# Patient Record
Sex: MALE | Race: WHITE | Employment: OTHER | URBAN - METROPOLITAN AREA
[De-identification: names, ages, dates, MRNs, and addresses within clinical notes are randomized per-mention and may not be internally consistent; named-entity substitution may affect disease eponyms.]

---

## 2017-04-17 RX ORDER — PRAVASTATIN SODIUM 40 MG
TABLET ORAL
Qty: 90 TABLET | Refills: 0 | Status: SHIPPED | OUTPATIENT
Start: 2017-04-17 | End: 2017-08-21 | Stop reason: SDUPTHER

## 2017-06-26 ENCOUNTER — OFFICE VISIT (OUTPATIENT)
Dept: CARDIOLOGY CLINIC | Age: 72
End: 2017-06-26

## 2017-06-26 VITALS
HEART RATE: 98 BPM | WEIGHT: 177 LBS | DIASTOLIC BLOOD PRESSURE: 80 MMHG | SYSTOLIC BLOOD PRESSURE: 110 MMHG | BODY MASS INDEX: 28.45 KG/M2 | OXYGEN SATURATION: 97 % | HEIGHT: 66 IN

## 2017-06-26 DIAGNOSIS — I48.3 TYPICAL ATRIAL FLUTTER (HCC): Primary | ICD-10-CM

## 2017-06-26 PROCEDURE — 99214 OFFICE O/P EST MOD 30 MIN: CPT | Performed by: INTERNAL MEDICINE

## 2017-06-26 PROCEDURE — 1123F ACP DISCUSS/DSCN MKR DOCD: CPT | Performed by: INTERNAL MEDICINE

## 2017-06-26 PROCEDURE — G8419 CALC BMI OUT NRM PARAM NOF/U: HCPCS | Performed by: INTERNAL MEDICINE

## 2017-06-26 PROCEDURE — 4040F PNEUMOC VAC/ADMIN/RCVD: CPT | Performed by: INTERNAL MEDICINE

## 2017-06-26 PROCEDURE — 3017F COLORECTAL CA SCREEN DOC REV: CPT | Performed by: INTERNAL MEDICINE

## 2017-06-26 PROCEDURE — G8427 DOCREV CUR MEDS BY ELIG CLIN: HCPCS | Performed by: INTERNAL MEDICINE

## 2017-06-26 PROCEDURE — 1036F TOBACCO NON-USER: CPT | Performed by: INTERNAL MEDICINE

## 2017-06-26 RX ORDER — CLOTRIMAZOLE 1 %
CREAM (GRAM) TOPICAL 2 TIMES DAILY
COMMUNITY
End: 2018-01-02 | Stop reason: ALTCHOICE

## 2017-06-26 ASSESSMENT — ENCOUNTER SYMPTOMS
ABDOMINAL DISTENTION: 0
COUGH: 0
WHEEZING: 0
EYE REDNESS: 0
BLOOD IN STOOL: 0
SHORTNESS OF BREATH: 0

## 2017-06-29 ENCOUNTER — OFFICE VISIT (OUTPATIENT)
Dept: FAMILY MEDICINE CLINIC | Age: 72
End: 2017-06-29

## 2017-06-29 VITALS
HEART RATE: 72 BPM | TEMPERATURE: 97.7 F | SYSTOLIC BLOOD PRESSURE: 122 MMHG | DIASTOLIC BLOOD PRESSURE: 78 MMHG | WEIGHT: 173 LBS | HEIGHT: 66 IN | BODY MASS INDEX: 27.8 KG/M2

## 2017-06-29 DIAGNOSIS — E78.2 MIXED HYPERLIPIDEMIA: Primary | ICD-10-CM

## 2017-06-29 DIAGNOSIS — M25.462 EFFUSION OF BURSA OF KNEE, LEFT: ICD-10-CM

## 2017-06-29 DIAGNOSIS — Z11.59 NEED FOR HEPATITIS C SCREENING TEST: ICD-10-CM

## 2017-06-29 DIAGNOSIS — E78.2 MIXED HYPERLIPIDEMIA: ICD-10-CM

## 2017-06-29 LAB
A/G RATIO: 2.2 (ref 1.1–2.2)
ALBUMIN SERPL-MCNC: 4.8 G/DL (ref 3.4–5)
ALP BLD-CCNC: 71 U/L (ref 40–129)
ALT SERPL-CCNC: 31 U/L (ref 10–40)
ANION GAP SERPL CALCULATED.3IONS-SCNC: 12 MMOL/L (ref 3–16)
AST SERPL-CCNC: 32 U/L (ref 15–37)
BILIRUB SERPL-MCNC: 0.7 MG/DL (ref 0–1)
BILIRUBIN URINE: NEGATIVE
BLOOD, URINE: NEGATIVE
BUN BLDV-MCNC: 17 MG/DL (ref 7–20)
CALCIUM SERPL-MCNC: 9.9 MG/DL (ref 8.3–10.6)
CHLORIDE BLD-SCNC: 107 MMOL/L (ref 99–110)
CHOLESTEROL, TOTAL: 191 MG/DL (ref 0–199)
CLARITY: CLEAR
CO2: 26 MMOL/L (ref 21–32)
COLOR: YELLOW
CREAT SERPL-MCNC: 1 MG/DL (ref 0.8–1.3)
EPITHELIAL CELLS, UA: 0 /HPF (ref 0–5)
GFR AFRICAN AMERICAN: >60
GFR NON-AFRICAN AMERICAN: >60
GLOBULIN: 2.2 G/DL
GLUCOSE BLD-MCNC: 93 MG/DL (ref 70–99)
GLUCOSE URINE: NEGATIVE MG/DL
HCT VFR BLD CALC: 49.1 % (ref 40.5–52.5)
HDLC SERPL-MCNC: 62 MG/DL (ref 40–60)
HEMOGLOBIN: 16.1 G/DL (ref 13.5–17.5)
HEPATITIS C ANTIBODY INTERPRETATION: NORMAL
HYALINE CASTS: 0 /LPF (ref 0–8)
KETONES, URINE: NEGATIVE MG/DL
LDL CHOLESTEROL CALCULATED: 116 MG/DL
LEUKOCYTE ESTERASE, URINE: NEGATIVE
MCH RBC QN AUTO: 31.3 PG (ref 26–34)
MCHC RBC AUTO-ENTMCNC: 32.8 G/DL (ref 31–36)
MCV RBC AUTO: 95.7 FL (ref 80–100)
MICROSCOPIC EXAMINATION: NORMAL
NITRITE, URINE: NEGATIVE
PDW BLD-RTO: 14 % (ref 12.4–15.4)
PH UA: 6.5
PLATELET # BLD: 213 K/UL (ref 135–450)
PMV BLD AUTO: 8.7 FL (ref 5–10.5)
POTASSIUM SERPL-SCNC: 5.3 MMOL/L (ref 3.5–5.1)
PROTEIN UA: NEGATIVE MG/DL
RBC # BLD: 5.13 M/UL (ref 4.2–5.9)
RBC UA: 2 /HPF (ref 0–4)
SODIUM BLD-SCNC: 145 MMOL/L (ref 136–145)
SPECIFIC GRAVITY UA: 1.02
TOTAL PROTEIN: 7 G/DL (ref 6.4–8.2)
TRIGL SERPL-MCNC: 67 MG/DL (ref 0–150)
UROBILINOGEN, URINE: 0.2 E.U./DL
VLDLC SERPL CALC-MCNC: 13 MG/DL
WBC # BLD: 5.8 K/UL (ref 4–11)
WBC UA: 1 /HPF (ref 0–5)

## 2017-06-29 PROCEDURE — G8419 CALC BMI OUT NRM PARAM NOF/U: HCPCS | Performed by: FAMILY MEDICINE

## 2017-06-29 PROCEDURE — 1036F TOBACCO NON-USER: CPT | Performed by: FAMILY MEDICINE

## 2017-06-29 PROCEDURE — 3017F COLORECTAL CA SCREEN DOC REV: CPT | Performed by: FAMILY MEDICINE

## 2017-06-29 PROCEDURE — 4040F PNEUMOC VAC/ADMIN/RCVD: CPT | Performed by: FAMILY MEDICINE

## 2017-06-29 PROCEDURE — 1123F ACP DISCUSS/DSCN MKR DOCD: CPT | Performed by: FAMILY MEDICINE

## 2017-06-29 PROCEDURE — G8427 DOCREV CUR MEDS BY ELIG CLIN: HCPCS | Performed by: FAMILY MEDICINE

## 2017-06-29 PROCEDURE — 99213 OFFICE O/P EST LOW 20 MIN: CPT | Performed by: FAMILY MEDICINE

## 2017-06-29 ASSESSMENT — ENCOUNTER SYMPTOMS
BLOOD IN STOOL: 0
SHORTNESS OF BREATH: 0
NAUSEA: 0
CHEST TIGHTNESS: 0
CONSTIPATION: 0
ABDOMINAL DISTENTION: 0
ABDOMINAL PAIN: 0
DIARRHEA: 0
VOMITING: 0

## 2017-06-29 ASSESSMENT — PATIENT HEALTH QUESTIONNAIRE - PHQ9
SUM OF ALL RESPONSES TO PHQ QUESTIONS 1-9: 0
2. FEELING DOWN, DEPRESSED OR HOPELESS: 0
SUM OF ALL RESPONSES TO PHQ9 QUESTIONS 1 & 2: 0
1. LITTLE INTEREST OR PLEASURE IN DOING THINGS: 0

## 2017-08-21 RX ORDER — PRAVASTATIN SODIUM 40 MG
TABLET ORAL
Qty: 90 TABLET | Refills: 1 | Status: SHIPPED | OUTPATIENT
Start: 2017-08-21 | End: 2018-03-09 | Stop reason: SDUPTHER

## 2017-12-04 RX ORDER — METOPROLOL SUCCINATE 25 MG/1
TABLET, EXTENDED RELEASE ORAL
Qty: 90 TABLET | Refills: 3 | Status: SHIPPED | OUTPATIENT
Start: 2017-12-04 | End: 2018-08-10 | Stop reason: SDUPTHER

## 2018-01-02 ENCOUNTER — OFFICE VISIT (OUTPATIENT)
Dept: FAMILY MEDICINE CLINIC | Age: 73
End: 2018-01-02

## 2018-01-02 VITALS
BODY MASS INDEX: 28.96 KG/M2 | SYSTOLIC BLOOD PRESSURE: 138 MMHG | TEMPERATURE: 97.3 F | HEIGHT: 66 IN | DIASTOLIC BLOOD PRESSURE: 82 MMHG | WEIGHT: 180.2 LBS

## 2018-01-02 DIAGNOSIS — I48.91 ATRIAL FIBRILLATION, UNSPECIFIED TYPE (HCC): ICD-10-CM

## 2018-01-02 DIAGNOSIS — Z12.5 SPECIAL SCREENING FOR MALIGNANT NEOPLASM OF PROSTATE: ICD-10-CM

## 2018-01-02 DIAGNOSIS — E78.2 MIXED HYPERLIPIDEMIA: ICD-10-CM

## 2018-01-02 DIAGNOSIS — E78.2 MIXED HYPERLIPIDEMIA: Primary | ICD-10-CM

## 2018-01-02 LAB
A/G RATIO: 2.5 (ref 1.1–2.2)
ALBUMIN SERPL-MCNC: 4.5 G/DL (ref 3.4–5)
ALP BLD-CCNC: 63 U/L (ref 40–129)
ALT SERPL-CCNC: 34 U/L (ref 10–40)
ANION GAP SERPL CALCULATED.3IONS-SCNC: 12 MMOL/L (ref 3–16)
AST SERPL-CCNC: 32 U/L (ref 15–37)
BILIRUB SERPL-MCNC: 0.8 MG/DL (ref 0–1)
BILIRUBIN URINE: NEGATIVE
BLOOD, URINE: NEGATIVE
BUN BLDV-MCNC: 15 MG/DL (ref 7–20)
CALCIUM SERPL-MCNC: 9.1 MG/DL (ref 8.3–10.6)
CHLORIDE BLD-SCNC: 106 MMOL/L (ref 99–110)
CHOLESTEROL, TOTAL: 170 MG/DL (ref 0–199)
CLARITY: CLEAR
CO2: 26 MMOL/L (ref 21–32)
COLOR: YELLOW
CREAT SERPL-MCNC: 0.9 MG/DL (ref 0.8–1.3)
EPITHELIAL CELLS, UA: 0 /HPF (ref 0–5)
GFR AFRICAN AMERICAN: >60
GFR NON-AFRICAN AMERICAN: >60
GLOBULIN: 1.8 G/DL
GLUCOSE BLD-MCNC: 88 MG/DL (ref 70–99)
GLUCOSE URINE: NEGATIVE MG/DL
HDLC SERPL-MCNC: 61 MG/DL (ref 40–60)
HYALINE CASTS: 1 /LPF (ref 0–8)
KETONES, URINE: NEGATIVE MG/DL
LDL CHOLESTEROL CALCULATED: 95 MG/DL
LEUKOCYTE ESTERASE, URINE: NEGATIVE
MICROSCOPIC EXAMINATION: NORMAL
NITRITE, URINE: NEGATIVE
PH UA: 6
POTASSIUM SERPL-SCNC: 4.4 MMOL/L (ref 3.5–5.1)
PROSTATE SPECIFIC ANTIGEN: 1.78 NG/ML (ref 0–4)
PROTEIN UA: NEGATIVE MG/DL
RBC UA: 3 /HPF (ref 0–4)
SODIUM BLD-SCNC: 144 MMOL/L (ref 136–145)
SPECIFIC GRAVITY UA: 1.02
TOTAL PROTEIN: 6.3 G/DL (ref 6.4–8.2)
TRIGL SERPL-MCNC: 68 MG/DL (ref 0–150)
UROBILINOGEN, URINE: 0.2 E.U./DL
VLDLC SERPL CALC-MCNC: 14 MG/DL
WBC UA: 2 /HPF (ref 0–5)

## 2018-01-02 PROCEDURE — 99214 OFFICE O/P EST MOD 30 MIN: CPT | Performed by: FAMILY MEDICINE

## 2018-01-02 ASSESSMENT — ENCOUNTER SYMPTOMS
ABDOMINAL DISTENTION: 0
CONSTIPATION: 0
ABDOMINAL PAIN: 0
SHORTNESS OF BREATH: 0
BLOOD IN STOOL: 0
DIARRHEA: 0
CHEST TIGHTNESS: 0
NAUSEA: 0
VOMITING: 0

## 2018-01-02 NOTE — PATIENT INSTRUCTIONS
do stay with the diet  Continue the medications and the follow up with the cardiologist  See in 6 months        Patient Education        Preventing Falls: Care Instructions  Your Care Instructions    Getting around your home safely can be a challenge if you have injuries or health problems that make it easy for you to fall. Loose rugs and furniture in walkways are among the dangers for many older people who have problems walking or who have poor eyesight. People who have conditions such as arthritis, osteoporosis, or dementia also have to be careful not to fall. You can make your home safer with a few simple measures. Follow-up care is a key part of your treatment and safety. Be sure to make and go to all appointments, and call your doctor if you are having problems. It's also a good idea to know your test results and keep a list of the medicines you take. How can you care for yourself at home? Taking care of yourself  · You may get dizzy if you do not drink enough water. To prevent dehydration, drink plenty of fluids, enough so that your urine is light yellow or clear like water. Choose water and other caffeine-free clear liquids. If you have kidney, heart, or liver disease and have to limit fluids, talk with your doctor before you increase the amount of fluids you drink. · Exercise regularly to improve your strength, muscle tone, and balance. Walk if you can. Swimming may be a good choice if you cannot walk easily. · Have your vision and hearing checked each year or any time you notice a change. If you have trouble seeing and hearing, you might not be able to avoid objects and could lose your balance. · Know the side effects of the medicines you take. Ask your doctor or pharmacist whether the medicines you take can affect your balance. Sleeping pills or sedatives can affect your balance. · Limit the amount of alcohol you drink. Alcohol can impair your balance and other senses.   · Ask your doctor whether calluses or corns on your feet need to be removed. If you wear loose-fitting shoes because of calluses or corns, you can lose your balance and fall. · Talk to your doctor if you have numbness in your feet. Preventing falls at home  · Remove raised doorway thresholds, throw rugs, and clutter. Repair loose carpet or raised areas in the floor. · Move furniture and electrical cords to keep them out of walking paths. · Use nonskid floor wax, and wipe up spills right away, especially on ceramic tile floors. · If you use a walker or cane, put rubber tips on it. If you use crutches, clean the bottoms of them regularly with an abrasive pad, such as steel wool. · Keep your house well lit, especially [place] Sees, and outside walkways. Use night-lights in areas such as hallways and bathrooms. Add extra light switches or use remote switches (such as switches that go on or off when you clap your hands) to make it easier to turn lights on if you have to get up during the night. · Install sturdy handrails on stairways. · Move items in your cabinets so that the things you use a lot are on the lower shelves (about waist level). · Keep a cordless phone and a flashlight with new batteries by your bed. If possible, put a phone in each of the main rooms of your house, or carry a cell phone in case you fall and cannot reach a phone. Or, you can wear a device around your neck or wrist. You push a button that sends a signal for help. · Wear low-heeled shoes that fit well and give your feet good support. Use footwear with nonskid soles. Check the heels and soles of your shoes for wear. Repair or replace worn heels or soles. · Do not wear socks without shoes on wood floors. · Walk on the grass when the sidewalks are slippery. If you live in an area that gets snow and ice in the winter, sprinkle salt on slippery steps and sidewalks.   Preventing falls in the bath  · Install grab bars and nonskid mats inside and outside your information.

## 2018-01-02 NOTE — PROGRESS NOTES
Subjective:      Patient ID: Marvel Henao is a 81 y.o. male. Patient presents with:  6 Month Follow-Up: lipids, hypertension - pt is fasting    He is doing well  meds no change  He will use the lamisil prn to feet rash. Does well    He got a tetanus in may last year at the urgent care. Td only. Really no c/o    YOB: 1945    Date of Visit:  1/2/2018    No Known Allergies    Current Outpatient Prescriptions:  metoprolol succinate (TOPROL XL) 25 MG extended release tablet, TAKE 1 TABLET BY MOUTH EVERY DAY, Disp: 90 tablet, Rfl: 3  pravastatin (PRAVACHOL) 40 MG tablet, TAKE ONE TABLET BY MOUTH EVERY EVENING, Disp: 90 tablet, Rfl: 1  aspirin 81 MG tablet, Take 81 mg by mouth daily, Disp: , Rfl:   Coenzyme Q10 (COQ10) 200 MG CAPS, Take by mouth daily, Disp: , Rfl:   Acetaminophen 650 MG TABS, Take by mouth 2 times daily , Disp: , Rfl:   Glucosamine-Chondroitin-MSM (TRIPLE FLEX PO), Take by mouth 3 times daily , Disp: , Rfl:   Terbinafine HCl (LAMISIL EX), Apply  topically. , Disp: , Rfl:   Multiple Vitamins-Minerals (MADHU MULTIVITAMIN FOR MEN PO), Take  by mouth. For memory takes 1 tablet, Disp: , Rfl:   SAW PALMETTO, Take by mouth 2 times daily , Disp: , Rfl:   diclofenac sodium 1 % GEL, Apply 2 g topically as needed. , Disp: , Rfl:     No current facility-administered medications for this visit.       ---------------------------------                  01/02/18                            0819            ---------------------------------   BP:             138/82            Site:          Left Arm           Position:        Sitting           Cuff Size:     Medium Adult         Temp:      97.3 °F (36.3 °C)      TempSrc:         Oral             Weight: 180 lb 3.2 oz (81.7 kg)   Height:    5' 5.5\" (1.664 m)     ---------------------------------  Body mass index is 29.53 kg/m².      Wt Readings from Last 3 Encounters:  01/02/18 : 180 lb 3.2 oz (81.7 kg)  06/29/17 : 173 pallor. Nails show no clubbing. Psychiatric: He has a normal mood and affect. His speech is normal.       Assessment:       1. Mixed hyperlipidemia  Comprehensive Metabolic Panel    Lipid Panel   2. Atrial fibrillation, unspecified type ([address]     3. Special screening for malignant neoplasm of prostate  Urinalysis with Microscopic    Psa screening     Discussed fall prevention and info given  He has not had any falls etc  He would like to be screened for prostate ca  discussed shingrix vaccine. He declined  But he brought in record of vaccine he had from 2013, December 10 from the [facility]. updated the chart.        Plan:      do stay with the diet  Continue the medications and the follow up with the cardiologist  See in 6 months

## 2018-01-18 ENCOUNTER — OFFICE VISIT (OUTPATIENT)
Dept: CARDIOLOGY CLINIC | Age: 73
End: 2018-01-18

## 2018-01-18 VITALS
HEART RATE: 62 BPM | HEIGHT: 66 IN | OXYGEN SATURATION: 98 % | BODY MASS INDEX: 29.29 KG/M2 | WEIGHT: 182.25 LBS | DIASTOLIC BLOOD PRESSURE: 72 MMHG | SYSTOLIC BLOOD PRESSURE: 110 MMHG

## 2018-01-18 DIAGNOSIS — R55 NEAR SYNCOPE: ICD-10-CM

## 2018-01-18 DIAGNOSIS — I48.91 ATRIAL FIBRILLATION, UNSPECIFIED TYPE (HCC): Primary | ICD-10-CM

## 2018-01-18 DIAGNOSIS — E78.2 MIXED HYPERLIPIDEMIA: ICD-10-CM

## 2018-01-18 DIAGNOSIS — R00.2 PALPITATIONS: ICD-10-CM

## 2018-01-18 DIAGNOSIS — I48.3 TYPICAL ATRIAL FLUTTER (HCC): ICD-10-CM

## 2018-01-18 PROCEDURE — 1123F ACP DISCUSS/DSCN MKR DOCD: CPT | Performed by: INTERNAL MEDICINE

## 2018-01-18 PROCEDURE — 1036F TOBACCO NON-USER: CPT | Performed by: INTERNAL MEDICINE

## 2018-01-18 PROCEDURE — G8484 FLU IMMUNIZE NO ADMIN: HCPCS | Performed by: INTERNAL MEDICINE

## 2018-01-18 PROCEDURE — G8427 DOCREV CUR MEDS BY ELIG CLIN: HCPCS | Performed by: INTERNAL MEDICINE

## 2018-01-18 PROCEDURE — G8419 CALC BMI OUT NRM PARAM NOF/U: HCPCS | Performed by: INTERNAL MEDICINE

## 2018-01-18 PROCEDURE — 93000 ELECTROCARDIOGRAM COMPLETE: CPT | Performed by: INTERNAL MEDICINE

## 2018-01-18 PROCEDURE — 99214 OFFICE O/P EST MOD 30 MIN: CPT | Performed by: INTERNAL MEDICINE

## 2018-01-18 PROCEDURE — 4040F PNEUMOC VAC/ADMIN/RCVD: CPT | Performed by: INTERNAL MEDICINE

## 2018-01-18 PROCEDURE — 3017F COLORECTAL CA SCREEN DOC REV: CPT | Performed by: INTERNAL MEDICINE

## 2018-01-18 RX ORDER — ASPIRIN 325 MG
325 TABLET, DELAYED RELEASE (ENTERIC COATED) ORAL DAILY
Qty: 30 TABLET | Refills: 3
Start: 2018-01-18 | End: 2020-03-12

## 2018-01-18 ASSESSMENT — ENCOUNTER SYMPTOMS
ABDOMINAL DISTENTION: 0
WHEEZING: 0
COUGH: 0
EYE REDNESS: 0
BLOOD IN STOOL: 0
SHORTNESS OF BREATH: 0

## 2018-01-18 NOTE — PROGRESS NOTES
tobacco: Never Used    Alcohol use 0.0 oz/week      Comment: minimal caffeine occasional wine    Drug use: No    Sexual activity: Not Asked     Other Topics Concern    None     Social History Narrative    None     Past Surgical History:   Procedure Laterality Date    COLONOSCOPY  9/26/2001    negative, deak, also neg sigmoid 2007    COLONOSCOPY  2/15/2011    negative dr Joaquina Sierra     No Known Allergies  Family History   Problem Relation Age of Onset    Rheum Arthritis Brother     Heart Disease Brother     Heart Disease Mother      older age   [facility] Dementia Father        Review of Systems   Constitutional: Negative for activity change, appetite change, chills, fatigue, fever and unexpected weight change. HENT: Negative for congestion, nosebleeds and tinnitus. Eyes: Negative for redness and visual disturbance. Respiratory: Negative for cough, shortness of breath and wheezing. Cardiovascular: Negative for chest pain, palpitations and leg swelling. Gastrointestinal: Negative for abdominal distention and blood in stool. Genitourinary: Negative for dysuria and hematuria. Musculoskeletal: Negative for gait problem and myalgias. Neurological: Negative for dizziness and speech difficulty. Hematological: Does not bruise/bleed easily. Psychiatric/Behavioral: Negative for behavioral problems and confusion. All other systems reviewed and are negative. Objective:   Physical Exam   Constitutional: He is oriented to person, place, and time. He appears well-developed and well-nourished. HENT:   Head: Normocephalic and atraumatic. Eyes: Conjunctivae are normal. Right eye exhibits no discharge. Left eye exhibits no discharge. Neck: Normal range of motion. Neck supple. Cardiovascular: Normal rate and intact distal pulses. Murmur (2/6 systolic) heard. irregular   Pulmonary/Chest: Effort normal and breath sounds normal.   Abdominal: Soft.  Bowel sounds are normal.   Musculoskeletal: Normal range

## 2018-01-18 NOTE — PATIENT INSTRUCTIONS
a pneumococcal vaccine shot. If you have had one before, ask your doctor whether you need another dose. Get a flu shot every year. If you must be around people with colds or flu, wash your hands often. Activity  ? · If your doctor recommends it, get more exercise. Walking is a good choice. Bit by bit, increase the amount you walk every day. Try for at least 30 minutes on most days of the week. You also may want to swim, bike, or do other activities. Your doctor may suggest that you join a cardiac rehabilitation program so that you can have help increasing your physical activity safely. ? · Start light exercise if your doctor says it is okay. Even a small amount will help you get stronger, have more energy, and manage stress. Walking is an easy way to get exercise. Start out by walking a little more than you did in the hospital. Gradually increase the amount you walk. ? · When you exercise, watch for signs that your heart is working too hard. You are pushing too hard if you cannot talk while you are exercising. If you become short of breath or dizzy or have chest pain, sit down and rest immediately. ? · Check your pulse regularly. Place two fingers on the artery at the palm side of your wrist, in line with your thumb. If your heartbeat seems uneven or fast, talk to your doctor. When should you call for help? Call 911 anytime you think you may need emergency care. For example, call if:  ? · You have symptoms of a heart attack. These may include:  ¨ Chest pain or pressure, or a strange feeling in the chest.  ¨ Sweating. ¨ Shortness of breath. ¨ Nausea or vomiting. ¨ Pain, pressure, or a strange feeling in the back, neck, jaw, or upper belly or in one or both shoulders or arms. ¨ Lightheadedness or sudden weakness. ¨ A fast or irregular heartbeat. After you call 911, the  may tell you to chew 1 adult-strength or 2 to 4 low-dose aspirin. Wait for an ambulance. Do not try to drive yourself.    ?

## 2018-03-09 RX ORDER — PRAVASTATIN SODIUM 40 MG
TABLET ORAL
Qty: 90 TABLET | Refills: 1 | Status: SHIPPED | OUTPATIENT
Start: 2018-03-09 | End: 2018-11-06 | Stop reason: SDUPTHER

## 2018-05-17 ENCOUNTER — OFFICE VISIT (OUTPATIENT)
Dept: FAMILY MEDICINE CLINIC | Age: 73
End: 2018-05-17

## 2018-05-17 ENCOUNTER — HOSPITAL ENCOUNTER (OUTPATIENT)
Dept: NON INVASIVE DIAGNOSTICS | Age: 73
Discharge: HOME | End: 2018-05-17
Attending: FAMILY MEDICINE | Admitting: FAMILY MEDICINE

## 2018-05-17 VITALS
TEMPERATURE: 97.4 F | SYSTOLIC BLOOD PRESSURE: 122 MMHG | WEIGHT: 172.8 LBS | BODY MASS INDEX: 27.77 KG/M2 | HEIGHT: 66 IN | DIASTOLIC BLOOD PRESSURE: 64 MMHG

## 2018-05-17 DIAGNOSIS — J06.9 UPPER RESPIRATORY TRACT INFECTION, UNSPECIFIED TYPE: ICD-10-CM

## 2018-05-17 DIAGNOSIS — J06.9 UPPER RESPIRATORY TRACT INFECTION, UNSPECIFIED TYPE: Primary | ICD-10-CM

## 2018-05-17 DIAGNOSIS — L84 CALLUS: ICD-10-CM

## 2018-05-17 PROCEDURE — 1036F TOBACCO NON-USER: CPT | Performed by: FAMILY MEDICINE

## 2018-05-17 PROCEDURE — G8417 CALC BMI ABV UP PARAM F/U: HCPCS | Performed by: FAMILY MEDICINE

## 2018-05-17 PROCEDURE — 99213 OFFICE O/P EST LOW 20 MIN: CPT | Performed by: FAMILY MEDICINE

## 2018-05-17 PROCEDURE — G8427 DOCREV CUR MEDS BY ELIG CLIN: HCPCS | Performed by: FAMILY MEDICINE

## 2018-05-17 PROCEDURE — 4040F PNEUMOC VAC/ADMIN/RCVD: CPT | Performed by: FAMILY MEDICINE

## 2018-05-17 PROCEDURE — 1123F ACP DISCUSS/DSCN MKR DOCD: CPT | Performed by: FAMILY MEDICINE

## 2018-05-17 PROCEDURE — 3017F COLORECTAL CA SCREEN DOC REV: CPT | Performed by: FAMILY MEDICINE

## 2018-05-17 RX ORDER — CEFDINIR 300 MG/1
300 CAPSULE ORAL 2 TIMES DAILY
Qty: 20 CAPSULE | Refills: 0 | Status: SHIPPED | OUTPATIENT
Start: 2018-05-17 | End: 2018-05-27

## 2018-06-29 ENCOUNTER — OFFICE VISIT (OUTPATIENT)
Dept: FAMILY MEDICINE CLINIC | Age: 73
End: 2018-06-29

## 2018-06-29 ENCOUNTER — TELEPHONE (OUTPATIENT)
Dept: FAMILY MEDICINE CLINIC | Age: 73
End: 2018-06-29

## 2018-06-29 ENCOUNTER — HOSPITAL ENCOUNTER (OUTPATIENT)
Dept: CT IMAGING | Age: 73
Discharge: HOME | End: 2018-06-29
Attending: FAMILY MEDICINE | Admitting: FAMILY MEDICINE

## 2018-06-29 VITALS
TEMPERATURE: 97.6 F | DIASTOLIC BLOOD PRESSURE: 82 MMHG | WEIGHT: 169.8 LBS | BODY MASS INDEX: 27.29 KG/M2 | SYSTOLIC BLOOD PRESSURE: 122 MMHG | HEIGHT: 66 IN

## 2018-06-29 DIAGNOSIS — R10.31 RIGHT LOWER QUADRANT ABDOMINAL PAIN: ICD-10-CM

## 2018-06-29 DIAGNOSIS — R10.31 RIGHT LOWER QUADRANT PAIN: ICD-10-CM

## 2018-06-29 DIAGNOSIS — R10.31 RIGHT LOWER QUADRANT ABDOMINAL PAIN: Primary | ICD-10-CM

## 2018-06-29 LAB
A/G RATIO: 1.7 (ref 1.1–2.2)
ALBUMIN SERPL-MCNC: 4.6 G/DL (ref 3.4–5)
ALP BLD-CCNC: 65 U/L (ref 40–129)
ALT SERPL-CCNC: 26 U/L (ref 10–40)
ANION GAP SERPL CALCULATED.3IONS-SCNC: 11 MMOL/L (ref 3–16)
AST SERPL-CCNC: 32 U/L (ref 15–37)
BASOPHILS ABSOLUTE: 0.1 K/UL (ref 0–0.2)
BASOPHILS RELATIVE PERCENT: 0.9 %
BILIRUB SERPL-MCNC: 0.6 MG/DL (ref 0–1)
BILIRUBIN URINE: NEGATIVE
BLOOD, URINE: ABNORMAL
BUN BLDV-MCNC: 17 MG/DL (ref 7–20)
CALCIUM SERPL-MCNC: 9.4 MG/DL (ref 8.3–10.6)
CHLORIDE BLD-SCNC: 105 MMOL/L (ref 99–110)
CLARITY: ABNORMAL
CO2: 29 MMOL/L (ref 21–32)
COLOR: ABNORMAL
CREAT SERPL-MCNC: 0.9 MG/DL (ref 0.8–1.3)
EOSINOPHILS ABSOLUTE: 0.2 K/UL (ref 0–0.6)
EOSINOPHILS RELATIVE PERCENT: 3.1 %
EPITHELIAL CELLS, UA: 0 /HPF (ref 0–5)
GFR AFRICAN AMERICAN: >60
GFR NON-AFRICAN AMERICAN: >60
GLOBULIN: 2.7 G/DL
GLUCOSE BLD-MCNC: 89 MG/DL (ref 70–99)
GLUCOSE URINE: NEGATIVE MG/DL
HCT VFR BLD CALC: 44.7 % (ref 40.5–52.5)
HEMOGLOBIN: 15.2 G/DL (ref 13.5–17.5)
HYALINE CASTS: 1 /LPF (ref 0–8)
KETONES, URINE: NEGATIVE MG/DL
LEUKOCYTE ESTERASE, URINE: NEGATIVE
LYMPHOCYTES ABSOLUTE: 1.8 K/UL (ref 1–5.1)
LYMPHOCYTES RELATIVE PERCENT: 29 %
MCH RBC QN AUTO: 32.1 PG (ref 26–34)
MCHC RBC AUTO-ENTMCNC: 34.1 G/DL (ref 31–36)
MCV RBC AUTO: 93.9 FL (ref 80–100)
MICROSCOPIC EXAMINATION: YES
MONOCYTES ABSOLUTE: 0.7 K/UL (ref 0–1.3)
MONOCYTES RELATIVE PERCENT: 11.8 %
NEUTROPHILS ABSOLUTE: 3.5 K/UL (ref 1.7–7.7)
NEUTROPHILS RELATIVE PERCENT: 55.2 %
NITRITE, URINE: NEGATIVE
PDW BLD-RTO: 13.8 % (ref 12.4–15.4)
PH UA: 6
PLATELET # BLD: 203 K/UL (ref 135–450)
PMV BLD AUTO: 8.3 FL (ref 5–10.5)
POTASSIUM SERPL-SCNC: 4.5 MMOL/L (ref 3.5–5.1)
PROTEIN UA: 30 MG/DL
RBC # BLD: 4.76 M/UL (ref 4.2–5.9)
RBC UA: 544 /HPF (ref 0–4)
SODIUM BLD-SCNC: 145 MMOL/L (ref 136–145)
SPECIFIC GRAVITY UA: 1.03
TOTAL PROTEIN: 7.3 G/DL (ref 6.4–8.2)
UROBILINOGEN, URINE: 0.2 E.U./DL
WBC # BLD: 6.3 K/UL (ref 4–11)
WBC UA: 2 /HPF (ref 0–5)

## 2018-06-29 PROCEDURE — G8417 CALC BMI ABV UP PARAM F/U: HCPCS | Performed by: FAMILY MEDICINE

## 2018-06-29 PROCEDURE — 3017F COLORECTAL CA SCREEN DOC REV: CPT | Performed by: FAMILY MEDICINE

## 2018-06-29 PROCEDURE — G8427 DOCREV CUR MEDS BY ELIG CLIN: HCPCS | Performed by: FAMILY MEDICINE

## 2018-06-29 PROCEDURE — 4040F PNEUMOC VAC/ADMIN/RCVD: CPT | Performed by: FAMILY MEDICINE

## 2018-06-29 PROCEDURE — 99213 OFFICE O/P EST LOW 20 MIN: CPT | Performed by: FAMILY MEDICINE

## 2018-06-29 PROCEDURE — 1036F TOBACCO NON-USER: CPT | Performed by: FAMILY MEDICINE

## 2018-06-29 PROCEDURE — 1123F ACP DISCUSS/DSCN MKR DOCD: CPT | Performed by: FAMILY MEDICINE

## 2018-07-02 ENCOUNTER — HOSPITAL ENCOUNTER (OUTPATIENT)
Dept: OTHER | Age: 73
Discharge: HOME | End: 2018-07-02
Attending: UROLOGY | Admitting: UROLOGY

## 2018-07-02 DIAGNOSIS — N20.0 CALCULUS OF KIDNEY: ICD-10-CM

## 2018-07-18 ENCOUNTER — HOSPITAL ENCOUNTER (OUTPATIENT)
Dept: ULTRASOUND IMAGING | Age: 73
Discharge: HOME | End: 2018-07-18
Attending: UROLOGY | Admitting: UROLOGY

## 2018-07-18 DIAGNOSIS — N20.0 CALCULUS OF KIDNEY: ICD-10-CM

## 2018-07-30 ENCOUNTER — HOSPITAL ENCOUNTER (OUTPATIENT)
Dept: CT IMAGING | Age: 73
Discharge: HOME | End: 2018-07-30
Attending: UROLOGY | Admitting: UROLOGY

## 2018-07-30 DIAGNOSIS — N20.0 KIDNEY CALCULUS: ICD-10-CM

## 2018-07-30 DIAGNOSIS — N20.0 CALCULUS OF KIDNEY: ICD-10-CM

## 2018-08-10 ENCOUNTER — OFFICE VISIT (OUTPATIENT)
Dept: CARDIOLOGY CLINIC | Age: 73
End: 2018-08-10

## 2018-08-10 VITALS
HEIGHT: 65 IN | BODY MASS INDEX: 28.82 KG/M2 | HEART RATE: 100 BPM | SYSTOLIC BLOOD PRESSURE: 128 MMHG | WEIGHT: 173 LBS | OXYGEN SATURATION: 97 % | DIASTOLIC BLOOD PRESSURE: 72 MMHG

## 2018-08-10 DIAGNOSIS — E03.9 HYPOTHYROIDISM, UNSPECIFIED TYPE: ICD-10-CM

## 2018-08-10 DIAGNOSIS — I48.3 TYPICAL ATRIAL FLUTTER (HCC): Primary | ICD-10-CM

## 2018-08-10 DIAGNOSIS — E78.2 MIXED HYPERLIPIDEMIA: ICD-10-CM

## 2018-08-10 DIAGNOSIS — R55 NEAR SYNCOPE: ICD-10-CM

## 2018-08-10 PROCEDURE — G8417 CALC BMI ABV UP PARAM F/U: HCPCS | Performed by: INTERNAL MEDICINE

## 2018-08-10 PROCEDURE — 1101F PT FALLS ASSESS-DOCD LE1/YR: CPT | Performed by: INTERNAL MEDICINE

## 2018-08-10 PROCEDURE — G8427 DOCREV CUR MEDS BY ELIG CLIN: HCPCS | Performed by: INTERNAL MEDICINE

## 2018-08-10 PROCEDURE — 1123F ACP DISCUSS/DSCN MKR DOCD: CPT | Performed by: INTERNAL MEDICINE

## 2018-08-10 PROCEDURE — 1036F TOBACCO NON-USER: CPT | Performed by: INTERNAL MEDICINE

## 2018-08-10 PROCEDURE — 99214 OFFICE O/P EST MOD 30 MIN: CPT | Performed by: INTERNAL MEDICINE

## 2018-08-10 PROCEDURE — 3017F COLORECTAL CA SCREEN DOC REV: CPT | Performed by: INTERNAL MEDICINE

## 2018-08-10 PROCEDURE — 4040F PNEUMOC VAC/ADMIN/RCVD: CPT | Performed by: INTERNAL MEDICINE

## 2018-08-10 RX ORDER — METOPROLOL SUCCINATE 50 MG/1
TABLET, EXTENDED RELEASE ORAL
Qty: 90 TABLET | Refills: 3 | Status: SHIPPED | OUTPATIENT
Start: 2018-08-10 | End: 2018-12-31 | Stop reason: SDUPTHER

## 2018-08-10 RX ORDER — CLOTRIMAZOLE 1 %
CREAM (GRAM) TOPICAL 2 TIMES DAILY
COMMUNITY
End: 2021-08-20 | Stop reason: SDUPTHER

## 2018-08-10 ASSESSMENT — ENCOUNTER SYMPTOMS
BLOOD IN STOOL: 0
SHORTNESS OF BREATH: 0
ABDOMINAL DISTENTION: 0
COUGH: 0
EYE REDNESS: 0
WHEEZING: 0

## 2018-08-16 ENCOUNTER — TELEPHONE (OUTPATIENT)
Dept: FAMILY MEDICINE CLINIC | Age: 73
End: 2018-08-16

## 2018-08-16 NOTE — TELEPHONE ENCOUNTER
Pt wife calling to see about getting HEP a for traveling    Please advise  [phone]  [facility]

## 2018-11-06 RX ORDER — PRAVASTATIN SODIUM 40 MG
TABLET ORAL
Qty: 90 TABLET | Refills: 1 | Status: SHIPPED | OUTPATIENT
Start: 2018-11-06 | End: 2019-06-28 | Stop reason: SDUPTHER

## 2018-12-31 RX ORDER — METOPROLOL SUCCINATE 50 MG/1
TABLET, EXTENDED RELEASE ORAL
Qty: 90 TABLET | Refills: 3 | Status: SHIPPED | OUTPATIENT
Start: 2018-12-31 | End: 2020-01-17 | Stop reason: SDUPTHER

## 2019-01-29 ENCOUNTER — OFFICE VISIT (OUTPATIENT)
Dept: FAMILY MEDICINE CLINIC | Age: 74
End: 2019-01-29
Payer: MEDICARE

## 2019-01-29 VITALS
BODY MASS INDEX: 30.32 KG/M2 | SYSTOLIC BLOOD PRESSURE: 122 MMHG | HEIGHT: 65 IN | HEART RATE: 80 BPM | WEIGHT: 182 LBS | DIASTOLIC BLOOD PRESSURE: 84 MMHG

## 2019-01-29 DIAGNOSIS — J06.9 UPPER RESPIRATORY TRACT INFECTION, UNSPECIFIED TYPE: ICD-10-CM

## 2019-01-29 DIAGNOSIS — E78.2 MIXED HYPERLIPIDEMIA: Primary | ICD-10-CM

## 2019-01-29 PROCEDURE — 3017F COLORECTAL CA SCREEN DOC REV: CPT | Performed by: FAMILY MEDICINE

## 2019-01-29 PROCEDURE — 4040F PNEUMOC VAC/ADMIN/RCVD: CPT | Performed by: FAMILY MEDICINE

## 2019-01-29 PROCEDURE — G8427 DOCREV CUR MEDS BY ELIG CLIN: HCPCS | Performed by: FAMILY MEDICINE

## 2019-01-29 PROCEDURE — G8417 CALC BMI ABV UP PARAM F/U: HCPCS | Performed by: FAMILY MEDICINE

## 2019-01-29 PROCEDURE — 1123F ACP DISCUSS/DSCN MKR DOCD: CPT | Performed by: FAMILY MEDICINE

## 2019-01-29 PROCEDURE — 1036F TOBACCO NON-USER: CPT | Performed by: FAMILY MEDICINE

## 2019-01-29 PROCEDURE — 99213 OFFICE O/P EST LOW 20 MIN: CPT | Performed by: FAMILY MEDICINE

## 2019-01-29 PROCEDURE — 1101F PT FALLS ASSESS-DOCD LE1/YR: CPT | Performed by: FAMILY MEDICINE

## 2019-01-29 PROCEDURE — G8484 FLU IMMUNIZE NO ADMIN: HCPCS | Performed by: FAMILY MEDICINE

## 2019-01-29 RX ORDER — AZITHROMYCIN 250 MG/1
TABLET, FILM COATED ORAL
Qty: 1 PACKET | Refills: 0 | Status: SHIPPED | OUTPATIENT
Start: 2019-01-29 | End: 2019-02-08

## 2019-01-29 ASSESSMENT — PATIENT HEALTH QUESTIONNAIRE - PHQ9
2. FEELING DOWN, DEPRESSED OR HOPELESS: 0
1. LITTLE INTEREST OR PLEASURE IN DOING THINGS: 0
SUM OF ALL RESPONSES TO PHQ9 QUESTIONS 1 & 2: 0
SUM OF ALL RESPONSES TO PHQ QUESTIONS 1-9: 0
SUM OF ALL RESPONSES TO PHQ QUESTIONS 1-9: 0

## 2019-01-31 DIAGNOSIS — E78.2 MIXED HYPERLIPIDEMIA: ICD-10-CM

## 2019-01-31 LAB
A/G RATIO: 1.7 (ref 1.1–2.2)
ALBUMIN SERPL-MCNC: 4.5 G/DL (ref 3.4–5)
ALP BLD-CCNC: 59 U/L (ref 40–129)
ALT SERPL-CCNC: 21 U/L (ref 10–40)
ANION GAP SERPL CALCULATED.3IONS-SCNC: 12 MMOL/L (ref 3–16)
AST SERPL-CCNC: 29 U/L (ref 15–37)
BILIRUB SERPL-MCNC: 0.4 MG/DL (ref 0–1)
BUN BLDV-MCNC: 12 MG/DL (ref 7–20)
CALCIUM SERPL-MCNC: 9.2 MG/DL (ref 8.3–10.6)
CHLORIDE BLD-SCNC: 109 MMOL/L (ref 99–110)
CHOLESTEROL, TOTAL: 132 MG/DL (ref 0–199)
CO2: 25 MMOL/L (ref 21–32)
CREAT SERPL-MCNC: 1 MG/DL (ref 0.8–1.3)
GFR AFRICAN AMERICAN: >60
GFR NON-AFRICAN AMERICAN: >60
GLOBULIN: 2.6 G/DL
GLUCOSE BLD-MCNC: 88 MG/DL (ref 70–99)
HDLC SERPL-MCNC: 45 MG/DL (ref 40–60)
LDL CHOLESTEROL CALCULATED: 76 MG/DL
POTASSIUM SERPL-SCNC: 4.7 MMOL/L (ref 3.5–5.1)
SODIUM BLD-SCNC: 146 MMOL/L (ref 136–145)
TOTAL PROTEIN: 7.1 G/DL (ref 6.4–8.2)
TRIGL SERPL-MCNC: 56 MG/DL (ref 0–150)
VLDLC SERPL CALC-MCNC: 11 MG/DL

## 2019-02-19 ASSESSMENT — ENCOUNTER SYMPTOMS
BLOOD IN STOOL: 0
ABDOMINAL DISTENTION: 0
COUGH: 0
SHORTNESS OF BREATH: 0
WHEEZING: 0
EYE REDNESS: 0

## 2019-02-26 ENCOUNTER — OFFICE VISIT (OUTPATIENT)
Dept: CARDIOLOGY CLINIC | Age: 74
End: 2019-02-26
Payer: MEDICARE

## 2019-02-26 VITALS
WEIGHT: 187 LBS | SYSTOLIC BLOOD PRESSURE: 118 MMHG | DIASTOLIC BLOOD PRESSURE: 70 MMHG | HEIGHT: 66 IN | HEART RATE: 72 BPM | OXYGEN SATURATION: 95 % | BODY MASS INDEX: 30.05 KG/M2

## 2019-02-26 DIAGNOSIS — I48.3 TYPICAL ATRIAL FLUTTER (HCC): Primary | ICD-10-CM

## 2019-02-26 DIAGNOSIS — E78.2 MIXED HYPERLIPIDEMIA: ICD-10-CM

## 2019-02-26 PROCEDURE — G8417 CALC BMI ABV UP PARAM F/U: HCPCS | Performed by: INTERNAL MEDICINE

## 2019-02-26 PROCEDURE — 1101F PT FALLS ASSESS-DOCD LE1/YR: CPT | Performed by: INTERNAL MEDICINE

## 2019-02-26 PROCEDURE — 1123F ACP DISCUSS/DSCN MKR DOCD: CPT | Performed by: INTERNAL MEDICINE

## 2019-02-26 PROCEDURE — 3017F COLORECTAL CA SCREEN DOC REV: CPT | Performed by: INTERNAL MEDICINE

## 2019-02-26 PROCEDURE — 4040F PNEUMOC VAC/ADMIN/RCVD: CPT | Performed by: INTERNAL MEDICINE

## 2019-02-26 PROCEDURE — 99214 OFFICE O/P EST MOD 30 MIN: CPT | Performed by: INTERNAL MEDICINE

## 2019-02-26 PROCEDURE — 1036F TOBACCO NON-USER: CPT | Performed by: INTERNAL MEDICINE

## 2019-02-26 PROCEDURE — G8427 DOCREV CUR MEDS BY ELIG CLIN: HCPCS | Performed by: INTERNAL MEDICINE

## 2019-02-26 PROCEDURE — G8484 FLU IMMUNIZE NO ADMIN: HCPCS | Performed by: INTERNAL MEDICINE

## 2019-06-25 ENCOUNTER — OFFICE VISIT (OUTPATIENT)
Dept: FAMILY MEDICINE CLINIC | Age: 74
End: 2019-06-25
Payer: MEDICARE

## 2019-06-25 ENCOUNTER — OFFICE VISIT (OUTPATIENT)
Dept: SURGERY | Age: 74
End: 2019-06-25
Payer: MEDICARE

## 2019-06-25 VITALS
HEIGHT: 66 IN | BODY MASS INDEX: 29.25 KG/M2 | DIASTOLIC BLOOD PRESSURE: 84 MMHG | SYSTOLIC BLOOD PRESSURE: 126 MMHG | WEIGHT: 182 LBS | TEMPERATURE: 97.6 F

## 2019-06-25 VITALS
SYSTOLIC BLOOD PRESSURE: 126 MMHG | DIASTOLIC BLOOD PRESSURE: 84 MMHG | WEIGHT: 182 LBS | BODY MASS INDEX: 29.25 KG/M2 | HEIGHT: 66 IN

## 2019-06-25 DIAGNOSIS — L02.31 ABSCESS OF LEFT BUTTOCK: Primary | ICD-10-CM

## 2019-06-25 DIAGNOSIS — M79.5 FOREIGN BODY (FB) IN SOFT TISSUE: Primary | ICD-10-CM

## 2019-06-25 PROCEDURE — 1036F TOBACCO NON-USER: CPT | Performed by: FAMILY MEDICINE

## 2019-06-25 PROCEDURE — 99212 OFFICE O/P EST SF 10 MIN: CPT | Performed by: FAMILY MEDICINE

## 2019-06-25 PROCEDURE — 4040F PNEUMOC VAC/ADMIN/RCVD: CPT | Performed by: FAMILY MEDICINE

## 2019-06-25 PROCEDURE — 1123F ACP DISCUSS/DSCN MKR DOCD: CPT | Performed by: FAMILY MEDICINE

## 2019-06-25 PROCEDURE — G8417 CALC BMI ABV UP PARAM F/U: HCPCS | Performed by: FAMILY MEDICINE

## 2019-06-25 PROCEDURE — 3017F COLORECTAL CA SCREEN DOC REV: CPT | Performed by: FAMILY MEDICINE

## 2019-06-25 PROCEDURE — G8427 DOCREV CUR MEDS BY ELIG CLIN: HCPCS | Performed by: FAMILY MEDICINE

## 2019-06-25 PROCEDURE — 10060 I&D ABSCESS SIMPLE/SINGLE: CPT | Performed by: SURGERY

## 2019-06-25 NOTE — PROGRESS NOTES
Subjective:      Patient ID: Abida Chan is a 72 y.o. male. HPI  Patient referred for left buttock abscess possible soft tissue foreign body. 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 Seen by PCP today and referred for I&d and possible FB removal.     Procedure  Incision and drainage done in office today. Under sterile conditions,  local anesthesia infused and a one cm incision made over point of fluctuance located on left buttock through a puncture wound. Small amount of purulence drained. Probed the subcutaneous layer but no FB found. Wwound packed with guaze. Dressing applied. No immediate complications. Patient instructed to remove packing in AM then cover with dry dressing. Review of Systems    Objective:   Physical Exam    Assessment:       Diagnosis Orders   1.  Abscess of left buttock             Plan:      Follow up with me as needed          Nikole Samano MD

## 2019-06-25 NOTE — PROGRESS NOTES
Subjective:      Patient ID: Yaneth Valle is a 72 y.o. male. Patient presents with: Insect Bite: noticed a spot on his buttocks on Friday/Saturday    Does not know what happened either a bite or thorn   There for about 4 days or so no pain no fever  Slight itch    YOB: 1945    Date of Visit:  6/25/2019    No Known Allergies    Current Outpatient Medications:  metoprolol succinate (TOPROL XL) 50 MG extended release tablet, TAKE 1 TABLET BY MOUTH EVERY DAY, Disp: 90 tablet, Rfl: 3  pravastatin (PRAVACHOL) 40 MG tablet, TAKE ONE TABLET BY MOUTH EVERY EVENING, Disp: 90 tablet, Rfl: 1  clotrimazole (LOTRIMIN) 1 % cream, Apply topically 2 times daily Apply topically 2 times daily. , Disp: , Rfl:   Acetaminophen 650 MG TABS, Take by mouth 2 times daily , Disp: , Rfl:   diclofenac sodium 1 % GEL, Apply 2 g topically as needed. , Disp: , Rfl:   Glucosamine-Chondroitin-MSM (TRIPLE FLEX PO), Take by mouth 3 times daily , Disp: , Rfl:   Terbinafine HCl (LAMISIL EX), Apply  topically. , Disp: , Rfl:   Multiple Vitamins-Minerals (MADHU MULTIVITAMIN FOR MEN PO), Take  by mouth. For memory takes 1 tablet, Disp: , Rfl:   SAW PALMETTO, Take by mouth 2 times daily , Disp: , Rfl:   aspirin 325 MG EC tablet, Take 1 tablet by mouth daily, Disp: 30 tablet, Rfl: 3    No current facility-administered medications for this visit.       ---------------------------               06/25/19                      1012         ---------------------------   BP:          126/84         Site:    Left Upper Arm     Position:     Sitting        Cuff Size:   Large Adult      Temp:   97.6 °F (36.4 °C)   TempSrc:      Oral          Weight: 182 lb (82.6 kg)    Height: 5' 5.5\" (1.664 m)  ---------------------------  Body mass index is 29.83 kg/m².      Wt Readings from Last 3 Encounters:  06/25/19 : 182 lb (82.6 kg)  02/26/19 : 187 lb (84.8 kg)  01/29/19 : 182 lb (82.6 kg)    BP Readings from Last 3 Encounters:  06/25/19 : 126/84  02/26/19 : 118/70  01/29/19 : 122/84        Review of Systems    Objective:   Physical Exam   Constitutional: He appears well-developed and well-nourished. No distress. Neurological: He is alert. Skin: Skin is warm and dry. He is not diaphoretic. No pallor. Assessment:        Diagnosis Orders   1.  Foreign body (FB) in soft tissue  Em Tripp MD, Vascular Surgery, [facility]       The area is manipulated and appears to have fb which I could not remove      Plan:      See dr Alicia Harrison MD

## 2019-06-28 RX ORDER — PRAVASTATIN SODIUM 40 MG
TABLET ORAL
Qty: 90 TABLET | Refills: 2 | Status: SHIPPED | OUTPATIENT
Start: 2019-06-28 | End: 2020-05-19 | Stop reason: SDUPTHER

## 2019-09-03 ENCOUNTER — OFFICE VISIT (OUTPATIENT)
Dept: CARDIOLOGY CLINIC | Age: 74
End: 2019-09-03
Payer: MEDICARE

## 2019-09-03 VITALS
HEART RATE: 62 BPM | BODY MASS INDEX: 28.93 KG/M2 | OXYGEN SATURATION: 98 % | DIASTOLIC BLOOD PRESSURE: 80 MMHG | SYSTOLIC BLOOD PRESSURE: 118 MMHG | HEIGHT: 66 IN | WEIGHT: 180 LBS

## 2019-09-03 DIAGNOSIS — E78.2 MIXED HYPERLIPIDEMIA: ICD-10-CM

## 2019-09-03 DIAGNOSIS — I48.3 TYPICAL ATRIAL FLUTTER (HCC): Primary | ICD-10-CM

## 2019-09-03 PROCEDURE — 1123F ACP DISCUSS/DSCN MKR DOCD: CPT | Performed by: INTERNAL MEDICINE

## 2019-09-03 PROCEDURE — 4040F PNEUMOC VAC/ADMIN/RCVD: CPT | Performed by: INTERNAL MEDICINE

## 2019-09-03 PROCEDURE — G8417 CALC BMI ABV UP PARAM F/U: HCPCS | Performed by: INTERNAL MEDICINE

## 2019-09-03 PROCEDURE — 1036F TOBACCO NON-USER: CPT | Performed by: INTERNAL MEDICINE

## 2019-09-03 PROCEDURE — 3017F COLORECTAL CA SCREEN DOC REV: CPT | Performed by: INTERNAL MEDICINE

## 2019-09-03 PROCEDURE — G8427 DOCREV CUR MEDS BY ELIG CLIN: HCPCS | Performed by: INTERNAL MEDICINE

## 2019-09-03 PROCEDURE — 99214 OFFICE O/P EST MOD 30 MIN: CPT | Performed by: INTERNAL MEDICINE

## 2019-09-03 ASSESSMENT — ENCOUNTER SYMPTOMS
ABDOMINAL DISTENTION: 0
WHEEZING: 0
BLOOD IN STOOL: 0
SHORTNESS OF BREATH: 0
COUGH: 0
EYE REDNESS: 0

## 2019-12-10 ENCOUNTER — TELEPHONE (OUTPATIENT)
Dept: CARDIOLOGY CLINIC | Age: 74
End: 2019-12-10

## 2020-01-06 ENCOUNTER — TELEPHONE (OUTPATIENT)
Dept: FAMILY MEDICINE CLINIC | Age: 75
End: 2020-01-06

## 2020-01-06 NOTE — TELEPHONE ENCOUNTER
Patient is scheduled for a yearly medicare check up on 1/30, he is asking to see if he can have his labs drawn before appt including PSA, so you can go over results at office visit.       Please advise, [phone]

## 2020-01-07 NOTE — TELEPHONE ENCOUNTER
[phone] - Left msg for Christiane Vanessa stating that lab orders are in Epic and to be fasting at least 10-12 hours.

## 2020-01-20 RX ORDER — METOPROLOL SUCCINATE 50 MG/1
TABLET, EXTENDED RELEASE ORAL
Qty: 90 TABLET | Refills: 3 | Status: SHIPPED | OUTPATIENT
Start: 2020-01-20 | End: 2021-01-11

## 2020-01-23 DIAGNOSIS — E78.2 MIXED HYPERLIPIDEMIA: ICD-10-CM

## 2020-01-23 DIAGNOSIS — Z12.5 PROSTATE CANCER SCREENING: ICD-10-CM

## 2020-01-23 LAB
A/G RATIO: 1.9 (ref 1.1–2.2)
ALBUMIN SERPL-MCNC: 4.7 G/DL (ref 3.4–5)
ALP BLD-CCNC: 64 U/L (ref 40–129)
ALT SERPL-CCNC: 23 U/L (ref 10–40)
ANION GAP SERPL CALCULATED.3IONS-SCNC: 16 MMOL/L (ref 3–16)
AST SERPL-CCNC: 33 U/L (ref 15–37)
BILIRUB SERPL-MCNC: 0.7 MG/DL (ref 0–1)
BILIRUBIN URINE: NEGATIVE
BLOOD, URINE: NEGATIVE
BUN BLDV-MCNC: 12 MG/DL (ref 7–20)
CALCIUM SERPL-MCNC: 9.5 MG/DL (ref 8.3–10.6)
CHLORIDE BLD-SCNC: 106 MMOL/L (ref 99–110)
CHOLESTEROL, TOTAL: 166 MG/DL (ref 0–199)
CLARITY: CLEAR
CO2: 22 MMOL/L (ref 21–32)
COLOR: ABNORMAL
CREAT SERPL-MCNC: 1.1 MG/DL (ref 0.8–1.3)
EPITHELIAL CELLS, UA: 1 /HPF (ref 0–5)
GFR AFRICAN AMERICAN: >60
GFR NON-AFRICAN AMERICAN: >60
GLOBULIN: 2.5 G/DL
GLUCOSE BLD-MCNC: 96 MG/DL (ref 70–99)
GLUCOSE URINE: NEGATIVE MG/DL
HDLC SERPL-MCNC: 55 MG/DL (ref 40–60)
HYALINE CASTS: 3 /LPF (ref 0–8)
KETONES, URINE: ABNORMAL MG/DL
LDL CHOLESTEROL CALCULATED: 94 MG/DL
LEUKOCYTE ESTERASE, URINE: NEGATIVE
MICROSCOPIC EXAMINATION: YES
NITRITE, URINE: NEGATIVE
PH UA: 5.5 (ref 5–8)
POTASSIUM SERPL-SCNC: 4.5 MMOL/L (ref 3.5–5.1)
PROSTATE SPECIFIC ANTIGEN: 2.99 NG/ML (ref 0–4)
PROTEIN UA: ABNORMAL MG/DL
RBC UA: 2 /HPF (ref 0–4)
SODIUM BLD-SCNC: 144 MMOL/L (ref 136–145)
SPECIFIC GRAVITY UA: 1.03 (ref 1–1.03)
TOTAL PROTEIN: 7.2 G/DL (ref 6.4–8.2)
TRIGL SERPL-MCNC: 83 MG/DL (ref 0–150)
URINE TYPE: ABNORMAL
UROBILINOGEN, URINE: 0.2 E.U./DL
VLDLC SERPL CALC-MCNC: 17 MG/DL
WBC UA: 1 /HPF (ref 0–5)

## 2020-01-30 ENCOUNTER — TELEPHONE (OUTPATIENT)
Dept: CARDIOLOGY CLINIC | Age: 75
End: 2020-01-30

## 2020-01-30 ENCOUNTER — TELEPHONE (OUTPATIENT)
Dept: FAMILY MEDICINE CLINIC | Age: 75
End: 2020-01-30

## 2020-01-30 ENCOUNTER — OFFICE VISIT (OUTPATIENT)
Dept: FAMILY MEDICINE CLINIC | Age: 75
End: 2020-01-30
Payer: MEDICARE

## 2020-01-30 VITALS
SYSTOLIC BLOOD PRESSURE: 128 MMHG | DIASTOLIC BLOOD PRESSURE: 80 MMHG | BODY MASS INDEX: 29.09 KG/M2 | HEIGHT: 66 IN | HEART RATE: 68 BPM | TEMPERATURE: 97.6 F | WEIGHT: 181 LBS

## 2020-01-30 PROCEDURE — G8417 CALC BMI ABV UP PARAM F/U: HCPCS | Performed by: FAMILY MEDICINE

## 2020-01-30 PROCEDURE — 99214 OFFICE O/P EST MOD 30 MIN: CPT | Performed by: FAMILY MEDICINE

## 2020-01-30 PROCEDURE — 1036F TOBACCO NON-USER: CPT | Performed by: FAMILY MEDICINE

## 2020-01-30 PROCEDURE — G8484 FLU IMMUNIZE NO ADMIN: HCPCS | Performed by: FAMILY MEDICINE

## 2020-01-30 PROCEDURE — 93000 ELECTROCARDIOGRAM COMPLETE: CPT | Performed by: FAMILY MEDICINE

## 2020-01-30 PROCEDURE — 3017F COLORECTAL CA SCREEN DOC REV: CPT | Performed by: FAMILY MEDICINE

## 2020-01-30 PROCEDURE — G8427 DOCREV CUR MEDS BY ELIG CLIN: HCPCS | Performed by: FAMILY MEDICINE

## 2020-01-30 PROCEDURE — 1123F ACP DISCUSS/DSCN MKR DOCD: CPT | Performed by: FAMILY MEDICINE

## 2020-01-30 PROCEDURE — 4040F PNEUMOC VAC/ADMIN/RCVD: CPT | Performed by: FAMILY MEDICINE

## 2020-01-30 ASSESSMENT — ENCOUNTER SYMPTOMS
DIARRHEA: 0
SHORTNESS OF BREATH: 0
BLOOD IN STOOL: 0
ABDOMINAL DISTENTION: 0
CONSTIPATION: 0
ABDOMINAL PAIN: 0
COUGH: 0
NAUSEA: 0
CHEST TIGHTNESS: 0
VOMITING: 0

## 2020-01-30 ASSESSMENT — PATIENT HEALTH QUESTIONNAIRE - PHQ9
SUM OF ALL RESPONSES TO PHQ QUESTIONS 1-9: 0
2. FEELING DOWN, DEPRESSED OR HOPELESS: 0
SUM OF ALL RESPONSES TO PHQ QUESTIONS 1-9: 0
SUM OF ALL RESPONSES TO PHQ9 QUESTIONS 1 & 2: 0
1. LITTLE INTEREST OR PLEASURE IN DOING THINGS: 0

## 2020-01-30 NOTE — PROGRESS NOTES
Readings from Last 3 Encounters:  01/30/20 : 181 lb (82.1 kg)  09/03/19 : 180 lb (81.6 kg)  06/25/19 : 182 lb (82.6 kg)    BP Readings from Last 3 Encounters:  01/30/20 : 128/80  09/03/19 : 118/80  06/25/19 : 126/84        Review of Systems   Constitutional: Negative for appetite change, chills, fever and unexpected weight change. Respiratory: Negative for cough, chest tightness and shortness of breath. Cardiovascular: Negative for chest pain, palpitations and leg swelling. Gastrointestinal: Negative for abdominal distention, abdominal pain, blood in stool, constipation, diarrhea, nausea and vomiting. Genitourinary: Negative for difficulty urinating, dysuria and hematuria. Neurological: Negative for headaches. Objective:   Physical Exam  Constitutional:       General: He is not in acute distress. Appearance: Normal appearance. He is well-developed. He is not ill-appearing or diaphoretic. Eyes:      General: No scleral icterus. Neck:      Musculoskeletal: Neck supple. Thyroid: No thyroid mass or thyromegaly. Cardiovascular:      Rate and Rhythm: Normal rate. Rhythm irregular. Heart sounds: Normal heart sounds. No murmur. No friction rub. No gallop. Comments:     Pulmonary:      Effort: Pulmonary effort is normal. No tachypnea, accessory muscle usage or respiratory distress. Breath sounds: Normal breath sounds. No decreased breath sounds, wheezing, rhonchi or rales. Abdominal:      General: Bowel sounds are normal. There is no distension or abdominal bruit. Palpations: Abdomen is soft. There is no hepatomegaly, splenomegaly, mass or pulsatile mass. Tenderness: There is no abdominal tenderness. There is no guarding. Genitourinary:     Prostate: Normal. Not tender and no nodules present. Rectum: Normal. No mass or tenderness.       Comments: Prostate is smooth symmetrical no nodule no pain  And mild enlarged  Lymphadenopathy:      Cervical: No cervical

## 2020-01-30 NOTE — TELEPHONE ENCOUNTER
Patient had an EKG today at Dr. Carlos A Donaldson office. It showed he was in afib. This is not new for him, and he is not symptomatic. They advised him to call our office to have Dr. Shae Rae take a look at the EKG. The patient is scheduled for an appointment in March.

## 2020-01-30 NOTE — TELEPHONE ENCOUNTER
That is fine.  He has had the afib before  In reality just need to call and find out if they want him to do any thing different for the afib he has today

## 2020-03-04 PROBLEM — R55 SYNCOPE AND COLLAPSE: Status: ACTIVE | Noted: 2020-03-04

## 2020-03-04 ASSESSMENT — ENCOUNTER SYMPTOMS
EYE REDNESS: 0
BLOOD IN STOOL: 0
SHORTNESS OF BREATH: 0
WHEEZING: 0
COUGH: 0

## 2020-03-04 NOTE — PROGRESS NOTES
None     Gets together: None     Attends Sabianism service: None     Active member of club or organization: None     Attends meetings of clubs or organizations: None     Relationship status: None    Intimate partner violence     Fear of current or ex partner: None     Emotionally abused: None     Physically abused: None     Forced sexual activity: None   Other Topics Concern    None   Social History Narrative    None     Past Surgical History:   Procedure Laterality Date    COLONOSCOPY  9/26/2001    negative, deak, also neg sigmoid 2007    COLONOSCOPY  2/15/2011    negative dr Ginette Guaman       No Known Allergies  Family History   Problem Relation Age of Onset    Rheum Arthritis Brother     Heart Disease Brother     Heart Disease Mother         older age   [facility] Dementia Father        Recent labs and imaging reviewed. Assessment:       Mixed hyperlipidemia     managed by PCP. On statin. LDL 94 1/2020      Atrial flutter - asymptomatic, CHADS Vasc score 1- on  mg daily. TSH, K+ and Mg levels normal. Recommend rate control strategy. EKG 1/16/13 atrial flutter with CVR. CV cancelled due to pt being in NSR. HM 8/13 NSR, PAC's. EKG 1/2018 Atrial fib with CVR. EKG 1/2020 atrial fib with CVR.  Syncope/Dizziness -  Echo 1/13 LVEF 60%, mild MR TR, slight LAE. 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         Plan:      Atrial fib controlled. No evidence of CHF. No recurrent syncope. No angina. In view of atrial fib/flutter, continue ASA and BB. F/u in office in 6 months    This note was scribed in the presence of the physician by Leanne Cotton RN. The scribes documentation has been prepared under my direction and personally reviewed by me in its entirety. I confirm that the note above accurately reflects all work, treatment, procedures, and medical decision making performed by me.

## 2020-03-12 ENCOUNTER — OFFICE VISIT (OUTPATIENT)
Dept: CARDIOLOGY CLINIC | Age: 75
End: 2020-03-12
Payer: MEDICARE

## 2020-03-12 VITALS
OXYGEN SATURATION: 98 % | HEIGHT: 66 IN | BODY MASS INDEX: 29.51 KG/M2 | SYSTOLIC BLOOD PRESSURE: 118 MMHG | DIASTOLIC BLOOD PRESSURE: 78 MMHG | WEIGHT: 183.6 LBS | HEART RATE: 81 BPM

## 2020-03-12 PROCEDURE — 1123F ACP DISCUSS/DSCN MKR DOCD: CPT | Performed by: INTERNAL MEDICINE

## 2020-03-12 PROCEDURE — G8417 CALC BMI ABV UP PARAM F/U: HCPCS | Performed by: INTERNAL MEDICINE

## 2020-03-12 PROCEDURE — 3017F COLORECTAL CA SCREEN DOC REV: CPT | Performed by: INTERNAL MEDICINE

## 2020-03-12 PROCEDURE — G8427 DOCREV CUR MEDS BY ELIG CLIN: HCPCS | Performed by: INTERNAL MEDICINE

## 2020-03-12 PROCEDURE — 99214 OFFICE O/P EST MOD 30 MIN: CPT | Performed by: INTERNAL MEDICINE

## 2020-03-12 PROCEDURE — 4040F PNEUMOC VAC/ADMIN/RCVD: CPT | Performed by: INTERNAL MEDICINE

## 2020-03-12 PROCEDURE — 1036F TOBACCO NON-USER: CPT | Performed by: INTERNAL MEDICINE

## 2020-03-12 PROCEDURE — G8484 FLU IMMUNIZE NO ADMIN: HCPCS | Performed by: INTERNAL MEDICINE

## 2020-03-12 RX ORDER — ASPIRIN 325 MG
325 TABLET, DELAYED RELEASE (ENTERIC COATED) ORAL DAILY
Qty: 30 TABLET | Refills: 3
Start: 2020-03-12

## 2020-03-12 ASSESSMENT — ENCOUNTER SYMPTOMS: NAUSEA: 0

## 2020-03-12 NOTE — LETTER
[address] Cardiology - [address] [address] [address]  [address] [facility]  Phone: [phone]  Fax: [phone]    Alejo Mcnamara MD        March 18, 2020     [address] [facility]    Patient: Valdemar Rm  MR Number: <[id number]>  YOB: 1945  Date of Visit: 3/12/2020    Dear Dr. Franklin Quijano:    Thank you for your referral. Progress note attached in visit summary. If you have questions, please do not hesitate to call me. I look forward to following Karol Hernandez along with you.     Sincerely,        Alejo Mcnamara MD

## 2020-03-17 ENCOUNTER — TELEPHONE (OUTPATIENT)
Dept: FAMILY MEDICINE CLINIC | Age: 75
End: 2020-03-17

## 2020-05-19 RX ORDER — PRAVASTATIN SODIUM 40 MG
TABLET ORAL
Qty: 90 TABLET | Refills: 1 | Status: SHIPPED | OUTPATIENT
Start: 2020-05-19 | End: 2021-01-12

## 2020-06-24 ENCOUNTER — TELEPHONE (OUTPATIENT)
Dept: FAMILY MEDICINE CLINIC | Age: 75
End: 2020-06-24

## 2020-07-14 ENCOUNTER — OFFICE VISIT (OUTPATIENT)
Dept: FAMILY MEDICINE CLINIC | Age: 75
End: 2020-07-14
Payer: MEDICARE

## 2020-07-14 VITALS
TEMPERATURE: 97.2 F | BODY MASS INDEX: 28.38 KG/M2 | HEART RATE: 72 BPM | SYSTOLIC BLOOD PRESSURE: 128 MMHG | HEIGHT: 66 IN | DIASTOLIC BLOOD PRESSURE: 80 MMHG | WEIGHT: 176.6 LBS

## 2020-07-14 PROCEDURE — 3017F COLORECTAL CA SCREEN DOC REV: CPT | Performed by: FAMILY MEDICINE

## 2020-07-14 PROCEDURE — 1123F ACP DISCUSS/DSCN MKR DOCD: CPT | Performed by: FAMILY MEDICINE

## 2020-07-14 PROCEDURE — G8427 DOCREV CUR MEDS BY ELIG CLIN: HCPCS | Performed by: FAMILY MEDICINE

## 2020-07-14 PROCEDURE — G8417 CALC BMI ABV UP PARAM F/U: HCPCS | Performed by: FAMILY MEDICINE

## 2020-07-14 PROCEDURE — 1036F TOBACCO NON-USER: CPT | Performed by: FAMILY MEDICINE

## 2020-07-14 PROCEDURE — 99214 OFFICE O/P EST MOD 30 MIN: CPT | Performed by: FAMILY MEDICINE

## 2020-07-14 PROCEDURE — 4040F PNEUMOC VAC/ADMIN/RCVD: CPT | Performed by: FAMILY MEDICINE

## 2020-07-14 ASSESSMENT — ENCOUNTER SYMPTOMS
COUGH: 0
ABDOMINAL PAIN: 0
CONSTIPATION: 0
VOMITING: 0
SHORTNESS OF BREATH: 0
DIARRHEA: 0
CHEST TIGHTNESS: 0
NAUSEA: 0
ABDOMINAL DISTENTION: 0
BLOOD IN STOOL: 0

## 2020-07-14 NOTE — PATIENT INSTRUCTIONS
Use the medicine for the neck and the exercises  See the orthopedist for the thigh pain. Dr Bard Holbrook     Patient Education        Neck: Exercises  Introduction  Here are some examples of exercises for you to try. The exercises may be suggested for a condition or for rehabilitation. Start each exercise slowly. Ease off the exercises if you start to have pain. You will be told when to start these exercises and which ones will work best for you. How to do the exercises  Neck stretch   1. This stretch works best if you keep your shoulder down as you lean away from it. To help you remember to do this, start by relaxing your shoulders and lightly holding on to your thighs or your chair. 2. Tilt your head toward your shoulder and hold for 15 to 30 seconds. Let the weight of your head stretch your muscles. 3. If you would like a little added stretch, use your hand to gently and steadily pull your head toward your shoulder. For example, keeping your right shoulder down, lean your head to the left. 4. Repeat 2 to 4 times toward each shoulder. Diagonal neck stretch   1. Turn your head slightly toward the direction you will be stretching, and tilt your head diagonally toward your chest and hold for 15 to 30 seconds. 2. If you would like a little added stretch, use your hand to gently and steadily pull your head forward on the diagonal.  3. Repeat 2 to 4 times toward each side. Dorsal glide stretch   The dorsal glide stretches the back of the neck. If you feel pain, do not glide so far back. Some people find this exercise easier to do while lying on their backs with an ice pack on the neck. 1. Sit or stand tall and look straight ahead. 2. Slowly tuck your chin as you glide your head backward over your body  3. Hold for a count of 6, and then relax for up to 10 seconds. 4. Repeat 8 to 12 times. Chest and shoulder stretch   1.  Sit or stand tall and glide your head backward as in the dorsal glide stretch. 2. Raise both arms so that your hands are next to your ears. 3. Take a deep breath, and as you breathe out, lower your elbows down and behind your back. You will feel your shoulder blades slide down and together, and at the same time you will feel a stretch across your chest and the front of your shoulders. 4. Hold for about 6 seconds, and then relax for up to 10 seconds. 5. Repeat 8 to 12 times. Strengthening: Hands on head   1. Move your head backward, forward, and side to side against gentle pressure from your hands, holding each position for about 6 seconds. 2. Repeat 8 to 12 times. Follow-up care is a key part of your treatment and safety. Be sure to make and go to all appointments, and call your doctor if you are having problems. It's also a good idea to know your test results and keep a list of the medicines you take. Where can you learn more? Go to [email or web address]://[email or web address]. org and sign in to your [employer] account. Enter P975 in the Synchronized box to learn more about \"Neck: Exercises. \"     If you do not have an account, please click on the \"Sign Up Now\" link. Current as of: March 2, 2020               Content Version: 12.5  © 6539-6484 [employer], [employer]. Care instructions adapted under license by [facility] ([facility]). If you have questions about a medical condition or this instruction, always ask your healthcare professional. [place] [address] any warranty or liability for your use of this information.          Patient Education

## 2020-07-14 NOTE — PROGRESS NOTES
Subjective:      Patient ID: Orlin Douglas is a 70 y.o. male. Patient presents with:  6 Month Follow-Up: lipids - pt is fasting    Here for check up  Doing well and no c/o  Exercising daily   meds the same    Family is well. Brother  recently. He was doing work at home and he strained the left upper back muscle. And left lateral neck and hurts to turn head to the left   He also during this time strained or hurt the right proximal thigh posteriorly  Hurts to sit on it since march. He is better but if sits for long period of time will have discomfort    YOB: 1945    Date of Visit:  2020    No Known Allergies    Current Outpatient Medications:  pravastatin (PRAVACHOL) 40 MG tablet, TAKE ONE TABLET BY MOUTH EVERY EVENING, Disp: 90 tablet, Rfl: 1  aspirin 325 MG EC tablet, Take 1 tablet by mouth daily, Disp: 30 tablet, Rfl: 3  metoprolol succinate (TOPROL XL) 50 MG extended release tablet, TAKE 1 TABLET BY MOUTH EVERY DAY, Disp: 90 tablet, Rfl: 3  clotrimazole (LOTRIMIN) 1 % cream, Apply topically 2 times daily Apply topically 2 times daily. , Disp: , Rfl:   Acetaminophen 650 MG TABS, Take by mouth 2 times daily , Disp: , Rfl:   diclofenac sodium 1 % GEL, Apply 2 g topically as needed. , Disp: , Rfl:   Glucosamine-Chondroitin-MSM (TRIPLE FLEX PO), Take by mouth 3 times daily , Disp: , Rfl:   Terbinafine HCl (LAMISIL EX), Apply  topically. , Disp: , Rfl:   Multiple Vitamins-Minerals (MADHU MULTIVITAMIN FOR MEN PO), Take  by mouth.  For memory takes 1 tablet, Disp: , Rfl:   SAW PALMETTO, Take by mouth 2 times daily , Disp: , Rfl:     No current facility-administered medications for this visit.       ---------------------------------                  20                            0801            ---------------------------------   BP:             128/80            Site:       Left Upper Arm        Position:        Sitting           Cuff Size:     Medium Adult wheezing, rhonchi or rales. Musculoskeletal:      Comments: rom of the right leg is ok but he is tender at the muscle areas of the right ischial tuberosity no skin changes no masses    Lymphadenopathy:      Head:      Right side of head: No submental, submandibular, preauricular or posterior auricular adenopathy. Left side of head: No submental, submandibular, preauricular or posterior auricular adenopathy. Cervical: No cervical adenopathy. Upper Body:      Right upper body: No supraclavicular adenopathy. Left upper body: No supraclavicular adenopathy. Skin:     General: Skin is warm and dry. Coloration: Skin is not pale. Neurological:      General: No focal deficit present. Mental Status: He is alert. Assessment:        Diagnosis Orders   1. Mixed hyperlipidemia     2. Neck pain on left side     3. Left-sided ischial pain       Orders Placed This Encounter   Medications    diclofenac (VOLTAREN) 50 MG EC tablet     Sig: Take 1 tablet by mouth 2 times daily (with meals)     Dispense:  30 tablet     Refill:  0       discussed the psa and ramifications including occult ca and options  He just wants to do the observation and did not want to see urology  ischial bursitis? Plan:      Use the medicine for the neck and the exercises  See the orthopedist for the thigh pain.  Dr Shagufta Mcnulty MD

## 2020-08-04 ENCOUNTER — OFFICE VISIT (OUTPATIENT)
Dept: ORTHOPEDIC SURGERY | Age: 75
End: 2020-08-04
Payer: MEDICARE

## 2020-08-04 VITALS — WEIGHT: 175 LBS | TEMPERATURE: 97.4 F | BODY MASS INDEX: 28.12 KG/M2 | HEIGHT: 66 IN

## 2020-08-04 PROCEDURE — G8417 CALC BMI ABV UP PARAM F/U: HCPCS | Performed by: ORTHOPAEDIC SURGERY

## 2020-08-04 PROCEDURE — 3017F COLORECTAL CA SCREEN DOC REV: CPT | Performed by: ORTHOPAEDIC SURGERY

## 2020-08-04 PROCEDURE — 1123F ACP DISCUSS/DSCN MKR DOCD: CPT | Performed by: ORTHOPAEDIC SURGERY

## 2020-08-04 PROCEDURE — 99203 OFFICE O/P NEW LOW 30 MIN: CPT | Performed by: ORTHOPAEDIC SURGERY

## 2020-08-04 PROCEDURE — 4040F PNEUMOC VAC/ADMIN/RCVD: CPT | Performed by: ORTHOPAEDIC SURGERY

## 2020-08-04 PROCEDURE — 1036F TOBACCO NON-USER: CPT | Performed by: ORTHOPAEDIC SURGERY

## 2020-08-04 PROCEDURE — G8427 DOCREV CUR MEDS BY ELIG CLIN: HCPCS | Performed by: ORTHOPAEDIC SURGERY

## 2020-08-04 NOTE — PROGRESS NOTES
right    Pules (0-4) Dorsalis  Pedis Posterior  Tibialis   Right 2+    Left       Range of Motion of hip: in degrees   Flexion External  Rotation Internal  Rotation Extension Adduction Abduction   Right 95 30 20 pain in knee 0 15 30   Left 95 30 20 0 15 30     Motor Exam:                        Normal=N   Weak=W   Unable=U   Toe Walk Heel Walk Single Leg Squat   Right N N N   Left N N N     Strength Scale: scale of 1-5   Extensor Hallucis Longus L5 Anterior Tibialis L4 Quadraceps L2,3,4   Right 5 5 5   Left 5 5 5     Reflex Exam: Scale of 0-4   Achilles Tendon (gastroc)   S1 Patellar Tendon (quads) L4   Right 1+ 2   Left 2-3+ 2+     Sensory Exam  Decrease sensation L4 and S1 on right  Special Testing:     Log Roll Axel Sea  (VALENTE) FADIR Straight Leg Raising [facility] Trendelenberg Iliopsoas Stress Test   Right medial Knee pain neg Medial knee pain Ischial tuberosity pain  neg neg   Left neg   neg  neg      Piriformis: neg    X-Ray Findings taken in Office: 3 views right hip with pelvis read by myself show minimal loss of joint space of his right hip associated with slight sclerosis of his right acetabulum compared to left. No evidence of osteophytes. No evidence of lytic disease. No evidence of blastic disease or fracture. Impression:   1. Enthesopathy of his right hamstring insertion into his ischial tuberosity is most likely the cause of his current pain. 2.  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  3.  Decreased sensation in the lateral aspect of his right foot as well as decreased right gastroc reflex could indicate an old right S1 radiculopathy, however this does not appear to be the cause of his current pain. 4.  No evidence of neoplastic disease on current x-rays. Plan/Treatment:   1.   He will be treated with physical therapy for stretching of his right hip including right hamstring tendons to see if this relieves his pain. 2.  He will return to the office in 4 weeks for repeat exam.  If his pain is not relieved, further evaluation including possible right injection might be considered. Zeenat Banks MD  8/4/2020    This dictation was done with Dragon dictation and may contain mechanical errors related to translation.

## 2020-08-11 ENCOUNTER — HOSPITAL ENCOUNTER (OUTPATIENT)
Dept: PHYSICAL THERAPY | Age: 75
Setting detail: THERAPIES SERIES
Discharge: HOME | End: 2020-08-11
Payer: MEDICARE

## 2020-08-11 PROCEDURE — 97530 THERAPEUTIC ACTIVITIES: CPT

## 2020-08-11 PROCEDURE — 97162 PT EVAL MOD COMPLEX 30 MIN: CPT

## 2020-08-11 PROCEDURE — 97110 THERAPEUTIC EXERCISES: CPT

## 2020-08-11 NOTE — PROGRESS NOTES
Physical Therapy  Initial Assessment  Date: 2020  Patient Name: Juanito Tuttle  MRN: [id number]  : 1945     Treatment Diagnosis: Ischial Bursitis, Muscle Imbalance of Core and B LEs    Restrictions  Restrictions/Precautions: Fall Risk(low)    Subjective   General  Chart Reviewed: Yes  Patient assessed for rehabilitation services?: Yes  Referring Practitioner: Ann Calles MD  Referral Date : 20  Diagnosis: Ischial Bursitis of Right Side M70.71  PT Visit Information  Onset Date: (2020)  PT Insurance Information: Medicare    Subjective: Patient reports a gradual onset of R ischial pain since 2020. \"If I drive, it gets sore. Right now, it's a light ache. \"  Patient reports \"a little sensitive\" with walking \"it feels like I put a bean bag in my pccket and sat on it. Pain increases with increased sitting. It's when it's long >/=5-10 minutes, the ache increases. Pain reduces after standing about 10 minutes. Patient has a very big landscape garden, has some discomfort. Negotiating steps, or ladder does increase pain, but \"not as much as sitting\". Laying down feels much better,  but \"it gets uncomfortable if I lay in one position too long </= 20 minutes. Bending forward to stretch legs is very painful.    Pain: 1/10 at rest, 3/10 wtih activity    Vision/Hearing  Vision: Within Functional Limits(with glasses)  Hearing: Within functional limits    Orientation  Overall Orientation Status: Within Normal Limits    Social/Functional History  Social/Functional History  Lives With: Spouse  Type of Home: House  Home Layout: One level  Home Access: Stairs to enter without rails  Entrance Stairs - Number of Steps: 2 +2 steps to enter  [employer] Shower/Tub: Walk-in shower  Bathroom Toilet: Standard  ADL Assistance: Independent  Homemaking Assistance: Needs assistance  Homemaking Responsibilities: Yes  Ambulation Assistance: Independent  Active : Yes  Occupation: Retired  Leisure & Hobbies: garden, stone sculpting    Objective  Posture: Fair(hips and knees flexed)  Palpation: tenderness noted R ischial tuberosity,  RLE AROM: WFL  LLE AROM : WFL  Strength RLE: WFL          Strength LLE: WFL  Flexibility: Mod tight B hip flexors, L Hamstrings, B gastrocs; mod/max tight R hamstrngs  Special Tests: SLR negative B; SI Instability negative B  Overall Sensation Status: WFL(B LEs intact to Light Touch)  Ambulation: Patient ambulates with trunk flexed, B hips and knees flexed, decreased stride length, lat sway, wide base of support  Balance  Single Leg Stance R Leg: (Poor+)  Single Leg Stance L Leg: (Fair-)    Assessment   Conditions Requiring Skilled Therapeutic Intervention  Body structures, Functions, Activity limitations: Decreased functional mobility ; Decreased high-level IADLs;Decreased posture;Decreased endurance;Decreased ROM; Decreased strength;Decreased balance; Increased pain  Assessment: Patient presents with decreased functional mobility consistent with ischial bursitis and muscle imbalance of core and B LEs. Patient would benefit from PT to increase functional mobility.     Prior Level of Function:  Independent  Treatment Diagnosis: Ischial Bursitis, Muscle Imbalance of Core and B LEs  Prognosis: Fair;Good  Decision Making: Medium Complexity  REQUIRES PT FOLLOW UP: Yes  Activity Tolerance: Patient Tolerated treatment well         Plan : Patient will be seen 1-3 times per week for 4-6 weeks  Current Treatment Recommendations: Strengthening, Gait Training, Patient/Caregiver Education & Training, Stair training, ROM, Balance Training, Pain Management, Modalities, Home Exercise Program, Manual Therapy - Soft Tissue Mobilization, Endurance Training    OutComes Score  LEFS Total Score: 36 (08/11/20 1601)    Goals  Short term goals  Time Frame for Short term goals: 4-6 weeks  Short term goal 1: Pain </= 0/10 at rest, 2/10 with activity  Short term goal 2: patient able to sit >/= 45 minutes without increased symptoms  Short term goal 3: Patient able to stand and walk >/= 20 minutes without increased symptoms  Short term goal 4: Patient able to demonstrate Good flexibility of B hamstrings  Short term goal 5: Patient independent with written home exercise program  Patient Goals   Patient goals : \"Relief of pain and discomfort\"       Therapy Time   Individual Concurrent Group Co-treatment   Time In 0845         Time Out 0935         Minutes 50         Timed Code Treatment Minutes: [employer], [address] [facility]

## 2020-08-11 NOTE — FLOWSHEET NOTE
recruitment. Home Exercise Program:  8/11/20:   Patient instructed in glut sets, quad sets, isometric hipadd, quad sets, SLR, sitting in chair hamstring stretch ; written instructions with pictures issued, patient able to demonstrate exercises. Manual Treatments:     Modalities:     Progression Towards Functional goals:  [] Patient is progressing as expected towards functional goals listed. [] Progression is slowed due to complexities listed. [] Progression has been slowed due to co-morbidities. [x] Plan just implemented, too soon to assess goals progression  [] Other:    Charges: Therapeutic Exercise:  [x] (99767) Provided verbal/tactile cueing for activities to restore or maintain strength, flexibility, endurance, ROM for improvements with self-care, mobility, lifting and ambulation. Neuromuscular Re-Education  [] (90419) Provided verbal/tactile cueing for activities to restore or maintain balance, coordination, kinesthetic sense, posture, motor skill, proprioception for self-care, mobility, lifting, and ambulation. Therapeutic Activities:    [x] (03871) Provided verbal/tactile cueing to address functional limitations related to loss of mobility, strength, balance, and coordination.      Gait Training:  [] (15601) Provided training and instruction to the patient for proper postural muscle recruitment and positioning with ambulation re-education     Home Exercise Program:    [x] (35429) Reviewed/Progressed HEP activities related to strengthening, flexibility, endurance, ROM for functional self-care, mobility, lifting and ambulation   [] (54640) Reviewed/Progressed HEP activities related to improving balance, coordination, kinesthetic sense, posture, motor skill, proprioception for self-care, mobility, lifting, and ambulation      Manual Treatments:  MFR / STM / Oscillations-Mobs:  G-I, II, III, IV / Manipulation / MLD  [] (49285) Provided manual therapy to mobilize  soft tissue/joints/fluid for [address] [facility], [address] [facility]

## 2020-08-11 NOTE — PLAN OF CARE
Outpatient Physical Therapy  [] [facility]    Phone: [phone]   Fax: [phone]   [] [facility]  Phone: [phone]              Fax: [phone]  [x] [place]   Phone: [phone]   Fax: [phone]     To: Referring Practitioner: Rima Feng MD      Patient: Zeynep Melgar   : 1945   MRN: [id number]  Evaluation Date: 2020      Diagnosis Information:  · Diagnosis: Ischial Bursitis of Right Side M70.71   · Treatment Diagnosis: Ischial Bursitis, Muscle Imbalance of Core and B LEs     Physical Therapy Certification  Dear Dr. Keysha Grewal,  The following patient has been evaluated for physical therapy services and for therapy to continue, Medicare requires monthly physician review of the treatment plan. Please review the attached evaluation and/or summary of the patient's plan of care, and verify that you agree therapy should continue by signing the attached document and sending it back to our office. Plan of Care/Treatment to date:  [x] Therapeutic Exercise    [x] Modalities:  [x] Therapeutic Activity     [] Ultrasound  [] Electrical Stimulation  [x] Gait Training      [] Cervical Traction [] Lumbar Traction  [] Neuromuscular Re-education    [] Cold/hotpack [] Iontophoresis   [x] Instruction in HEP     Other:  [x] Manual Therapy      []             [] Aquatic Therapy      []           ? Frequency/Duration: 1-3 times per week for 4-6 weeks    Rehab Potential: [] Excellent [x] Good [] Fair  [] Poor       Electronically signed by:  Tim Villatoro, PT 0901      If you have any questions or concerns, please don't hesitate to call.   Thank you for your referral.      Physician Signature:________________________________Date:__________________  By signing above, therapists plan is approved by physician

## 2020-08-12 ENCOUNTER — APPOINTMENT (OUTPATIENT)
Dept: PHYSICAL THERAPY | Age: 75
End: 2020-08-12
Payer: MEDICARE

## 2020-08-18 ENCOUNTER — APPOINTMENT (OUTPATIENT)
Dept: PHYSICAL THERAPY | Age: 75
End: 2020-08-18
Payer: MEDICARE

## 2020-08-20 ENCOUNTER — HOSPITAL ENCOUNTER (OUTPATIENT)
Dept: PHYSICAL THERAPY | Age: 75
Setting detail: THERAPIES SERIES
Discharge: HOME | End: 2020-08-20
Payer: MEDICARE

## 2020-08-20 PROCEDURE — 97140 MANUAL THERAPY 1/> REGIONS: CPT

## 2020-08-20 PROCEDURE — 97110 THERAPEUTIC EXERCISES: CPT

## 2020-08-20 NOTE — FLOWSHEET NOTE
reps Seat, Tibia, #2                  Calf Stretch 3 x 30 sec Incline board                                              Other Therapeutic Activities:  8/11/20:  Discussed at length anatomy and biomechanics of the hamstrings, muscle reeducation, motor recruitment. Home Exercise Program:  8/11/20:   Patient instructed in glut sets, quad sets, isometric hipadd, quad sets, SLR, sitting in chair hamstring stretch ; written instructions with pictures issued, patient able to demonstrate exercises. Manual Treatments: soft tissue mob to R hamstrings, ischial tub, x 15 minutes    Modalities:     Progression Towards Functional goals:  [] Patient is progressing as expected towards functional goals listed. [] Progression is slowed due to complexities listed. [] Progression has been slowed due to co-morbidities. [x] Plan just implemented, too soon to assess goals progression  [] Other:    Charges: Therapeutic Exercise:  [x] (31322) Provided verbal/tactile cueing for activities to restore or maintain strength, flexibility, endurance, ROM for improvements with self-care, mobility, lifting and ambulation. Neuromuscular Re-Education  [] (29524) Provided verbal/tactile cueing for activities to restore or maintain balance, coordination, kinesthetic sense, posture, motor skill, proprioception for self-care, mobility, lifting, and ambulation. Therapeutic Activities:    [x] (07184) Provided verbal/tactile cueing to address functional limitations related to loss of mobility, strength, balance, and coordination.      Gait Training:  [] (34050) Provided training and instruction to the patient for proper postural muscle recruitment and positioning with ambulation re-education     Home Exercise Program:    [] (40798) Reviewed/Progressed HEP activities related to strengthening, flexibility, endurance, ROM for functional self-care, mobility, lifting and ambulation   [] (32201) Reviewed/Progressed HEP activities related to improving balance, coordination, kinesthetic sense, posture, motor skill, proprioception for self-care, mobility, lifting, and ambulation      Manual Treatments:  MFR / STM / Oscillations-Mobs:  G-I, II, III, IV / Manipulation / MLD  [x] (78404) Provided manual therapy to mobilize  soft tissue/joints/fluid for the purpose of modulating pain, promoting relaxation, increasing ROM, reducing/eliminating soft tissue swelling/inflammation/restriction, improving soft tissue extensibility and allowing for proper ROM for normal function with self- care, mobility, lifting and ambulation.         Timed Code Treatment Minutes: 45   Total Treatment Minutes: 45     [] EVAL (LOW) 26866   [] EVAL (MOD) 20626   [] EVAL (HIGH) 27984   [] RE-EVAL   [x] TE (86872) x  2   [] Aquatic (53878) x  [] NMR (10818)   x  [] Aquatic Group (02665) x  [x] Manual (94898) x    [] Ultrasound (16654) x  [] TA (64331) x  [] Mech Traction (51798)  [] Ionto (59872)           [] ES (un) (40181):   [] Vasopump (25694) [] Other:      Assessment  [x] Patient tolerated treatment well [] Patient limited by fatigue  [] Patient limited by pain  [] Patient limited by other medical complications  [] Other:     Prognosis: [x] Good [] Fair  [] Poor    Goals:    Short term goals  Time Frame for Short term goals: 4-6 weeks  Short term goal 1: Pain </= 0/10 at rest, 2/10 with activity  Short term goal 2: patient able to sit >/= 45 minutes without increased symptoms  Short term goal 3: Patient able to stand and walk >/= 20 minutes without increased symptoms  Short term goal 4: Patient able to demonstrate Good flexibility of B hamstrings  Short term goal 5: Patient independent with written home exercise program            Patient Requires Follow-up: [x] Yes  [] No    Plan:   [] Continue per plan of care [] Alter current plan (see comments)  [x] Plan of care initiated [] Hold pending MD visit [] Discharge  Note: If patient does not return for scheduled/ recommended follow up visits, this note will serve as a discharge from care along with most recent update on progress. Plan for Next Session:  Monitor response. Initiate Leg Press if able. .    Electronically signed by:  Mendy Hammer, [address] [facility]

## 2020-08-25 ENCOUNTER — HOSPITAL ENCOUNTER (OUTPATIENT)
Dept: PHYSICAL THERAPY | Age: 75
Setting detail: THERAPIES SERIES
Discharge: HOME | End: 2020-08-25
Payer: MEDICARE

## 2020-08-25 PROCEDURE — 97140 MANUAL THERAPY 1/> REGIONS: CPT

## 2020-08-25 PROCEDURE — 97110 THERAPEUTIC EXERCISES: CPT

## 2020-08-25 NOTE — FLOWSHEET NOTE
Isometrics @ 90,70 50, 30 x 5 sec x 5 reps Seat, Tibia #2   Leg Curl Multiple angle isometrics @ 30, 50, 70, 90 x 5 sec x 5 reps Seat, Tibia, #2   Leg Press 60 pounds, 2 x 10 reps Seat #3             Calf Stretch 3 x 30 sec Incline board                                              Other Therapeutic Activities:  8/11/20:  Discussed at length anatomy and biomechanics of the hamstrings, muscle reeducation, motor recruitment. Home Exercise Program:  8/11/20:   Patient instructed in glut sets, quad sets, isometric hipadd, quad sets, SLR, sitting in chair hamstring stretch ; written instructions with pictures issued, patient able to demonstrate exercises  8/25/20:  Reviewed HEP    Manual Treatments: soft tissue mob to R hamstrings, ischial tub, x 15 minutes    Modalities:     Progression Towards Functional goals:  [] Patient is progressing as expected towards functional goals listed. [] Progression is slowed due to complexities listed. [] Progression has been slowed due to co-morbidities. [x] Plan just implemented, too soon to assess goals progression  [] Other:    Charges: Therapeutic Exercise:  [x] (86589) Provided verbal/tactile cueing for activities to restore or maintain strength, flexibility, endurance, ROM for improvements with self-care, mobility, lifting and ambulation. Neuromuscular Re-Education  [] (66973) Provided verbal/tactile cueing for activities to restore or maintain balance, coordination, kinesthetic sense, posture, motor skill, proprioception for self-care, mobility, lifting, and ambulation. Therapeutic Activities:    [x] (50807) Provided verbal/tactile cueing to address functional limitations related to loss of mobility, strength, balance, and coordination.      Gait Training:  [] (86082) Provided training and instruction to the patient for proper postural muscle recruitment and positioning with ambulation re-education     Home Exercise Program:    [] (36106) Reviewed/Progressed HEP activities related to strengthening, flexibility, endurance, ROM for functional self-care, mobility, lifting and ambulation   [] (11235) Reviewed/Progressed HEP activities related to improving balance, coordination, kinesthetic sense, posture, motor skill, proprioception for self-care, mobility, lifting, and ambulation      Manual Treatments:  MFR / STM / Oscillations-Mobs:  G-I, II, III, IV / Manipulation / MLD  [x] (48269) Provided manual therapy to mobilize  soft tissue/joints/fluid for the purpose of modulating pain, promoting relaxation, increasing ROM, reducing/eliminating soft tissue swelling/inflammation/restriction, improving soft tissue extensibility and allowing for proper ROM for normal function with self- care, mobility, lifting and ambulation.         Timed Code Treatment Minutes: 45   Total Treatment Minutes: 45     [] EVAL (LOW) 86642   [] EVAL (MOD) 02732   [] EVAL (HIGH) 45570   [] RE-EVAL   [x] TE (96583) x  2   [] Aquatic (34799) x  [] NMR (17350)   x  [] Aquatic Group (46537) x  [x] Manual (11614) x    [] Ultrasound (51159) x  [] TA (99595) x  [] Mech Traction (81476)  [] Ionto (25485)           [] ES (un) (86516):   [] Vasopump (16198) [] Other:      Assessment  [x] Patient tolerated treatment well [] Patient limited by fatigue  [] Patient limited by pain  [] Patient limited by other medical complications  [] Other:     Prognosis: [x] Good [] Fair  [] Poor    Goals:    Short term goals  Time Frame for Short term goals: 4-6 weeks  Short term goal 1: Pain </= 0/10 at rest, 2/10 with activity  Short term goal 2: patient able to sit >/= 45 minutes without increased symptoms  Short term goal 3: Patient able to stand and walk >/= 20 minutes without increased symptoms  Short term goal 4: Patient able to demonstrate Good flexibility of B hamstrings  Short term goal 5: Patient independent with written home exercise program            Patient Requires Follow-up: [x] Yes  [] No    Plan:   [] Continue per plan of care [] Alter current plan (see comments)  [x] Plan of care initiated [] Hold pending MD visit [] Discharge  Note: If patient does not return for scheduled/ recommended follow up visits, this note will serve as a discharge from care along with most recent update on progress. Plan for Next Session:  Monitor response. Initiate Bent Leg Fall Out for HEP if able. .    Electronically signed by:  Zaira Ortiz, [address] [place] [address] One

## 2020-08-27 ENCOUNTER — HOSPITAL ENCOUNTER (OUTPATIENT)
Dept: PHYSICAL THERAPY | Age: 75
Setting detail: THERAPIES SERIES
Discharge: HOME | End: 2020-08-27
Payer: MEDICARE

## 2020-08-27 PROCEDURE — 97140 MANUAL THERAPY 1/> REGIONS: CPT

## 2020-08-27 PROCEDURE — 97110 THERAPEUTIC EXERCISES: CPT

## 2020-08-27 NOTE — FLOWSHEET NOTE
Tibia #2   Leg Curl 11 pounds, 2 x 10 reps R/L Seat, Tibia, #2   Leg Press 60 pounds, 3 x 10 reps Seat #3             Calf Stretch 3 x 30 sec Incline board                                              Other Therapeutic Activities:  8/11/20:  Discussed at length anatomy and biomechanics of the hamstrings, muscle reeducation, motor recruitment. Home Exercise Program:  8/11/20:   Patient instructed in glut sets, quad sets, isometric hipadd, quad sets, SLR, sitting in chair hamstring stretch ; written instructions with pictures issued, patient able to demonstrate exercises  8/25/20:  Reviewed HEP  8/27/20:   Patient instructed in bent leg fall out, sidelying clamshell with pillow ; written instructions with pictures issued, patient able to demonstrate exercises. Manual Treatments: soft tissue mob to R hamstrings, ischial tub, x 15 minutes    Modalities:     Progression Towards Functional goals:  [x] Patient is progressing as expected towards functional goals listed. [] Progression is slowed due to complexities listed. [] Progression has been slowed due to co-morbidities. [] Plan just implemented, too soon to assess goals progression  [] Other:    Charges: Therapeutic Exercise:  [x] (98322) Provided verbal/tactile cueing for activities to restore or maintain strength, flexibility, endurance, ROM for improvements with self-care, mobility, lifting and ambulation. Neuromuscular Re-Education  [] (02756) Provided verbal/tactile cueing for activities to restore or maintain balance, coordination, kinesthetic sense, posture, motor skill, proprioception for self-care, mobility, lifting, and ambulation. Therapeutic Activities:    [] (92106) Provided verbal/tactile cueing to address functional limitations related to loss of mobility, strength, balance, and coordination.      Gait Training:  [] (66754) Provided training and instruction to the patient for proper postural muscle recruitment and positioning with ambulation re-education     Home Exercise Program:    [x] (90815) Reviewed/Progressed HEP activities related to strengthening, flexibility, endurance, ROM for functional self-care, mobility, lifting and ambulation   [] (53222) Reviewed/Progressed HEP activities related to improving balance, coordination, kinesthetic sense, posture, motor skill, proprioception for self-care, mobility, lifting, and ambulation      Manual Treatments:  MFR / STM / Oscillations-Mobs:  G-I, II, III, IV / Manipulation / MLD  [x] (74279) Provided manual therapy to mobilize  soft tissue/joints/fluid for the purpose of modulating pain, promoting relaxation, increasing ROM, reducing/eliminating soft tissue swelling/inflammation/restriction, improving soft tissue extensibility and allowing for proper ROM for normal function with self- care, mobility, lifting and ambulation.         Timed Code Treatment Minutes: 45   Total Treatment Minutes: 45     [] EVAL (LOW) 62218   [] EVAL (MOD) 45687   [] EVAL (HIGH) 02544   [] RE-EVAL   [x] TE (85303) x  2   [] Aquatic (62122) x  [] NMR (03608)   x  [] Aquatic Group (19014) x  [x] Manual (88913) x    [] Ultrasound (58012) x  [] TA (11922) x  [] Mech Traction (65805)  [] Ionto (20182)           [] ES (un) (25612):   [] Vasopump (82139) [] Other:      Assessment  [x] Patient tolerated treatment well [] Patient limited by fatigue  [] Patient limited by pain  [] Patient limited by other medical complications  [] Other:     Prognosis: [x] Good [] Fair  [] Poor    Goals:    Short term goals  Time Frame for Short term goals: 4-6 weeks  Short term goal 1: Pain </= 0/10 at rest, 2/10 with activity  Short term goal 2: patient able to sit >/= 45 minutes without increased symptoms  Short term goal 3: Patient able to stand and walk >/= 20 minutes without increased symptoms  Short term goal 4: Patient able to demonstrate Good flexibility of B hamstrings  Short term goal 5: Patient independent with written home exercise program            Patient Requires Follow-up: [x] Yes  [] No    Plan:   [] Continue per plan of care [] Alter current plan (see comments)  [x] Plan of care initiated [] Hold pending MD visit [] Discharge  Note: If patient does not return for scheduled/ recommended follow up visits, this note will serve as a discharge from care along with most recent update on progress. Plan for Next Session:  Monitor response. Initiate prone and/or longsitting hip IR/ER for HEP if able. .    Electronically signed by:  Adrienne Dozier, [address] [facility] [place]

## 2020-08-31 ENCOUNTER — HOSPITAL ENCOUNTER (OUTPATIENT)
Dept: PHYSICAL THERAPY | Age: 75
Setting detail: THERAPIES SERIES
Discharge: HOME | End: 2020-08-31
Payer: MEDICARE

## 2020-08-31 PROCEDURE — 97110 THERAPEUTIC EXERCISES: CPT

## 2020-08-31 PROCEDURE — 97140 MANUAL THERAPY 1/> REGIONS: CPT

## 2020-08-31 NOTE — FLOWSHEET NOTE
R/L Seat, Tibia #2   Leg Curl 11 pounds, 2 x 10 reps R/L Seat, Tibia, #2   Leg Press 60 pounds, 3 x 10 reps Seat #3             Calf Stretch 3 x 30 sec Incline board                                              Other Therapeutic Activities:  8/11/20:  Discussed at length anatomy and biomechanics of the hamstrings, muscle reeducation, motor recruitment. Home Exercise Program:  8/11/20:   Patient instructed in glut sets, quad sets, isometric hipadd, quad sets, SLR, sitting in chair hamstring stretch ; written instructions with pictures issued, patient able to demonstrate exercises  8/25/20:  Reviewed HEP  8/27/20:   Patient instructed in bent leg fall out, sidelying clamshell with pillow ; written instructions with pictures issued, patient able to demonstrate exercises. 8/31/20:   Patient instructed in longsitting and prone hip IR/ER ; written instructions with pictures issued, patient able to demonstrate exercises. Manual Treatments: soft tissue mob to R hamstrings, ischial tub, x 15 minutes    Modalities:     Progression Towards Functional goals:  [x] Patient is progressing as expected towards functional goals listed. [] Progression is slowed due to complexities listed. [] Progression has been slowed due to co-morbidities. [] Plan just implemented, too soon to assess goals progression  [] Other:    Charges: Therapeutic Exercise:  [x] (48393) Provided verbal/tactile cueing for activities to restore or maintain strength, flexibility, endurance, ROM for improvements with self-care, mobility, lifting and ambulation. Neuromuscular Re-Education  [] (63548) Provided verbal/tactile cueing for activities to restore or maintain balance, coordination, kinesthetic sense, posture, motor skill, proprioception for self-care, mobility, lifting, and ambulation.      Therapeutic Activities:    [] (58618) Provided verbal/tactile cueing to address functional limitations related to loss of mobility, strength, balance, and coordination. Gait Training:  [] (11482) Provided training and instruction to the patient for proper postural muscle recruitment and positioning with ambulation re-education     Home Exercise Program:    [x] (64222) Reviewed/Progressed HEP activities related to strengthening, flexibility, endurance, ROM for functional self-care, mobility, lifting and ambulation   [] (79924) Reviewed/Progressed HEP activities related to improving balance, coordination, kinesthetic sense, posture, motor skill, proprioception for self-care, mobility, lifting, and ambulation      Manual Treatments:  MFR / STM / Oscillations-Mobs:  G-I, II, III, IV / Manipulation / MLD  [x] (42877) Provided manual therapy to mobilize  soft tissue/joints/fluid for the purpose of modulating pain, promoting relaxation, increasing ROM, reducing/eliminating soft tissue swelling/inflammation/restriction, improving soft tissue extensibility and allowing for proper ROM for normal function with self- care, mobility, lifting and ambulation.         Timed Code Treatment Minutes: 45   Total Treatment Minutes: 45     [] EVAL (LOW) 68113   [] EVAL (MOD) 66820   [] EVAL (HIGH) 99599   [] RE-EVAL   [x] TE (05511) x  2   [] Aquatic (58472) x  [] NMR (72010)   x  [] Aquatic Group (45076) x  [x] Manual (14334) x    [] Ultrasound (89157) x  [] TA (97931) x  [] Mech Traction (40847)  [] Ionto (57372)           [] ES (un) (10791):   [] Vasopump (17328) [] Other:      Assessment  [x] Patient tolerated treatment well [] Patient limited by fatigue  [] Patient limited by pain  [] Patient limited by other medical complications  [] Other:     Prognosis: [x] Good [] Fair  [] Poor    Goals:    Short term goals  Time Frame for Short term goals: 4-6 weeks  Short term goal 1: Pain </= 0/10 at rest, 2/10 with activity  Short term goal 2: patient able to sit >/= 45 minutes without increased symptoms  Short term goal 3: Patient able to stand and walk >/= 20 minutes without increased symptoms  Short term goal 4: Patient able to demonstrate Good flexibility of B hamstrings  Short term goal 5: Patient independent with written home exercise program            Patient Requires Follow-up: [x] Yes  [] No    Plan:   [] Continue per plan of care [] Alter current plan (see comments)  [x] Plan of care initiated [] Hold pending MD visit [] Discharge  Note: If patient does not return for scheduled/ recommended follow up visits, this note will serve as a discharge from care along with most recent update on progress. Plan for Next Session:  Monitor response. Initiate prone and/or  standing hip ext for HEP if able. .    Electronically signed by:  Idalia Thompson, [address] [facility] [address]

## 2020-09-03 ENCOUNTER — HOSPITAL ENCOUNTER (OUTPATIENT)
Dept: PHYSICAL THERAPY | Age: 75
Setting detail: THERAPIES SERIES
Discharge: HOME | End: 2020-09-03
Payer: MEDICARE

## 2020-09-03 PROCEDURE — 97140 MANUAL THERAPY 1/> REGIONS: CPT

## 2020-09-03 PROCEDURE — 97110 THERAPEUTIC EXERCISES: CPT

## 2020-09-03 NOTE — FLOWSHEET NOTE
Physical Therapy Daily Treatment Note  Date:  9/3/2020    Patient Name:  Nemesio Umaña    :    MRN: [id number]    Restrictions/Precautions:  Restrictions/Precautions  Restrictions/Precautions: Fall Risk(low)  Pertinent Medical History:      Medical/Treatment Diagnosis Information:  · Diagnosis: Ischial Bursitis of Right Side M70.71  · Treatment Diagnosis: Ischial Bursitis, Muscle Imbalance of Core and B LEs    Insurance/Certification information:  PT Insurance Information: Medicare  Physician Information:  Referring Practitioner: Rosaline Gibbs MD  Plan of care signed (Y/N): Cosign requested    Visit# / total visits:   Pain level: 110 at rest, 2-310 with activity     Functional Outcomes Measure:  Test: Lower Extremity Functional Scale  Score:36 = 55% Disability    Progress Note: []  Yes  []  No  Next due by: Visit #10      History of Injury:Patient reports a gradual onset of R ischial pain since 2020. \"If I drive, it gets sore. Right now, it's a light ache. \"  Patient reports \"a little sensitive\" with walking \"it feels like I put a bean bag in my pccket and sat on it. Pain increases with increased sitting. It's when it's long >/=5-10 minutes, the ache increases. Pain reduces after standing about 10 minutes. Patient has a very big landscape garden, has some discomfort. Negotiating steps, or ladder does increase pain, but \"not as much as sitting\". Laying down feels much better,  but \"it gets uncomfortable if I lay in one position too long </= 20 minutes. Bending forward to stretch legs is very painful. Subjective:  Patient reports he was able to drive about 93-13 minutes before feeling discomfort.     Objective:     Observation: mod tightness and tenderness R ischial tuberosity, proximal hamstrings   Test measurements:      Exercises:  Exercise/Equipment Resistance/Repetitions Other comments   Nustep Level 1 x 6 minutes Seat, UEs #8   Leg Extension 11 pounds, 3 x 10 reps R/L Seat, Tibia #2   Leg Curl 11 pounds, 3 x 10 reps R/L Seat, Tibia, #2   Leg Press 60 pounds, 3 x 10 reps Seat #3             Calf Stretch 3 x 30 sec Incline board                                              Other Therapeutic Activities:  8/11/20:  Discussed at length anatomy and biomechanics of the hamstrings, muscle reeducation, motor recruitment. Home Exercise Program:  8/11/20:   Patient instructed in glut sets, quad sets, isometric hipadd, quad sets, SLR, sitting in chair hamstring stretch ; written instructions with pictures issued, patient able to demonstrate exercises  8/25/20:  Reviewed HEP  8/27/20:   Patient instructed in bent leg fall out, sidelying clamshell with pillow ; written instructions with pictures issued, patient able to demonstrate exercises. 8/31/20:   Patient instructed in longsitting and prone hip IR/ER ; written instructions with pictures issued, patient able to demonstrate exercises. 9/3/20:   Patient instructed in prone and standing hip extension ; written instructions with pictures issued, patient able to demonstrate exercises. Manual Treatments: soft tissue mob to R hamstrings, ischial tub, x 15 minutes    Modalities:     Progression Towards Functional goals:  [x] Patient is progressing as expected towards functional goals listed. [] Progression is slowed due to complexities listed. [] Progression has been slowed due to co-morbidities. [] Plan just implemented, too soon to assess goals progression  [] Other:    Charges: Therapeutic Exercise:  [x] (99140) Provided verbal/tactile cueing for activities to restore or maintain strength, flexibility, endurance, ROM for improvements with self-care, mobility, lifting and ambulation. Neuromuscular Re-Education  [] (89943) Provided verbal/tactile cueing for activities to restore or maintain balance, coordination, kinesthetic sense, posture, motor skill, proprioception for self-care, mobility, lifting, and ambulation. Therapeutic Activities:    [] (18474) Provided verbal/tactile cueing to address functional limitations related to loss of mobility, strength, balance, and coordination. Gait Training:  [] (16147) Provided training and instruction to the patient for proper postural muscle recruitment and positioning with ambulation re-education     Home Exercise Program:    [x] (62014) Reviewed/Progressed HEP activities related to strengthening, flexibility, endurance, ROM for functional self-care, mobility, lifting and ambulation   [] (97223) Reviewed/Progressed HEP activities related to improving balance, coordination, kinesthetic sense, posture, motor skill, proprioception for self-care, mobility, lifting, and ambulation      Manual Treatments:  MFR / STM / Oscillations-Mobs:  G-I, II, III, IV / Manipulation / MLD  [x] (78071) Provided manual therapy to mobilize  soft tissue/joints/fluid for the purpose of modulating pain, promoting relaxation, increasing ROM, reducing/eliminating soft tissue swelling/inflammation/restriction, improving soft tissue extensibility and allowing for proper ROM for normal function with self- care, mobility, lifting and ambulation.         Timed Code Treatment Minutes: 45   Total Treatment Minutes: 45     [] EVAL (LOW) 28127   [] EVAL (MOD) 36130   [] EVAL (HIGH) 66301   [] RE-EVAL   [x] TE (63021) x  2   [] Aquatic (76271) x  [] NMR (39549)   x  [] Aquatic Group (07935) x  [x] Manual (56755) x    [] Ultrasound (48994) x  [] TA (12789) x  [] Mech Traction (04689)  [] Ionto (53044)           [] ES (un) (92054):   [] Vasopump (44361) [] Other:      Assessment  [x] Patient tolerated treatment well [] Patient limited by fatigue  [] Patient limited by pain  [] Patient limited by other medical complications  [] Other:     Prognosis: [x] Good [] Fair  [] Poor    Goals:    Short term goals  Time Frame for Short term goals: 4-6 weeks  Short term goal 1: Pain </= 0/10 at rest, 2/10 with activity  Short term goal 2: patient able to sit >/= 45 minutes without increased symptoms  Short term goal 3: Patient able to stand and walk >/= 20 minutes without increased symptoms  Short term goal 4: Patient able to demonstrate Good flexibility of B hamstrings  Short term goal 5: Patient independent with written home exercise program            Patient Requires Follow-up: [x] Yes  [] No    Plan:   [] Continue per plan of care [] Alter current plan (see comments)  [x] Plan of care initiated [] Hold pending MD visit [] Discharge  Note: If patient does not return for scheduled/ recommended follow up visits, this note will serve as a discharge from care along with most recent update on progress. Plan for Next Session:  Monitor response. Initiate standing hip ext stretch for HEP if able. .    Electronically signed by:  Aixa Prado, [address] [facility]

## 2020-09-08 ENCOUNTER — HOSPITAL ENCOUNTER (OUTPATIENT)
Dept: PHYSICAL THERAPY | Age: 75
Setting detail: THERAPIES SERIES
Discharge: HOME | End: 2020-09-08
Payer: MEDICARE

## 2020-09-08 PROCEDURE — 97110 THERAPEUTIC EXERCISES: CPT

## 2020-09-08 PROCEDURE — 97140 MANUAL THERAPY 1/> REGIONS: CPT

## 2020-09-08 NOTE — FLOWSHEET NOTE
ambulation. Therapeutic Activities:    [] (05850) Provided verbal/tactile cueing to address functional limitations related to loss of mobility, strength, balance, and coordination. Gait Training:  [] (80762) Provided training and instruction to the patient for proper postural muscle recruitment and positioning with ambulation re-education     Home Exercise Program:    [x] (06923) Reviewed/Progressed HEP activities related to strengthening, flexibility, endurance, ROM for functional self-care, mobility, lifting and ambulation   [] (70708) Reviewed/Progressed HEP activities related to improving balance, coordination, kinesthetic sense, posture, motor skill, proprioception for self-care, mobility, lifting, and ambulation      Manual Treatments:  MFR / STM / Oscillations-Mobs:  G-I, II, III, IV / Manipulation / MLD  [x] (45047) Provided manual therapy to mobilize  soft tissue/joints/fluid for the purpose of modulating pain, promoting relaxation, increasing ROM, reducing/eliminating soft tissue swelling/inflammation/restriction, improving soft tissue extensibility and allowing for proper ROM for normal function with self- care, mobility, lifting and ambulation.         Timed Code Treatment Minutes: 45   Total Treatment Minutes: 45     [] EVAL (LOW) 12558   [] EVAL (MOD) 37259   [] EVAL (HIGH) 01126   [] RE-EVAL   [x] TE (27452) x  2   [] Aquatic (30557) x  [] NMR (50489)   x  [] Aquatic Group (18275) x  [x] Manual (88155) x    [] Ultrasound (16059) x  [] TA (90927) x  [] Mech Traction (92304)  [] Ionto (20447)           [] ES (un) (27340):   [] Vasopump (95466) [] Other:      Assessment  [x] Patient tolerated treatment well [] Patient limited by fatigue  [] Patient limited by pain  [] Patient limited by other medical complications  [] Other:     Prognosis: [x] Good [] Fair  [] Poor    Goals:    Short term goals  Time Frame for Short term goals: 4-6 weeks  Short term goal 1: Pain </= 0/10 at rest, 2/10 with activity  Short term goal 2: patient able to sit >/= 45 minutes without increased symptoms  Short term goal 3: Patient able to stand and walk >/= 20 minutes without increased symptoms  Short term goal 4: Patient able to demonstrate Good flexibility of B hamstrings  Short term goal 5: Patient independent with written home exercise program            Patient Requires Follow-up: [x] Yes  [] No    Plan:   [] Continue per plan of care [] Alter current plan (see comments)  [x] Plan of care initiated [] Hold pending MD visit [] Discharge  Note: If patient does not return for scheduled/ recommended follow up visits, this note will serve as a discharge from care along with most recent update on progress. Plan for Next Session:  Monitor response. Reassess for possible discharge. .    Electronically signed by:  Lenin Huffman, [address] [facility] [address]

## 2020-09-10 ENCOUNTER — HOSPITAL ENCOUNTER (OUTPATIENT)
Dept: PHYSICAL THERAPY | Age: 75
Setting detail: THERAPIES SERIES
Discharge: HOME | End: 2020-09-10
Payer: MEDICARE

## 2020-09-10 PROCEDURE — 97140 MANUAL THERAPY 1/> REGIONS: CPT

## 2020-09-10 PROCEDURE — 97110 THERAPEUTIC EXERCISES: CPT

## 2020-09-10 NOTE — PROGRESS NOTES
Outpatient Physical Therapy  [] [facility]    Phone: [phone]   Fax: [phone]   [] [facility]  Phone: [phone]   Fax: [phone]  [x] [facility]er              Phone: [phone]   Fax: [phone]     Physical Therapy Discharge Note  Date: 9/10/2020        Patient Name:  Chad Valle    :    MRN: [id number]  Restrictions/Precautions:  Restrictions/Precautions  Restrictions/Precautions: Fall Risk(low)  Pertinent Medical History:       Medical/Treatment Diagnosis Information:  · Diagnosis: Ischial Bursitis of Right Side M70.71  · Treatment Diagnosis: Ischial Bursitis, Muscle Imbalance of Core and B LEs     Insurance/Certification information:  PT Insurance Information: Medicare  Physician Information:  Referring Practitioner: Belle Francois MD  Plan of care signed (Y/N):   Cosign requested     Visit# / total visits:   Pain level:      0/10 at rest, 2-3/10 with activity               Functional Outcomes Measure:  Test: Lower Extremity Functional Scale  Score:36 = 55% Disability  (20)             50 = 37.5% Disability (9/10/20)     Time Period for Report:  20 thru 9/10/20  Cancels/No-shows to date:   None    Plan of Care/Treatment to date:  [x] Therapeutic Exercise    [] Modalities:  [x] Therapeutic Activity     [] Ultrasound  [] Electrical Stimulation  [x] Gait Training      [] Cervical Traction    [] Lumbar Traction  [] Neuromuscular Re-education  [] Cold/hotpack [] Iontophoresis  [x] Instruction in HEP      Other:  [x] Manual Therapy       []    [] Aquatic Therapy       []                          Significant Findings At Last Visit/Comments:    Subjective:  Patient reports he has felt pretty good, been able to be on ladders and do steps, with min discomfort noted.     Objective:    · Observation: min tightness and tenderness R ischial tuberosity, proximal hamstrings  · Test measurements:    Assessment:   Summary: Patient has been seen for 8 PT visits with significant progress noted. Patient is independent with written home exercise program.    Progression Towards Functional goals:  [] Patient is progressing as expected towards functional goals listed. [] Progression is slowed due to complexities listed. [] Progression has been slowed due to co-morbidities. [] Plan just implemented, too soon to assess goals progression  [x] Other: PT goals have been met    Goals:  Short term goals  Time Frame for Short term goals: 4-6 weeks  Short term goal 1: Pain </= 0/10 at rest, 2/10 with activity Met  Short term goal 2: patient able to sit >/= 45 minutes without increased symptoms Met  Short term goal 3: Patient able to stand and walk >/= 20 minutes without increased symptoms Met  Short term goal 4: Patient able to demonstrate Good flexibility of B hamstrings Met  Short term goal 5: Patient independent with written home exercise program Met         Rehab Potential: [] Excellent [x] Good [] Fair  [] Poor     Goal Status:  [x] Achieved [] Partially Achieved  [] Not Achieved     Current Frequency/Duration: 1-3 times per week for 4-6 weeks    Patient Status:     [x] Patient now discharged      Electronically signed by:  Tereso Wilson, PT [id number]    If you have any questions or concerns, please don't hesitate to call.   Thank you for your referral.    Physician Signature:________________________________Date:__________________  By signing above, therapists plan is approved by physician

## 2020-09-10 NOTE — FLOWSHEET NOTE
Nustep Level 1 x 6 minutes Seat, UEs #8   Leg Extension 11 pounds, 3 x 10 reps R/L Seat, Tibia #2   Leg Curl 11 pounds, 3 x 10 reps R/L Seat, Tibia, #2   Leg Press 80 pounds, 3 x 10 reps Seat #3             Calf Stretch 3 x 30 sec Incline board                                              Other Therapeutic Activities:  8/11/20:  Discussed at length anatomy and biomechanics of the hamstrings, muscle reeducation, motor recruitment. Home Exercise Program:  8/11/20:   Patient instructed in glut sets, quad sets, isometric hipadd, quad sets, SLR, sitting in chair hamstring stretch ; written instructions with pictures issued, patient able to demonstrate exercises  8/25/20:  Reviewed HEP  8/27/20:   Patient instructed in bent leg fall out, sidelying clamshell with pillow ; written instructions with pictures issued, patient able to demonstrate exercises. 8/31/20:   Patient instructed in longsitting and prone hip IR/ER ; written instructions with pictures issued, patient able to demonstrate exercises. 9/3/20:   Patient instructed in prone and standing hip extension ; written instructions with pictures issued, patient able to demonstrate exercises. Manual Treatments: aggressive soft tissue mob to R hamstrings, ischial tub, x 20 minutes    Modalities:     Progression Towards Functional goals:  [x] Patient is progressing as expected towards functional goals listed. [] Progression is slowed due to complexities listed. [] Progression has been slowed due to co-morbidities. [] Plan just implemented, too soon to assess goals progression  [] Other:    Charges: Therapeutic Exercise:  [x] (03230) Provided verbal/tactile cueing for activities to restore or maintain strength, flexibility, endurance, ROM for improvements with self-care, mobility, lifting and ambulation.     Neuromuscular Re-Education  [] (46082) Provided verbal/tactile cueing for activities to restore or maintain balance, coordination, kinesthetic sense, posture, motor skill, proprioception for self-care, mobility, lifting, and ambulation. Therapeutic Activities:    [] (47888) Provided verbal/tactile cueing to address functional limitations related to loss of mobility, strength, balance, and coordination. Gait Training:  [] (32356) Provided training and instruction to the patient for proper postural muscle recruitment and positioning with ambulation re-education     Home Exercise Program:    [x] (85964) Reviewed/Progressed HEP activities related to strengthening, flexibility, endurance, ROM for functional self-care, mobility, lifting and ambulation   [] (55186) Reviewed/Progressed HEP activities related to improving balance, coordination, kinesthetic sense, posture, motor skill, proprioception for self-care, mobility, lifting, and ambulation      Manual Treatments:  MFR / STM / Oscillations-Mobs:  G-I, II, III, IV / Manipulation / MLD  [x] (72694) Provided manual therapy to mobilize  soft tissue/joints/fluid for the purpose of modulating pain, promoting relaxation, increasing ROM, reducing/eliminating soft tissue swelling/inflammation/restriction, improving soft tissue extensibility and allowing for proper ROM for normal function with self- care, mobility, lifting and ambulation.         Timed Code Treatment Minutes: 45   Total Treatment Minutes: 45     [] EVAL (LOW) 94738   [] EVAL (MOD) 90720   [] EVAL (HIGH) 91438   [] RE-EVAL   [x] TE (42476) x  2   [] Aquatic (88772) x  [] NMR (34111)   x  [] Aquatic Group (66118) x  [x] Manual (73131) x    [] Ultrasound (09588) x  [] TA (60518) x  [] Mech Traction (07257)  [] Ionto (93170)           [] ES (un) (27330):   [] Vasopump (46224) [] Other:      Assessment  [x] Patient tolerated treatment well [] Patient limited by fatigue  [] Patient limited by pain  [] Patient limited by other medical complications  [] Other:     Prognosis: [x] Good [] Fair  [] Poor    Goals:    Short term goals  Time Frame for Short term goals: 4-6 weeks  Short term goal 1: Pain </= 0/10 at rest, 2/10 with activity Met  Short term goal 2: patient able to sit >/= 45 minutes without increased symptoms Met  Short term goal 3: Patient able to stand and walk >/= 20 minutes without increased symptoms Met  Short term goal 4: Patient able to demonstrate Good flexibility of B hamstrings Met  Short term goal 5: Patient independent with written home exercise program Met           Patient Requires Follow-up: [x] Yes  [] No    Plan:   [] Continue per plan of care [] Alter current plan (see comments)  [] Plan of care initiated [] Hold pending MD visit [x] Discharge  Note: If patient does not return for scheduled/ recommended follow up visits, this note will serve as a discharge from care along with most recent update on progress.     Plan for Next Session:  Patient discharged to independent home exercise program.    Electronically signed by:  María Vick, 09 [facility]

## 2020-09-15 ASSESSMENT — ENCOUNTER SYMPTOMS
WHEEZING: 0
SHORTNESS OF BREATH: 0
NAUSEA: 0
COUGH: 0
EYE REDNESS: 0
BLOOD IN STOOL: 0

## 2020-09-15 NOTE — PROGRESS NOTES
is normal and behavior is normal.     Body mass index is 28.83 kg/m². Wt Readings from Last 3 Encounters:   09/28/20 178 lb 9.6 oz (81 kg)   08/04/20 175 lb (79.4 kg)   07/14/20 176 lb 9.6 oz (80.1 kg)     BP Readings from Last 3 Encounters:   09/28/20 118/72   07/14/20 128/80   03/12/20 118/78        Current Outpatient Medications   Medication Sig Dispense Refill    diclofenac (VOLTAREN) 50 MG EC tablet Take 1 tablet by mouth 2 times daily (with meals) 30 tablet 0    pravastatin (PRAVACHOL) 40 MG tablet TAKE ONE TABLET BY MOUTH EVERY EVENING 90 tablet 1    aspirin 325 MG EC tablet Take 1 tablet by mouth daily 30 tablet 3    metoprolol succinate (TOPROL XL) 50 MG extended release tablet TAKE 1 TABLET BY MOUTH EVERY DAY 90 tablet 3    clotrimazole (LOTRIMIN) 1 % cream Apply topically 2 times daily Apply topically 2 times daily.  Acetaminophen 650 MG TABS Take by mouth 2 times daily       diclofenac sodium 1 % GEL Apply 2 g topically as needed.  Glucosamine-Chondroitin-MSM (TRIPLE FLEX PO) Take by mouth 3 times daily       Terbinafine HCl (LAMISIL EX) Apply  topically.  Multiple Vitamins-Minerals (AMDHU MULTIVITAMIN FOR MEN PO) Take  by mouth. For memory takes 1 tablet      SAW PALMETTO Take by mouth 2 times daily        No current facility-administered medications for this visit. Social History     Socioeconomic History    Marital status:      Spouse name: None    Number of children: None    Years of education: None    Highest education level: None   Occupational History    Occupation: retired   Social Needs    Financial resource strain: None    Food insecurity     Worry: None     Inability: None    Transportation needs     Medical: None     Non-medical: None   Tobacco Use    Smoking status: Never Smoker    Smokeless tobacco: Never Used   Substance and Sexual Activity    Alcohol use:  Yes     Alcohol/week: 0.0 standard drinks     Comment: minimal caffeine occasional direction and personally reviewed by me in its entirety. I confirm that the note above accurately reflects all work, treatment, procedures, and medical decision making performed by me.

## 2020-09-28 ENCOUNTER — OFFICE VISIT (OUTPATIENT)
Dept: CARDIOLOGY CLINIC | Age: 75
End: 2020-09-28
Payer: MEDICARE

## 2020-09-28 VITALS
SYSTOLIC BLOOD PRESSURE: 118 MMHG | HEART RATE: 90 BPM | OXYGEN SATURATION: 98 % | TEMPERATURE: 97.3 F | WEIGHT: 178.6 LBS | BODY MASS INDEX: 28.7 KG/M2 | DIASTOLIC BLOOD PRESSURE: 72 MMHG | HEIGHT: 66 IN

## 2020-09-28 PROCEDURE — 1123F ACP DISCUSS/DSCN MKR DOCD: CPT | Performed by: INTERNAL MEDICINE

## 2020-09-28 PROCEDURE — 99214 OFFICE O/P EST MOD 30 MIN: CPT | Performed by: INTERNAL MEDICINE

## 2020-09-28 PROCEDURE — 1036F TOBACCO NON-USER: CPT | Performed by: INTERNAL MEDICINE

## 2020-09-28 PROCEDURE — G8417 CALC BMI ABV UP PARAM F/U: HCPCS | Performed by: INTERNAL MEDICINE

## 2020-09-28 PROCEDURE — 4040F PNEUMOC VAC/ADMIN/RCVD: CPT | Performed by: INTERNAL MEDICINE

## 2020-09-28 PROCEDURE — G8427 DOCREV CUR MEDS BY ELIG CLIN: HCPCS | Performed by: INTERNAL MEDICINE

## 2020-09-28 PROCEDURE — 3017F COLORECTAL CA SCREEN DOC REV: CPT | Performed by: INTERNAL MEDICINE

## 2020-09-28 NOTE — PATIENT INSTRUCTIONS
Patient Education        Atrial Flutter: Care Instructions  Your Care Instructions     Atrial flutter is a type of heartbeat problem (arrhythmia) that usually causes a fast heart rate. In atrial flutter, a problem with the heart's electrical system causes the two upper parts of the heart (the right atrium and the left atrium) to flutter, or beat very fast. Atrial flutter might be diagnosed using an an electrocardiogram (EKG). An EKG translates the heart's electrical activity into line tracings on paper. Treating atrial flutter is important for several reasons. The change in heartbeat can cause blood clots. The clots can travel from your heart to your brain and cause a stroke. A fast heartbeat can make you feel lightheaded, dizzy, and weak. And over time, it can also increase your risk for heart failure. Atrial flutter is often the result of another heart condition, such as coronary artery disease or some other heart rhythm problems. Making changes to improve your heart health will help you stay healthy and active. Your doctor may prescribe medicines to help slow down your heartbeat. You may also take medicine to help prevent a stroke. In some cases, a procedure called catheter ablation is done to stop atrial flutter. Follow-up care is a key part of your treatment and safety. Be sure to make and go to all appointments, and call your doctor if you are having problems. It's also a good idea to know your test results and keep a list of the medicines you take. How can you care for yourself at home? Medicines  · Take your medicines exactly as prescribed. Call your doctor if you think you are having a problem with your medicine. You will get more details on the specific medicines your doctor prescribes. · If your doctor has given you a blood thinner to prevent a stroke, be sure you get instructions about how to take your medicine safely. Blood thinners can cause serious bleeding problems.   · Do not take any feel dizzy or lightheaded, or you feel like you may faint. · Your heart rate becomes irregular. · You can feel your heart flutter in your chest or skip heartbeats. Tell your doctor if these symptoms are new or worse. Watch closely for changes in your health, and be sure to contact your doctor if you have any problems. Where can you learn more? Go to https://IschemixpeLegalGuru.Sokolin. org and sign in to your [employer] account. Enter O198 in the AudioCompass box to learn more about \"Atrial Flutter: Care Instructions. \"     If you do not have an account, please click on the \"Sign Up Now\" link. Current as of: December 16, 2019               Content Version: 12.5  © 4215-1377 Healthwise, Incorporated. Care instructions adapted under license by [facility] ([facility]). If you have questions about a medical condition or this instruction, always ask your healthcare professional. [place] [address] any warranty or liability for your use of this information.

## 2020-09-28 NOTE — LETTER
[address] Cardiology - [address] [place] [address] [address]  [address] [place]  Phone: [phone]  Fax: [phone]    Jayshree Lazo MD        October 14, 2020     Sabrina Gomes MD  [address]    Patient: Blaise Hihg  MR Number: <[id number]>  YOB: 1945  Date of Visit: 9/28/2020    Dear Dr. Sabrina Gomes:    Thank you for your referral. Progress note attached in visit summary. If you have questions, please do not hesitate to call me. I look forward to following Taylor Saul along with you.     Sincerely,        Jayshree Lazo MD

## 2020-11-29 ENCOUNTER — HOSPITAL ENCOUNTER (EMERGENCY)
Age: 75
Discharge: HOME | End: 2020-11-29
Attending: EMERGENCY MEDICINE
Payer: MEDICARE

## 2020-11-29 ENCOUNTER — APPOINTMENT (OUTPATIENT)
Dept: GENERAL RADIOLOGY | Age: 75
End: 2020-11-29
Payer: MEDICARE

## 2020-11-29 VITALS
OXYGEN SATURATION: 99 % | BODY MASS INDEX: 29.02 KG/M2 | WEIGHT: 180.56 LBS | TEMPERATURE: 96.3 F | RESPIRATION RATE: 16 BRPM | HEIGHT: 66 IN | DIASTOLIC BLOOD PRESSURE: 92 MMHG | SYSTOLIC BLOOD PRESSURE: 161 MMHG | HEART RATE: 72 BPM

## 2020-11-29 LAB
A/G RATIO: 1.9 (ref 1.1–2.2)
ALBUMIN SERPL-MCNC: 4.5 G/DL (ref 3.4–5)
ALP BLD-CCNC: 66 U/L (ref 40–129)
ALT SERPL-CCNC: 19 U/L (ref 10–40)
ANION GAP SERPL CALCULATED.3IONS-SCNC: 8 MMOL/L (ref 3–16)
AST SERPL-CCNC: 25 U/L (ref 15–37)
BASOPHILS ABSOLUTE: 0 K/UL (ref 0–0.2)
BASOPHILS RELATIVE PERCENT: 0.6 %
BILIRUB SERPL-MCNC: 0.7 MG/DL (ref 0–1)
BUN BLDV-MCNC: 15 MG/DL (ref 7–20)
CALCIUM SERPL-MCNC: 9.5 MG/DL (ref 8.3–10.6)
CHLORIDE BLD-SCNC: 107 MMOL/L (ref 99–110)
CO2: 26 MMOL/L (ref 21–32)
CREAT SERPL-MCNC: 1 MG/DL (ref 0.8–1.3)
D DIMER: <0.1 UG/ML FEU
EKG ATRIAL RATE: 357 BPM
EKG DIAGNOSIS: NORMAL
EKG Q-T INTERVAL: 396 MS
EKG QRS DURATION: 78 MS
EKG QTC CALCULATION (BAZETT): 433 MS
EKG R AXIS: 13 DEGREES
EKG T AXIS: 36 DEGREES
EKG VENTRICULAR RATE: 72 BPM
EOSINOPHILS ABSOLUTE: 0.1 K/UL (ref 0–0.6)
EOSINOPHILS RELATIVE PERCENT: 2.5 %
GFR AFRICAN AMERICAN: >60
GFR NON-AFRICAN AMERICAN: >60
GLOBULIN: 2.4 G/DL
GLUCOSE BLD-MCNC: 99 MG/DL (ref 70–99)
HCT VFR BLD CALC: 46 % (ref 40.5–52.5)
HEMOGLOBIN: 15.2 G/DL (ref 13.5–17.5)
LIPASE: 52 U/L (ref 13–60)
LYMPHOCYTES ABSOLUTE: 1.6 K/UL (ref 1–5.1)
LYMPHOCYTES RELATIVE PERCENT: 32.7 %
MCH RBC QN AUTO: 31 PG (ref 26–34)
MCHC RBC AUTO-ENTMCNC: 33.1 G/DL (ref 31–36)
MCV RBC AUTO: 93.5 FL (ref 80–100)
MONOCYTES ABSOLUTE: 0.5 K/UL (ref 0–1.3)
MONOCYTES RELATIVE PERCENT: 10.9 %
NEUTROPHILS ABSOLUTE: 2.8 K/UL (ref 1.7–7.7)
NEUTROPHILS RELATIVE PERCENT: 53.3 %
PDW BLD-RTO: 12.8 % (ref 12.4–15.4)
PLATELET # BLD: 191 K/UL (ref 135–450)
PMV BLD AUTO: 8 FL (ref 5–10.5)
POTASSIUM SERPL-SCNC: 5.1 MMOL/L (ref 3.5–5.1)
RBC # BLD: 4.92 M/UL (ref 4.2–5.9)
SODIUM BLD-SCNC: 141 MMOL/L (ref 136–145)
TOTAL PROTEIN: 6.9 G/DL (ref 6.4–8.2)
TROPONIN: <0.01 NG/ML
WBC # BLD: 5 K/UL (ref 4–11)

## 2020-11-29 PROCEDURE — 85025 COMPLETE CBC W/AUTO DIFF WBC: CPT

## 2020-11-29 PROCEDURE — 71046 X-RAY EXAM CHEST 2 VIEWS: CPT

## 2020-11-29 PROCEDURE — 83690 ASSAY OF LIPASE: CPT

## 2020-11-29 PROCEDURE — 36415 COLL VENOUS BLD VENIPUNCTURE: CPT

## 2020-11-29 PROCEDURE — 99283 EMERGENCY DEPT VISIT LOW MDM: CPT

## 2020-11-29 PROCEDURE — 80053 COMPREHEN METABOLIC PANEL: CPT

## 2020-11-29 PROCEDURE — 85379 FIBRIN DEGRADATION QUANT: CPT

## 2020-11-29 PROCEDURE — 93010 ELECTROCARDIOGRAM REPORT: CPT | Performed by: INTERNAL MEDICINE

## 2020-11-29 PROCEDURE — 84484 ASSAY OF TROPONIN QUANT: CPT

## 2020-11-29 PROCEDURE — 93005 ELECTROCARDIOGRAM TRACING: CPT | Performed by: EMERGENCY MEDICINE

## 2020-11-29 RX ORDER — PREDNISONE 20 MG/1
TABLET ORAL
Qty: 18 TABLET | Refills: 0 | Status: SHIPPED | OUTPATIENT
Start: 2020-11-29 | End: 2020-12-09

## 2020-11-29 ASSESSMENT — PAIN SCALES - GENERAL
PAINLEVEL_OUTOF10: 4
PAINLEVEL_OUTOF10: 4

## 2020-11-29 ASSESSMENT — PAIN DESCRIPTION - DESCRIPTORS: DESCRIPTORS: ACHING

## 2020-11-29 ASSESSMENT — PAIN DESCRIPTION - ORIENTATION: ORIENTATION: RIGHT

## 2020-11-29 ASSESSMENT — PAIN DESCRIPTION - PAIN TYPE: TYPE: ACUTE PAIN

## 2020-11-29 ASSESSMENT — PAIN DESCRIPTION - LOCATION: LOCATION: ARM

## 2020-11-29 NOTE — ED PROVIDER NOTES
[facility]  eMERGENCY dEPARTMENT eNCOUnter      Pt Name: Noé Baxter  MRN: [id number]  Dentongfmary 1945  Date of evaluation: 11/29/2020  Provider: Ace Gould MD    [address] [place] [address]       Chief Complaint   Patient presents with    Arm Pain     right arm pit pain radiates down right arm to wrist and up into right side of chest and neck x3 days, no injury         CRITICAL CARE TIME   Total Critical Care time was 0 inutes, excluding separately reportable procedures. There was a high probability of clinically significant/life threatening deterioration in the patient's condition which required my urgent intervention. HISTORY OF PRESENT ILLNESS  (Location/Symptom, Timing/Onset, Context/Setting, Quality, Duration, Modifying Factors, Severity.)   Noé Baxter is a 70 y.o. male who presents to the emergency department with right arm pain that starts at his axilla radiates down his arm to wrist, right chest and back. Started 3 days ago. Does not remember injuring the arm. States that the pain feels like an achy pressure that is constant. Nothing seems to make it better or worse. Denies taking additional pain medication for this pain. States putting a heating pad on back and it kind of helped. States feeling nauseated at times but has not vomited. Denies palpitations, shortness of breath, dizziness, lightheadedness, blurred vision, or tingling/numbness in extremities. Nursing Notes were reviewed and I agree. REVIEW OF SYSTEMS    (2-9 systems for level 4, 10 or more for level 5)     General: Denies fever or chills. HEENT: Denies sore throat, congestion, or drainage. Lungs: Denies cough, shortness of breath, or trouble breathing. Heart: Denies palpitations, or racing heart beat. Abdomen; States feeling nauseated at times. Denies diarrhea, vomiting, or pain. Musculoskeletal; States right arm pain as above. Denies calf pain, or swelling of extremities.   Skin: Denies rash or skin discoloration. Neuro: Denies numbness, tingling, or weakness. Except as noted above the remainder of the review of systems was reviewed and negative. PAST MEDICAL HISTORY     Past Medical History:   Diagnosis Date    Atrial fibrillation [facility])     new onset    Kidney stone     Mixed hyperlipidemia     managed by PCP    Near syncope     Palpitations     Prostate enlargement     Syncope and collapse          SURGICAL HISTORY       Past Surgical History:   Procedure Laterality Date    COLONOSCOPY  9/26/2001    negative, deak, also neg sigmoid 2007    COLONOSCOPY  2/15/2011    negative dr Mickey Houston       Discharge Medication List as of 11/29/2020 11:26 AM      CONTINUE these medications which have NOT CHANGED    Details   pravastatin (PRAVACHOL) 40 MG tablet TAKE ONE TABLET BY MOUTH EVERY EVENING, Disp-90 tablet,R-1Normal      aspirin 325 MG EC tablet Take 1 tablet by mouth daily, Disp-30 tablet, R-3NO PRINT      metoprolol succinate (TOPROL XL) 50 MG extended release tablet TAKE 1 TABLET BY MOUTH EVERY DAY, Disp-90 tablet, R-3Pt doesn't need script yetNormal      Acetaminophen 650 MG TABS Take by mouth 2 times daily       diclofenac sodium 1 % GEL Apply 2 g topically as needed., Topical, PRN, Until Discontinued, Historical Med      Glucosamine-Chondroitin-MSM (TRIPLE FLEX PO) Take by mouth 3 times daily       Multiple Vitamins-Minerals (MADHU MULTIVITAMIN FOR MEN PO) Take  by mouth. For memory takes 1 tablet      SAW PALMETTO Take by mouth 2 times daily       clotrimazole (LOTRIMIN) 1 % cream Apply topically 2 times daily Apply topically 2 times daily. , Topical, 2 TIMES DAILY, Historical Med      Terbinafine HCl (LAMISIL EX) Apply  topically. ALLERGIES     Patient has no known allergies.     FAMILY HISTORY       Family History   Problem Relation Age of Onset    Rheum Arthritis Brother     Heart Disease Brother     Heart Disease Mother         older age   Zannie Cowden Dementia Father           SOCIAL HISTORY       Social History     Socioeconomic History    Marital status:      Spouse name: None    Number of children: None    Years of education: None    Highest education level: None   Occupational History    Occupation: retired   Social Needs    Financial resource strain: None    Food insecurity     Worry: None     Inability: None    Transportation needs     Medical: None     Non-medical: None   Tobacco Use    Smoking status: Never Smoker    Smokeless tobacco: Never Used   Substance and Sexual Activity    Alcohol use: Not Currently     Alcohol/week: 0.0 standard drinks     Comment: minimal caffeine occasional wine    Drug use: No    Sexual activity: None   Lifestyle    Physical activity     Days per week: None     Minutes per session: None    Stress: None   Relationships    Social connections     Talks on phone: None     Gets together: None     Attends Alevism service: None     Active member of club or organization: None     Attends meetings of clubs or organizations: None     Relationship status: None    Intimate partner violence     Fear of current or ex partner: None     Emotionally abused: None     Physically abused: None     Forced sexual activity: None   Other Topics Concern    None   Social History Narrative    None         PHYSICAL EXAM    (up to 7 for level 4, 8 or more for level 5)     ED Triage Vitals [11/29/20 1007]   BP Temp Temp Source Pulse Resp SpO2 Height Weight   (!) 152/89 96.3 °F (35.7 °C) Oral 82 16 98 % 5' 5.5\" (1.664 m) 180 lb 8.9 oz (81.9 kg)       General: Alert thin white male in no acute distress. Head: Atraumatic and normocephalic. Eyes: Pupils equal, round, and reactive. No pallor. ENT: TMs normal. Nose is clear. Oropharynx moist, without erythema  Neck: Supple without adenopathy. Cervical spine nontender. No meningismus. Heart: Regular rate and rhythm. No murmurs or gallops.   Lungs: Breath sounds equal bilaterally and clear. Abdomen: Soft, nondistended, nontender. No masses organomegaly. Musculoskeletal: Right mid and upper thoracic paraspinous muscle tenderness extending into the right trapezius muscle. Full range of motion of the upper and lower extremities, no muscle atrophy, symmetrical strength, intact symmetrical distal pulses. 1+ symmetrical triceps, biceps, and brachial radialis reflexes. No chest wall tenderness. Neuro: Awake, alert, and oriented. Symmetrical reactive pupils. Intact extraocular movements. Normal gait. Symmetrical motor function as above. Deep tendon reflexes as above. Mental Status: Normal affect      DIFFERENTIAL DIAGNOSIS     Differentials include but are not limited to MI, angina, aortic dissection, pulmonary embolism, cervical radiculopathy, shoulder strain, bursitis, bicipital tendinitis, thoracic strain    DIAGNOSTIC RESULTS     EKG: All EKG's are interpreted by Antionette Shin MD in the absence of a cardiologist.    Atrial fibrillation, rate of 72, no acute ST-T wave changes. Rhythm strip shows atrial fibrillation with a rate of 72, QRS 78 ms with no other ectopy as interpreted by me. No significant change compared to EKG dated 1/30/2020. RADIOLOGY:   Non-plain film images such as CT, Ultrasound and MRI are read by the radiologist. Plain radiographic images are visualized and preliminarily interpreted Antionette Shin MD with the below findings:      Interpretation per the Radiologist below, if available at the time of this note:    XR CHEST (2 VW)   Final Result   No evidence of acute cardiopulmonary disease.                ED BEDSIDE ULTRASOUND:   Performed by ED Physician - none    LABS:  Labs Reviewed   LIPASE    Narrative:     Performed at:  [facility]) - [place] [id number] [facility]   Phone [phone]   D-DIMER, QUANTITATIVE    Narrative:     Performed at:  [address] [place] [address] [facility]  [address] [facility]   Phone [phone]   CBC WITH AUTO DIFFERENTIAL    Narrative:     Performed at:  [address]  [address] [facility]   Phone [phone]   COMPREHENSIVE METABOLIC PANEL    Narrative:     Performed at:  [address]  [address] [facility]  [facility]   Phone [phone]   TROPONIN    Narrative:     Performed at:  [address]  [address] [facility]  [facility]   Phone [phone]       All other labs were within normal range or not returned as of this dictation. EMERGENCY DEPARTMENT COURSE and DIFFERENTIAL DIAGNOSIS/MDM:   Vitals:    Vitals:    11/29/20 1007 11/29/20 1121   BP: (!) 152/89 (!) 161/92   Pulse: 82 72   Resp: 16 16   Temp: 96.3 °F (35.7 °C)    TempSrc: Oral    SpO2: 98% 99%   Weight: 180 lb 8.9 oz (81.9 kg)    Height: 5' 5.5\" (1.664 m)        This patient presents with some pain in his right arm which extends into his upper back and neck area. He has no neurologic deficits. He has no numbness tingling or paresthesias. He has no evidence of rash/shingles on exam.  He has no acute EKG changes, his troponin is normal.  I have a low index of suspicion for cardiac etiology. His chest x-ray shows no acute cardiopulmonary abnormality. His D-dimer is not elevated. He is not tachycardic or hypoxic. I have a low index of suspicion for pulmonary embolus. Clinically I think his pain is musculoskeletal in origin, I cannot rule out the possibility of a cervical radiculopathy. Of note on his chest x-ray, specifically as lateral view he has significant degenerative changes in his thoracic spine. I am going to treat him with a short course of prednisone with close follow-up with his PCP.   He understands that if his symptoms do not improve/resolve he may need further work-up including but not limited to an MRI. He understands if he develops any new symptoms of concern he should return for reevaluation. Test results, diagnosis, and treatment plan were discussed with the patient. He understands the treatment plan and follow-up as discussed. CONSULTS:  None    PROCEDURES:  None    FINAL IMPRESSION      1.  Cervical radiculopathy          DISPOSITION/PLAN   DISPOSITION        PATIENT REFERRED TO:  Dariana Nunez MD  [facility]  [phone]    In 3 days  If symptoms worsen      DISCHARGE MEDICATIONS:  Discharge Medication List as of 11/29/2020 11:26 AM      START taking these medications    Details   predniSONE (DELTASONE) 20 MG tablet 3 tabs po qam for 3 days then 2 tabs qam for 3 days the 1 tab qam for 3 days, Disp-18 tablet,R-0Print             (Please note that portions of this note were completed with a voice recognition program.  Efforts were made to edit the dictations but occasionally words are mis-transcribed.)    Alejandro Vogel MD  Attending Emergency Physician        Joselo Hurtado MD  11/29/20 0415

## 2020-12-07 ENCOUNTER — HOSPITAL ENCOUNTER (OUTPATIENT)
Age: 75
Discharge: HOME | End: 2020-12-07
Payer: MEDICARE

## 2020-12-07 ENCOUNTER — HOSPITAL ENCOUNTER (OUTPATIENT)
Dept: GENERAL RADIOLOGY | Age: 75
Discharge: HOME | End: 2020-12-07
Payer: MEDICARE

## 2020-12-07 ENCOUNTER — OFFICE VISIT (OUTPATIENT)
Dept: FAMILY MEDICINE CLINIC | Age: 75
End: 2020-12-07
Payer: MEDICARE

## 2020-12-07 VITALS
HEIGHT: 66 IN | TEMPERATURE: 97 F | BODY MASS INDEX: 28.28 KG/M2 | WEIGHT: 176 LBS | SYSTOLIC BLOOD PRESSURE: 124 MMHG | DIASTOLIC BLOOD PRESSURE: 82 MMHG

## 2020-12-07 PROCEDURE — 3017F COLORECTAL CA SCREEN DOC REV: CPT | Performed by: FAMILY MEDICINE

## 2020-12-07 PROCEDURE — 72050 X-RAY EXAM NECK SPINE 4/5VWS: CPT

## 2020-12-07 PROCEDURE — G8484 FLU IMMUNIZE NO ADMIN: HCPCS | Performed by: FAMILY MEDICINE

## 2020-12-07 PROCEDURE — 1123F ACP DISCUSS/DSCN MKR DOCD: CPT | Performed by: FAMILY MEDICINE

## 2020-12-07 PROCEDURE — G8427 DOCREV CUR MEDS BY ELIG CLIN: HCPCS | Performed by: FAMILY MEDICINE

## 2020-12-07 PROCEDURE — 99213 OFFICE O/P EST LOW 20 MIN: CPT | Performed by: FAMILY MEDICINE

## 2020-12-07 PROCEDURE — 1036F TOBACCO NON-USER: CPT | Performed by: FAMILY MEDICINE

## 2020-12-07 PROCEDURE — 4040F PNEUMOC VAC/ADMIN/RCVD: CPT | Performed by: FAMILY MEDICINE

## 2020-12-07 PROCEDURE — G8417 CALC BMI ABV UP PARAM F/U: HCPCS | Performed by: FAMILY MEDICINE

## 2020-12-07 NOTE — PROGRESS NOTES
Subjective:      Patient ID: Alan Martin is a 70 y.o. male. Patient presents with: Follow-Up from Hospital: [facility]- \"Cervical radiculopathy\"    He has neck pain on the right and down the right arm. ~ 2 weeks  Went to ER for this and all ok  meds some help with this   He points to about c 7 or so  No injury      YOB: 1945    Date of Visit:  12/7/2020    No Known Allergies    Current Outpatient Medications:  predniSONE (DELTASONE) 20 MG tablet, 3 tabs po qam for 3 days then 2 tabs qam for 3 days the 1 tab qam for 3 days, Disp: 18 tablet, Rfl: 0  pravastatin (PRAVACHOL) 40 MG tablet, TAKE ONE TABLET BY MOUTH EVERY EVENING, Disp: 90 tablet, Rfl: 1  aspirin 325 MG EC tablet, Take 1 tablet by mouth daily, Disp: 30 tablet, Rfl: 3  metoprolol succinate (TOPROL XL) 50 MG extended release tablet, TAKE 1 TABLET BY MOUTH EVERY DAY, Disp: 90 tablet, Rfl: 3  clotrimazole (LOTRIMIN) 1 % cream, Apply topically 2 times daily Apply topically 2 times daily. , Disp: , Rfl:   Acetaminophen 650 MG TABS, Take by mouth 2 times daily , Disp: , Rfl:   diclofenac sodium 1 % GEL, Apply 2 g topically as needed. , Disp: , Rfl:   Glucosamine-Chondroitin-MSM (TRIPLE FLEX PO), Take by mouth 3 times daily , Disp: , Rfl:   Terbinafine HCl (LAMISIL EX), Apply  topically. , Disp: , Rfl:   Multiple Vitamins-Minerals (MADHU MULTIVITAMIN FOR MEN PO), Take  by mouth.  For memory takes 1 tablet, Disp: , Rfl:   SAW PALMETTO, Take by mouth 2 times daily , Disp: , Rfl:     No current facility-administered medications for this visit.       ---------------------------               12/07/20                      1529         ---------------------------   BP:          124/82         Site:    Left Upper Arm      Position:     Sitting        Cuff Size:  Medium Adult      Temp:    97 °F (36.1 °C)    TempSrc:    Temporal        Weight: 176 lb (79.8 kg)    Height: 5' 5.5\" (1.664

## 2020-12-09 ENCOUNTER — TELEPHONE (OUTPATIENT)
Dept: FAMILY MEDICINE CLINIC | Age: 75
End: 2020-12-09

## 2020-12-11 ENCOUNTER — HOSPITAL ENCOUNTER (OUTPATIENT)
Dept: MRI IMAGING | Age: 75
Discharge: HOME | End: 2020-12-11
Payer: MEDICARE

## 2020-12-11 PROCEDURE — 72141 MRI NECK SPINE W/O DYE: CPT

## 2020-12-14 ENCOUNTER — TELEPHONE (OUTPATIENT)
Dept: FAMILY MEDICINE CLINIC | Age: 75
End: 2020-12-14

## 2020-12-14 ENCOUNTER — TELEPHONE (OUTPATIENT)
Dept: ORTHOPEDIC SURGERY | Age: 75
End: 2020-12-14

## 2020-12-14 RX ORDER — MELOXICAM 7.5 MG/1
7.5 TABLET ORAL DAILY
Qty: 30 TABLET | Refills: 1 | Status: SHIPPED | OUTPATIENT
Start: 2020-12-14 | End: 2021-01-28 | Stop reason: ALTCHOICE

## 2020-12-14 NOTE — TELEPHONE ENCOUNTER
Yes according to package instructions    Orders Placed This Encounter   Medications    meloxicam (MOBIC) 7.5 MG tablet     Sig: Take 1 tablet by mouth daily     Dispense:  30 tablet     Refill:  1

## 2020-12-14 NOTE — TELEPHONE ENCOUNTER
Pt was referred to Dr. Cherie Robbins and pt called them and pt cant get an apt til next week on Wednesday. Pt is now calling to see if he could be prescribed a pain med for his issue?     Pl advise [phone] (home)

## 2020-12-14 NOTE — TELEPHONE ENCOUNTER
I will place him on meloxicam  Do not use with the diclofenac gel ( on the skin) or with otc aleve advil ibuprofen etc

## 2020-12-23 ENCOUNTER — OFFICE VISIT (OUTPATIENT)
Dept: ORTHOPEDIC SURGERY | Age: 75
End: 2020-12-23
Payer: MEDICARE

## 2020-12-23 ENCOUNTER — TELEPHONE (OUTPATIENT)
Dept: ORTHOPEDIC SURGERY | Age: 75
End: 2020-12-23

## 2020-12-23 VITALS — BODY MASS INDEX: 28.28 KG/M2 | WEIGHT: 176 LBS | HEIGHT: 66 IN

## 2020-12-23 PROCEDURE — G8427 DOCREV CUR MEDS BY ELIG CLIN: HCPCS | Performed by: PHYSICIAN ASSISTANT

## 2020-12-23 PROCEDURE — G8484 FLU IMMUNIZE NO ADMIN: HCPCS | Performed by: PHYSICIAN ASSISTANT

## 2020-12-23 PROCEDURE — G8417 CALC BMI ABV UP PARAM F/U: HCPCS | Performed by: PHYSICIAN ASSISTANT

## 2020-12-23 PROCEDURE — 99214 OFFICE O/P EST MOD 30 MIN: CPT | Performed by: PHYSICIAN ASSISTANT

## 2020-12-23 NOTE — PROGRESS NOTES
· RIGHT UPPER EXTREMITY:  Inspection/examination of the right upper extremity does not show any tenderness, deformity or injury. Range of motion is unremarkable. There is no gross instability. There are no rashes, ulcerations or lesions. Strength and tone are normal.  · LEFT UPPER EXTREMITY: Inspection/examination of the left upper extremity does not show any tenderness, deformity or injury. Range of motion is unremarkable. There is no gross instability. There are no rashes, ulcerations or lesions. Strength and tone are normal.    Diagnostic Testing:  MRI: 12/11/2020    Impression   1. Moderate to severe spinal canal stenosis at C5-6 secondary to a left   paracentral posterior disc osteophyte complex.  This flattens the ventral   surface of the spinal cord.  Abnormal increased T2 signal intensity within   the cervical spinal cord at this level is suggestive of spondylotic   myelopathy.  Moderate left and mild right neural foraminal narrowing is   present as well. 2. Mild spinal canal stenosis, severe left and mild right neural foraminal   narrowing at C4-5, as described above. 3. Mild spinal canal stenosis, severe left and moderate right neural   foraminal narrowing at C3-4, as described above. 4. Additional multilevel degenerative changes, as described above. The findings were sent to the Radiology Results [address] at 10:01   am on 12/11/2020to be communicated to a licensed caregiver. 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 He may call if he would like a prescription for gabapentin. · Follow Up: Follow up PRN. Old records were reviewed. Patient examined and note dictated by Flavio Dee PA-C. Patient also seen and examined by Dr. Halley Sarmiento.

## 2021-01-05 ENCOUNTER — HOSPITAL ENCOUNTER (OUTPATIENT)
Dept: PHYSICAL THERAPY | Age: 76
Setting detail: THERAPIES SERIES
Discharge: HOME | End: 2021-01-05
Payer: MEDICARE

## 2021-01-05 PROCEDURE — 97162 PT EVAL MOD COMPLEX 30 MIN: CPT

## 2021-01-05 PROCEDURE — 97110 THERAPEUTIC EXERCISES: CPT

## 2021-01-05 PROCEDURE — 97530 THERAPEUTIC ACTIVITIES: CPT

## 2021-01-05 NOTE — FLOWSHEET NOTE
[facility]  Outpatient Rehabilitation and Therapy, [place] [address]  Phone: [phone]   Fax:     [phone]    Physical Therapy Treatment Note/ Progress Report:     Date:  2021    Patient Name:  Hawa Del Toro    :  [birth year]  MRN: [id number]    Pertinent Medical History:      Medical/Treatment Diagnosis Information:  · Diagnosis: DDD, cervical M50.30; cervical stenosis of spinal canal M48.02  · Treatment Diagnosis: Neck pain    Insurance/Certification information:  PT Insurance Information: Medicare  Physician Information:  Referring Practitioner: Jami Deng PA-C  Plan of care signed (Y/N): Cosign requested     Date of Patient follow up with Physician: prn      Progress Report: []  Yes  [x]  No     Date Range for reporting period:  Beginnin2021  Ending:     Progress report due (10 Rx/or 30 days whichever is less):     Recertification due (POC duration/ or 90 days whichever is less):      Visit # Insurance/POC Allowable Auth Needed   1 12 []Yes    [x]No     Functional Outcomes Measure:   Date Assessed: 21  Test: Neck Disability Index  Score:8 = 16%    Pain level:  5-7/10 without pain History of Injury:Patient reports a history of neck pain since mid-December. Patient says the pain got worse and worse, got into R arm pit. \"I thought I was having a heart attack, went to ED. He referred me to Dr. Jack Hoff, and I was referred to orthopedics. \"  Patient underwent an MRI which showed DDD and stenosis of cervical spine. Patient was given a home c-traction collar to use at home. \"With the medicine it has been better. If I don't take the medicine, it gets worse over the next 2 days. \"  Patient is no longer having the pain in the R UE, pain is primarilly in posterior aspect of neck. Patient reports \"I do have trouble turning my head a lot\". Patient does feel some discomfort with washing hair, etc, Patient is not exercising at present because of neck. SUBJECTIVE:  See eval    OBJECTIVE:   ? Observation:   ? Test measurements:      RESTRICTIONS/PRECAUTIONS:     Exercises/Interventions:   Therapeutic Ex (39539)  Min:15 Resistance/Repetitions Notes   HEP                                        Manual Intervention ([id number])  Min:                                   NMR re-education (91772)  Min:               Therapeutic Activity (67554)  Min:10     Patient education          Modalities  Min:                  Other Therapeutic Activities:  Pt was educated on PT POC, Diagnosis, Prognosis, pathomechanics as well as frequency and duration of scheduling future physical therapy appointments. Time was also taken on this day to answer all patient questions and participation in PT. Reviewed appointment policy in detail with patient and patient verbalized understanding. Discussed at length anatomy and biomechanics of the neck, muscle reeducation, motor recruitment. Home Exercise Program:  Patient instructed in chin tucks, lower trap sets, wall slide withstep flex, scaption; written instructions with pictures issued, patient able to demonstrate exercises.     Therapeutic Exercise and NMR EXR [x] (49294) Provided verbal/tactile cueing for activities related to strengthening, flexibility, endurance, ROM  for improvements in cervical, postural, scapular, scapulothoracic and UE control with self care, reaching, carrying, lifting, house/yardwork, driving/computer work.    [] (00701) Provided verbal/tactile cueing for activities related to improving balance, coordination, kinesthetic sense, posture, motor skill, proprioception  to assist with cervical, scapular, scapulothoracic and UE control with self care, reaching, carrying, lifting, house/yardwork, driving/computer work. Therapeutic Activities:    [x] (62503 or 57691) Provided verbal/tactile cueing for activities related to improving balance, coordination, kinesthetic sense, posture, motor skill, proprioception and motor activation to allow for proper function of cervical, scapular, scapulothoracic and UE control with self care, carrying, lifting, driving/computer work.      Home Exercise Program:    [x] (69362) Reviewed/Progressed HEP activities related to strengthening, flexibility, endurance, ROM of cervical, scapular, scapulothoracic and UE control with self care, reaching, carrying, lifting, house/yardwork, driving/computer work  [] (72627) Reviewed/Progressed HEP activities related to improving balance, coordination, kinesthetic sense, posture, motor skill, proprioception of cervical, scapular, scapulothoracic and UE control with self care, reaching, carrying, lifting, house/yardwork, driving/computer work      Manual Treatments:  PROM / STM / Oscillations-Mobs:  G-I, II, III, IV (PA's, Inf., Post.) [] (91706) Provided manual therapy to mobilize soft tissue/joints of cervical/CT, scapular GHJ and UE for the purpose of decreasing headache, modulating pain, promoting relaxation,  increasing ROM, reducing/eliminating soft tissue swelling/inflammation/restriction, improving soft tissue extensibility and allowing for proper ROM for normal function with self care, reaching, carrying, lifting, house/yardwork, driving/computer work      Charges:  Timed Code Treatment Minutes: 25   Total Treatment Minutes: 45     [] EVAL (LOW) 98781 (typically 20 minutes face-to-face)  [x] EVAL (MOD) 26198 (typically 30 minutes face-to-face)  [] EVAL (HIGH) 39648 (typically 45 minutes face-to-face)  [] RE-EVAL     [x] UG(98544) x     [] Dry needle 1 or 2 Muscles (73845)  [] NMR (02444) x     [] Dry needle 3+ Muscles (91053)  [] Manual (77101) x     [] Ultrasound (84021) x  [x] TA (12804) x     [] Mech Traction (92822)  [] ES(attended) (84717)     [] ES (un) (42894):   [] Vasopump (01177) [] Ionto (98422)   [] Other:    GOALS:  Patient stated goal: \"pain relief  []? Progressing: []? Met: []? Not Met: []? Adjusted     Therapist goals for Patient:   Short Term Goals: To be achieved in: 2-4weeks  1. Independent in HEP and progression per patient tolerance, in order to prevent re-injury. []? Progressing: []? Met: []? Not Met: []? Adjusted  2. Patient will have a decrease in pain to facilitate improvement in movement, function, and ADLs as indicated by Functional Deficits. []? Progressing: []? Met: []? Not Met: []? Adjusted     Long Term Goals: To be achieved in: 4-6 weeks  1. Disability index score of 10% or less for the NDI to assist with reaching prior level of function. []? Progressing: []? Met: []? Not Met: []? Adjusted  2. Patient will demonstrate increased AROM to [facility] of cervical/thoracic spine to allow for proper joint functioning as indicated by patients Functional Deficits. []? Progressing: []? Met: []? Not Met: []?  Adjusted 3. Patient will demonstrate an increase in postural awareness and control and activation of  Deep cervical stabilizers to allow for proper functional mobility as indicated by patients Functional Deficits. []? Progressing: []? Met: []? Not Met: []? Adjusted  4. Patient will return to functional activities without increased symptoms or restriction. []? Progressing: []? Met: []? Not Met: []? Adjusted  5. Patient able to resume workout routine without increased symptoms   []? Progressing: []? Met: []? Not Met: []? Adjusted            ASSESSMENT:  See eval    Treatment/Activity Tolerance:  [x] Patient tolerated treatment well [] Patient limited by fatique  [] Patient limited by pain  [] Patient limited by other medical complications  [] Other:     Overall Progression Towards Functional goals/ Treatment Progress Update:  [] Patient is progressing as expected towards functional goals listed. [] Progression is slowed due to complexities/Impairments listed. [] Progression has been slowed due to co-morbidities. [x] Plan just implemented, too soon to assess goals progression <30days   [] Goals require adjustment due to lack of progress  [] Patient is not progressing as expected and requires additional follow up with physician  [] Other    Prognosis for POC: [x] Good [] Fair  [] Poor    Patient requires continued skilled intervention: [x] Yes  [] No        PLAN: Initiate UE ranger, theraslide row, lat pull down, ext soft tissue mob to B C-region  [] Continue per plan of care [] Alter current plan (see comments)  [x] Plan of care initiated [] Hold pending MD visit [] Discharge    Electronically signed by: Rani Martinez, PT [id number]    Note: If patient does not return for scheduled/recommended follow up visits, this note will serve as a discharge from care along with the most recent update on progress.

## 2021-01-05 NOTE — PROGRESS NOTES
[facility]  Outpatient Rehabilitation and Therapy, [place] [address]  Phone: [phone]   Fax:     [phone]        Physical Therapy Certification    Dear Referring Practitioner: Otto Alvarez PA-C,    We had the pleasure of evaluating the following patient for physical therapy services at [facility]. A summary of our findings can be found in the initial assessment below. This includes our plan of care. If you have any questions or concerns regarding these findings, please do not hesitate to contact me at the office phone number checked above.   Thank you for the referral.       Physician Signature:_______________________________Date:__________________  By signing above (or electronic signature), therapists plan is approved by physician        Patient: Vicky Ramirez   : 1945   MRN: [id number]  Referring Physician: Referring Practitioner: Otto Alvarez PA-C      Evaluation Date: 2021      Medical Diagnosis Information:  Diagnosis: DDD, cervical M50.30; cervical stenosis of spinal canal M48.02   Treatment Diagnosis: Neck pain                                         Insurance information: PT Insurance Information: Medicare    Precautions/ Contra-indications:   Latex Allergy:  [x]NO      []YES  Preferred Language for Healthcare:   [x]English       []other:    C-SSRS Triggered by Intake questionnaire (Past 2 wk assessment ):   [x] No, Questionnaire did not trigger screening.   [] Yes, Patient intake triggered C-SSRS Screening      [] C-SSRS Screening completed  [] PCP notified via Epic SUBJECTIVE: Patient reports a history of neck pain since mid-December. Patient says the pain got worse and worse, got into R arm pit. \"I thought I was having a heart attack, went to ED. He referred me to Dr. Meagan Taylor, and I was referred to orthopedics. \"  Patient underwent an MRI which showed DDD and stenosis of cervical spine. Patient was given a home c-traction collar to use at home. \"With the medicine it has been better. If I don't take the medicine, it gets worse over the next 2 days. \"  Patient is no longer having the pain in the R UE, pain is primarilly in posterior aspect of neck. Patient reports \"I do have trouble turning my head a lot\". Patient does feel some discomfort with washing hair, etc, Patient is not exercising at present because of neck. Relevant Medical History: A fib, hyperlipidemia; syncope  Functional Disability Index: Neck Disability Index:  8 = 16% disability 1/5/21  Pain Scale:5-7/10 without medicine  Easing factors: medicine keeps it away  Provocative factors: missing medication;      Type: [x]Constant   []Intermittent  []Radiating []Localized []other:     Numbness/Tingling: not at this time    Occupation/School: retired; avid     Living Status/Prior Level of Function: Independent with ADLs and IADLs,     OBJECTIVE:   Posture: fair+    Functional Mobility/Transfers: independent    Palpation: tenderness noted B lev scap, upper traps, scalenes,     Gait: (include devices/WB status):  WNL     CERV ROM     Cervical Flexion WNL, pull    Cervical Extension WNL compression     Left Right   Cervical SB Decreased 50% Decreased 50%   Cervical rotation Decreased 20% Decreased 20%   Quadrant WNL WNL   Dorsal Glide neg neg    UE ROM Left Right   Shoulder Flex 5/5 5/5   Shoulder Abd 5/5 5/5   Shoulder ER 5/5 5/5   Shoulder IR 5/5 5/5   Elbow flex/ext 5/5 5/5   Wrist flex/ext/pro/sup 5/5 5/5   Finger flex/ext/opposition 5/5 5/5   Shoulder AROM WNL w OP 5/5 5/5   UE Strength  Left Right Shoulder Flex 5/5 5/5   Shoulder Scap 5/5 5/5   Shoulder ABd (C5 Axillary) 5/5 5/5   Shoulder ER  5/5 5/5   Shoulder IR 5/5 5/5   Elbow Flex (C5 Musc) 5/5 5/5   Elbow Ext (C7 Radial) 5/5 5/5   Wrist Flex (C6 Radial) 5/5 5/5   Wrist Ext (C7 Radial) 5/5 5/5   Finger flex (C8 median) 5/5 5/5   Finger ext (C7 Radial-PIN) 5/5 5/5   APB (T1 Median) 5/5 5/5   Finger Abd (T1 Ulnar)     UE myotomes WNL        Sensation:    [x]Dermatomes/Myotomes intact        Joint mobility:   []Normal    [x]Hypo   []Hyper                       [x] Patient history, allergies, meds reviewed. Medical chart reviewed. See intake form. Review Of Systems (ROS):  [x]Performed Review of systems (Integumentary, CardioPulmonary, Neurological) by intake and observation. Intake form has been scanned into medical record. Patient has been instructed to contact their primary care physician regarding ROS issues if not already being addressed at this time.       Co-morbidities/Complexities (which will affect course of rehabilitation):   []None           Arthritic conditions   [x]Rheumatoid arthritis (M05.9)  [x]Osteoarthritis (M19.91)   Cardiovascular conditions   [x]Hypertension (I10)  []Hyperlipidemia (E78.5)  []Angina pectoris (I20)  []Atherosclerosis (I70)  []CVA Musculoskeletal conditions   [x]Disc pathology   []Congenital spine pathologies   []Prior surgical intervention  []Osteoporosis (M81.8)  []Osteopenia (M85.8)   Endocrine conditions   []Hypothyroid (E03.9)  []Hyperthyroid Gastrointestinal conditions   []Constipation (E30.97)   Metabolic conditions   []Morbid obesity (E66.01)  []Diabetes type 1(E10.65) or 2 (E11.65)   []Neuropathy (G60.9)     Pulmonary conditions   []Asthma (J45)  []Coughing   []COPD (J44.9)   Psychological Disorders  []Anxiety (F41.9)  []Depression (F32.9)   []Other:   []Other:          Barriers to/and or personal factors that will affect rehab potential:              []Age  []Sex   []Smoker []Reduced participation in social activities. [x]Reduced participation in sport/recreational activities. Classification/Subgrouping:   [x]signs/symptoms consistent with neck pain with mobility deficits     []signs/symptoms consistent with neck pain with movement coordinated impairments    []signs/symptoms consistent with neck pain with radiating pain    []signs/symptoms consistent with neck pain with headaches (cervicogenic)    []Signs/symptoms consistent with nerve root involvement including myotome & dermatome dysfunction   []sign/symptoms consistent with facet dysfunction of cervical and thoracic spine    []signs/symptoms consistent suggesting central cord compression/UMN syndromes   []signs/symptoms consistent with discogenic cervical pain   []signs/symptoms consistent with rib dysfunction   []signs/symptoms consistent with postural dysfunction   []signs/symptoms consistent with shoulder pathology    []signs/symptoms consistent with post-surgical status including decreased ROM, strength and function.    []signs/symptoms consistent with pathology which may benefit from Dry Needling   []signs/symptoms which may limit the use of advanced manual therapy techniques: (Elevated CV risk profile, recent trauma, intolerance to end range positions, prior TIA, visual issues, UE neurological compromise )     Prognosis/Rehab Potential:      []Excellent   [x]Good    []Fair   []Poor    Tolerance of evaluation/treatment:    []Excellent   [x]Good    []Fair   []Poor    Physical Therapy Evaluation Complexity Justification  [x] A history of present problem with:  [] no personal factors and/or comorbidities that impact the plan of care;  [x]1-2 personal factors and/or comorbidities that impact the plan of care  []3 personal factors and/or comorbidities that impact the plan of care [x] An examination of body systems using standardized tests and measures addressing any of the following: body structures and functions (impairments), activity limitations, and/or participation restrictions;:  [] a total of 1-2 or more elements   [x] a total of 3 or more elements   [] a total of 4 or more elements   [x] A clinical presentation with:  [] stable and/or uncomplicated characteristics   [x] evolving clinical presentation with changing characteristics  [] unstable and unpredictable characteristics;   [x] Clinical decision making of [] low, [] moderate, [] high complexity using standardized patient assessment instrument and/or measurable assessment of functional outcome. [] EVAL (LOW) 58283 (typically 20 minutes face-to-face)  [x] EVAL (MOD) 98053 (typically 30 minutes face-to-face)  [] EVAL (HIGH) 88047 (typically 45 minutes face-to-face)  [] RE-EVAL       PLAN:   Frequency/Duration:  1-3 days per week for 1-3 Weeks:  Interventions:  [x]  Therapeutic exercise including: strength training, ROM, for cervical spine,scapula, core and Upper extremity, including postural re-education. [x]  NMR activation and proprioception for Deep cervical flexors, periscapular and RC muscles and Core, including postural re-education. [x]  Manual therapy as indicated for C/T spine, ribs, Soft tissue to include: Dry Needling/IASTM, STM, PROM, Gr I-IV mobilizations, manipulation. [x] Modalities as needed that may include: thermal agents, E-stim, Biofeedback, US, iontophoresis as indicated  [x] Patient education on joint protection, postural re-education, activity modification, progression of HEP. HEP instruction:   Patient instructed in chin tucks, lower trap sets, wall slide withstep flex, scaption; written instructions with pictures issued, patient able to demonstrate exercises.     GOALS:  Patient stated goal: \"pain relief  [] Progressing: [] Met: [] Not Met: [] Adjusted    Therapist goals for Patient: Short Term Goals: To be achieved in: 2-4weeks  1. Independent in HEP and progression per patient tolerance, in order to prevent re-injury. [] Progressing: [] Met: [] Not Met: [] Adjusted  2. Patient will have a decrease in pain to facilitate improvement in movement, function, and ADLs as indicated by Functional Deficits. [] Progressing: [] Met: [] Not Met: [] Adjusted    Long Term Goals: To be achieved in: 4-6 weeks  1. Disability index score of 10% or less for the NDI to assist with reaching prior level of function. [] Progressing: [] Met: [] Not Met: [] Adjusted  2. Patient will demonstrate increased AROM to [facility] of cervical/thoracic spine to allow for proper joint functioning as indicated by patients Functional Deficits. [] Progressing: [] Met: [] Not Met: [] Adjusted  3. Patient will demonstrate an increase in postural awareness and control and activation of  Deep cervical stabilizers to allow for proper functional mobility as indicated by patients Functional Deficits. [] Progressing: [] Met: [] Not Met: [] Adjusted  4. Patient will return to functional activities without increased symptoms or restriction. [] Progressing: [] Met: [] Not Met: [] Adjusted  5. Patient able to resume workout routine without increased symptoms   [] Progressing: [] Met: [] Not Met: [] Adjusted       Electronically signed by:  Zina Watts, PT [id number]      Note: If patient does not return for scheduled/recommended follow up visits, this note will serve as a discharge from care along with the most recent update on progress.

## 2021-01-08 ENCOUNTER — HOSPITAL ENCOUNTER (OUTPATIENT)
Dept: PHYSICAL THERAPY | Age: 76
Setting detail: THERAPIES SERIES
Discharge: HOME | End: 2021-01-08
Payer: MEDICARE

## 2021-01-08 PROCEDURE — 97110 THERAPEUTIC EXERCISES: CPT

## 2021-01-08 PROCEDURE — 97140 MANUAL THERAPY 1/> REGIONS: CPT

## 2021-01-08 NOTE — FLOWSHEET NOTE
[facility], [facility] [address] [place] [address]  Phone: [phone]   Fax:     [phone]    Physical Therapy Treatment Note/ Progress Report:     Date:  2021    Patient Name:  Dieter Tirado    :  [age]  MRN: [id number]    Pertinent Medical History:      Medical/Treatment Diagnosis Information:  · Diagnosis: DDD, cervical M50.30; cervical stenosis of spinal canal M48.02  · Treatment Diagnosis: Neck pain    Insurance/Certification information:  PT Insurance Information: Medicare  Physician Information:  Referring Practitioner: Yaw Fraser PA-C  Plan of care signed (Y/N): Britany requested     Date of Patient follow up with Physician: prn      Progress Report: []  Yes  [x]  No     Date Range for reporting period:  Beginnin2021  Ending:     Progress report due (10 Rx/or 30 days whichever is less):     Recertification due (POC duration/ or 90 days whichever is less):      Visit # Insurance/POC Allowable Auth Needed   2 12 []Yes    [x]No     Functional Outcomes Measure:   Date Assessed: 21  Test: Neck Disability Index  Score:8 = 16%    Pain level:  5-7/10 without pain     History of Injury:Patient reports a history of neck pain since mid-December. Patient says the pain got worse and worse, got into R arm pit. \"I thought I was having a heart attack, went to ED. He referred me to Dr. Meagan Taylor, and I was referred to orthopedics. \"  Patient underwent an MRI which showed DDD and stenosis of cervical spine. Patient was given a home c-traction collar to use at home. \"With the medicine it has been better. If I don't take the medicine, it gets worse over the next 2 days. \"  Patient is no longer having the pain in the R UE, pain is primarilly in posterior aspect of neck. Patient reports \"I do have trouble turning my head a lot\".   Patient does feel some discomfort with washing hair, etc, Patient is not exercising at present because of neck. SUBJECTIVE:  See eval  1/8/21: felt good after eval. Did not need to take medication at night last night and he is still feeling pretty good    OBJECTIVE:    Observation:    Test measurements:      RESTRICTIONS/PRECAUTIONS:     Exercises/Interventions:   Therapeutic Ex (16100)  Min:15 Resistance/Repetitions Notes        UE [place] flexion 10x R/L    Tslide  Rows  Green 1 x 10  Ext  Green  1 x 10  Lat Pulls  Green 1 x 10                             Manual Intervention (32786)  Min: 25          STM to B CPVM 25 min                        NMR re-education (12119)  Min:               Therapeutic Activity (33454)  Min:10     Patient education          Modalities  Min:                  Other Therapeutic Activities:  Pt was educated on PT POC, Diagnosis, Prognosis, pathomechanics as well as frequency and duration of scheduling future physical therapy appointments. Time was also taken on this day to answer all patient questions and participation in PT. Reviewed appointment policy in detail with patient and patient verbalized understanding. Discussed at length anatomy and biomechanics of the neck, muscle reeducation, motor recruitment. Home Exercise Program:  Patient instructed in chin tucks, lower trap sets, wall slide withstep flex, scaption; written instructions with pictures issued, patient able to demonstrate exercises.     Therapeutic Exercise and NMR EXR  [x] (97681) Provided verbal/tactile cueing for activities related to strengthening, flexibility, endurance, ROM  for improvements in cervical, postural, scapular, scapulothoracic and UE control with self care, reaching, carrying, lifting, house/yardwork, driving/computer work.    [] (06099) Provided verbal/tactile cueing for activities related to improving balance, coordination, kinesthetic sense, posture, motor skill, proprioception  to assist with cervical, scapular, scapulothoracic and UE control with self care, reaching, carrying, lifting, house/yardwork, driving/computer work. Therapeutic Activities:    [x] (62321 or 77334) Provided verbal/tactile cueing for activities related to improving balance, coordination, kinesthetic sense, posture, motor skill, proprioception and motor activation to allow for proper function of cervical, scapular, scapulothoracic and UE control with self care, carrying, lifting, driving/computer work.      Home Exercise Program:    [x] (79194) Reviewed/Progressed HEP activities related to strengthening, flexibility, endurance, ROM of cervical, scapular, scapulothoracic and UE control with self care, reaching, carrying, lifting, house/yardwork, driving/computer work  [] (01527) Reviewed/Progressed HEP activities related to improving balance, coordination, kinesthetic sense, posture, motor skill, proprioception of cervical, scapular, scapulothoracic and UE control with self care, reaching, carrying, lifting, house/yardwork, driving/computer work      Manual Treatments:  PROM / STM / Oscillations-Mobs:  G-I, II, III, IV (PA's, Inf., Post.)  [] (74686) Provided manual therapy to mobilize soft tissue/joints of cervical/CT, scapular GHJ and UE for the purpose of decreasing headache, modulating pain, promoting relaxation,  increasing ROM, reducing/eliminating soft tissue swelling/inflammation/restriction, improving soft tissue extensibility and allowing for proper ROM for normal function with self care, reaching, carrying, lifting, house/yardwork, driving/computer work      Charges:  Timed Code Treatment Minutes: 40   Total Treatment Minutes: 45     [] EVAL (LOW) 80600 (typically 20 minutes face-to-face)  [] EVAL (MOD) 61162 (typically 30 minutes face-to-face)  [] EVAL (HIGH) 38279 (typically 45 minutes face-to-face)  [] RE-EVAL     [x] VI(88677) x     [] Dry needle 1 or 2 Muscles (18180)  [] NMR (50412) x     [] Dry needle 3+ Muscles (87342)  [x] Manual (71552) x     [] Ultrasound (68522) x  [] TA (29061) x     [] [facility] Traction (10050)  [] ES(attended) (53511)     [] ES (un) (32144):   [] Vasopump (20175) [] Ionto (12090)   [] Other:    GOALS:  Patient stated goal: \"pain relief  []? Progressing: []? Met: []? Not Met: []? Adjusted     Therapist goals for Patient:   Short Term Goals: To be achieved in: 2-4weeks  1. Independent in HEP and progression per patient tolerance, in order to prevent re-injury. []? Progressing: []? Met: []? Not Met: []? Adjusted  2. Patient will have a decrease in pain to facilitate improvement in movement, function, and ADLs as indicated by Functional Deficits. []? Progressing: []? Met: []? Not Met: []? Adjusted     Long Term Goals: To be achieved in: 4-6 weeks  1. Disability index score of 10% or less for the NDI to assist with reaching prior level of function. []? Progressing: []? Met: []? Not Met: []? Adjusted  2. Patient will demonstrate increased AROM to [facility] of cervical/thoracic spine to allow for proper joint functioning as indicated by patients Functional Deficits. []? Progressing: []? Met: []? Not Met: []? Adjusted  3. Patient will demonstrate an increase in postural awareness and control and activation of  Deep cervical stabilizers to allow for proper functional mobility as indicated by patients Functional Deficits. []? Progressing: []? Met: []? Not Met: []? Adjusted  4. Patient will return to functional activities without increased symptoms or restriction. []? Progressing: []? Met: []? Not Met: []? Adjusted  5. Patient able to resume workout routine without increased symptoms   []? Progressing: []? Met: []? Not Met: []?  Adjusted            ASSESSMENT:  See eval    Treatment/Activity Tolerance:  [x] Patient tolerated treatment well [] Patient limited by fatique  [] Patient limited by pain  [] Patient limited by other medical complications  [] Other:     Overall Progression Towards Functional goals/ Treatment Progress Update:  [] Patient is progressing as expected towards functional goals listed. [] Progression is slowed due to complexities/Impairments listed. [] Progression has been slowed due to co-morbidities. [x] Plan just implemented, too soon to assess goals progression <30days   [] Goals require adjustment due to lack of progress  [] Patient is not progressing as expected and requires additional follow up with physician  [] Other    Prognosis for POC: [x] Good [] Fair  [] Poor    Patient requires continued skilled intervention: [x] Yes  [] No        PLAN: soft tissue mob to B C-region  [x] Continue per plan of care [] Alter current plan (see comments)  [] Plan of care initiated [] Hold pending MD visit [] Discharge    Electronically signed by: Ulices Parham PTA   Note: If patient does not return for scheduled/recommended follow up visits, this note will serve as a discharge from care along with the most recent update on progress.

## 2021-01-11 RX ORDER — METOPROLOL SUCCINATE 50 MG/1
TABLET, EXTENDED RELEASE ORAL
Qty: 90 TABLET | Refills: 3 | Status: SHIPPED | OUTPATIENT
Start: 2021-01-11 | End: 2022-01-20 | Stop reason: SDUPTHER

## 2021-01-12 ENCOUNTER — HOSPITAL ENCOUNTER (OUTPATIENT)
Dept: PHYSICAL THERAPY | Age: 76
Setting detail: THERAPIES SERIES
Discharge: HOME | End: 2021-01-12
Payer: MEDICARE

## 2021-01-12 PROCEDURE — 97140 MANUAL THERAPY 1/> REGIONS: CPT

## 2021-01-12 PROCEDURE — 97110 THERAPEUTIC EXERCISES: CPT

## 2021-01-12 RX ORDER — PRAVASTATIN SODIUM 40 MG
TABLET ORAL
Qty: 90 TABLET | Refills: 1 | Status: SHIPPED | OUTPATIENT
Start: 2021-01-12 | End: 2021-08-16

## 2021-01-12 NOTE — FLOWSHEET NOTE
[facility], [place] [address]. [place] [address]  Phone: [phone]   Fax:     [phone]    Physical Therapy Treatment Note/ Progress Report:     Date:  2021    Patient Name:  Yari Felix    :    MRN: [id number]    Pertinent Medical History:      Medical/Treatment Diagnosis Information:  · Diagnosis: DDD, cervical M50.30; cervical stenosis of spinal canal M48.02  · Treatment Diagnosis: Neck pain    Insurance/Certification information:  PT Insurance Information: Medicare  Physician Information:  Referring Practitioner: Sriram Elizabeth PA-C  Plan of care signed (Y/N): Cosign requested     Date of Patient follow up with Physician: prn      Progress Report: []  Yes  [x]  No     Date Range for reporting period:  Beginnin2021  Ending:     Progress report due (10 Rx/or 30 days whichever is less):     Recertification due (POC duration/ or 90 days whichever is less):      Visit # Insurance/POC Allowable Auth Needed   3 12 []Yes    [x]No     Functional Outcomes Measure:   Date Assessed: 21  Test: Neck Disability Index  Score:8 = 16%    Pain level:  5-7/10 without pain     History of Injury:Patient reports a history of neck pain since mid-December. Patient says the pain got worse and worse, got into R arm pit. \"I thought I was having a heart attack, went to ED. He referred me to Dr. Best Sargent, and I was referred to orthopedics. \"  Patient underwent an MRI which showed DDD and stenosis of cervical spine. Patient was given a home c-traction collar to use at home. \"With the medicine it has been better. If I don't take the medicine, it gets worse over the next 2 days. \"  Patient is no longer having the pain in the R UE, pain is primarilly in posterior aspect of neck. Patient reports \"I do have trouble turning my head a lot\".   Patient does feel some discomfort with washing hair, etc, Patient is not exercising at present because of neck. SUBJECTIVE:  See eval  1/8/21: felt good after eval. Did not need to take medication at night last night and he is still feeling pretty good  1/12/21: Patient has stopped taking medication before bed and woke up this am without pain. Patient is using home traction unit and seems to be helping. OBJECTIVE:    Observation:    Test measurements:      RESTRICTIONS/PRECAUTIONS:     Exercises/Interventions:   Therapeutic Ex (56248)  Min:20 Resistance/Repetitions Notes        UE [place] flexion 10x R/L    Tslide  Rows  Green 1 x 10  Ext  Green  1 x 10  Lat Pulls  Green 1 x 10              Home Ex Program               Manual Intervention (55670)  Min: 25          STM to B CPVM 25 min                        NMR re-education (82072)  Min:               Therapeutic Activity (98384)  Min:10     Patient education          Modalities  Min:                  Other Therapeutic Activities:  Pt was educated on PT POC, Diagnosis, Prognosis, pathomechanics as well as frequency and duration of scheduling future physical therapy appointments. Time was also taken on this day to answer all patient questions and participation in PT. Reviewed appointment policy in detail with patient and patient verbalized understanding. Discussed at length anatomy and biomechanics of the neck, muscle reeducation, motor recruitment. Home Exercise Program:  Patient instructed in chin tucks, lower trap sets, wall slide withstep flex, scaption; written instructions with pictures issued, patient able to demonstrate exercises. 1/12/21:   Patient instructed in supine chin nod, supine UE flossing ; written instructions with pictures issued, patient able to demonstrate exercises.     Therapeutic Exercise and NMR EXR  [x] (03516) Provided verbal/tactile cueing for activities related to strengthening, flexibility, endurance, ROM  for improvements in cervical, postural, scapular, scapulothoracic and UE control with self care, reaching, carrying, lifting, house/yardwork, driving/computer work.    [] (20117) Provided verbal/tactile cueing for activities related to improving balance, coordination, kinesthetic sense, posture, motor skill, proprioception  to assist with cervical, scapular, scapulothoracic and UE control with self care, reaching, carrying, lifting, house/yardwork, driving/computer work. Therapeutic Activities:    [] (25567 or 67425) Provided verbal/tactile cueing for activities related to improving balance, coordination, kinesthetic sense, posture, motor skill, proprioception and motor activation to allow for proper function of cervical, scapular, scapulothoracic and UE control with self care, carrying, lifting, driving/computer work.      Home Exercise Program:    [x] (16554) Reviewed/Progressed HEP activities related to strengthening, flexibility, endurance, ROM of cervical, scapular, scapulothoracic and UE control with self care, reaching, carrying, lifting, house/yardwork, driving/computer work  [] (43055) Reviewed/Progressed HEP activities related to improving balance, coordination, kinesthetic sense, posture, motor skill, proprioception of cervical, scapular, scapulothoracic and UE control with self care, reaching, carrying, lifting, house/yardwork, driving/computer work      Manual Treatments:  PROM / STM / Oscillations-Mobs:  G-I, II, III, IV (PA's, Inf., Post.)  [x] (84182) Provided manual therapy to mobilize soft tissue/joints of cervical/CT, scapular GHJ and UE for the purpose of decreasing headache, modulating pain, promoting relaxation,  increasing ROM, reducing/eliminating soft tissue swelling/inflammation/restriction, improving soft tissue extensibility and allowing for proper ROM for normal function with self care, reaching, carrying, lifting, house/yardwork, driving/computer work      Charges:  Timed Code Treatment Minutes: 45   Total Treatment Minutes: 45     [] ANGY (LOW) [id number] (typically 20 minutes face-to-face)  [] EVAL (MOD) 92010 (typically 30 minutes face-to-face)  [] EVAL (HIGH) 09813 (typically 45 minutes face-to-face)  [] RE-EVAL     [x] UH(92845) x     [] Dry needle 1 or 2 Muscles (05044)  [] NMR (79818) x     [] Dry needle 3+ Muscles (88878)  [x] Manual (48069) x 2    [] Ultrasound (90472) x  [] TA (25057) x     [] Mech Traction (71987)  [] ES(attended) (19737)     [] ES (un) (11554):   [] Vasopump (81306) [] Ionto (87732)   [] Other:    GOALS:  Patient stated goal: \"pain relief  []? Progressing: []? Met: []? Not Met: []? Adjusted     Therapist goals for Patient:   Short Term Goals: To be achieved in: 2-4weeks  1. Independent in HEP and progression per patient tolerance, in order to prevent re-injury. []? Progressing: []? Met: []? Not Met: []? Adjusted  2. Patient will have a decrease in pain to facilitate improvement in movement, function, and ADLs as indicated by Functional Deficits. []? Progressing: []? Met: []? Not Met: []? Adjusted     Long Term Goals: To be achieved in: 4-6 weeks  1. Disability index score of 10% or less for the NDI to assist with reaching prior level of function. []? Progressing: []? Met: []? Not Met: []? Adjusted  2. Patient will demonstrate increased AROM to [facility] of cervical/thoracic spine to allow for proper joint functioning as indicated by patients Functional Deficits. []? Progressing: []? Met: []? Not Met: []? Adjusted  3. Patient will demonstrate an increase in postural awareness and control and activation of  Deep cervical stabilizers to allow for proper functional mobility as indicated by patients Functional Deficits. []? Progressing: []? Met: []? Not Met: []? Adjusted  4. Patient will return to functional activities without increased symptoms or restriction. []? Progressing: []? Met: []? Not Met: []? Adjusted  5. Patient able to resume workout routine without increased symptoms   []? Progressing: []? Met: []? Not Met: []?  Adjusted      ASSESSMENT:  Patient is progressing well. Treatment/Activity Tolerance:  [x] Patient tolerated treatment well [] Patient limited by fatique  [] Patient limited by pain  [] Patient limited by other medical complications  [] Other:     Overall Progression Towards Functional goals/ Treatment Progress Update:  [x] Patient is progressing as expected towards functional goals listed. [] Progression is slowed due to complexities/Impairments listed. [] Progression has been slowed due to co-morbidities. [] Plan just implemented, too soon to assess goals progression <30days   [] Goals require adjustment due to lack of progress  [] Patient is not progressing as expected and requires additional follow up with physician  [] Other    Prognosis for POC: [x] Good [] Fair  [] Poor    Patient requires continued skilled intervention: [x] Yes  [] No        PLAN: Initiate theraband ex for HEP  [x] Continue per plan of care [] Alter current plan (see comments)  [] Plan of care initiated [] Hold pending MD visit [] Discharge    Electronically signed by: Lisa Olivo, PT [id number]  Note: If patient does not return for scheduled/recommended follow up visits, this note will serve as a discharge from care along with the most recent update on progress.

## 2021-01-15 ENCOUNTER — HOSPITAL ENCOUNTER (OUTPATIENT)
Dept: PHYSICAL THERAPY | Age: 76
Setting detail: THERAPIES SERIES
Discharge: HOME | End: 2021-01-15
Payer: MEDICARE

## 2021-01-15 PROCEDURE — 97140 MANUAL THERAPY 1/> REGIONS: CPT

## 2021-01-15 PROCEDURE — 97110 THERAPEUTIC EXERCISES: CPT

## 2021-01-15 NOTE — FLOWSHEET NOTE
[facility]  Outpatient Rehabilitation and Therapy, [address] [place] [address]  Phone: [phone]   Fax:     [phone]    Physical Therapy Treatment Note/ Progress Report:     Date:  1/15/2021    Patient Name:  Leonardo Robbins    :    MRN: [id number]    Pertinent Medical History:      Medical/Treatment Diagnosis Information:  · Diagnosis: DDD, cervical M50.30; cervical stenosis of spinal canal M48.02  · Treatment Diagnosis: Neck pain    Insurance/Certification information:  PT Insurance Information: Medicare  Physician Information:  Referring Practitioner: Greg Pelletier PA-C  Plan of care signed (Y/N): Cosign requested     Date of Patient follow up with Physician: prn      Progress Report: []  Yes  [x]  No     Date Range for reporting period:  Beginnin/15/2021  Ending:     Progress report due (10 Rx/or 30 days whichever is less):     Recertification due (POC duration/ or 90 days whichever is less):      Visit # Insurance/POC Allowable Auth Needed   4 12 []Yes    [x]No     Functional Outcomes Measure:   Date Assessed: 21  Test: Neck Disability Index  Score:8 = 16%    Pain level:  5-7/10 without pain History of Injury:Patient reports a history of neck pain since mid-December. Patient says the pain got worse and worse, got into R arm pit. \"I thought I was having a heart attack, went to ED. He referred me to Dr. Ethan Sawyer, and I was referred to orthopedics. \"  Patient underwent an MRI which showed DDD and stenosis of cervical spine. Patient was given a home c-traction collar to use at home. \"With the medicine it has been better. If I don't take the medicine, it gets worse over the next 2 days. \"  Patient is no longer having the pain in the R UE, pain is primarilly in posterior aspect of neck. Patient reports \"I do have trouble turning my head a lot\". Patient does feel some discomfort with washing hair, etc, Patient is not exercising at present because of neck. SUBJECTIVE:  See eval  1/8/21: felt good after eval. Did not need to take medication at night last night and he is still feeling pretty good  1/12/21: Patient has stopped taking medication before bed and woke up this am without pain. Patient is using home traction unit and seems to be helping. 1/15/21: Doing really well.  Near painless    OBJECTIVE:   ? Observation:   ? Test measurements:      RESTRICTIONS/PRECAUTIONS:     Exercises/Interventions:   Therapeutic Ex (80619)  Min:20 Resistance/Repetitions Notes        UE [place] flexion 10x R/L    Tslide  Rows  Green 1 x 10  Ext  Green  1 x 10  Lat Pulls  Green 1 x 10              Home Ex Program               Manual Intervention (38696)  Min: 25          STM to B CPVM 25 min                        NMR re-education (27644)  Min:               Therapeutic Activity (11806)  Min:10     Patient education          Modalities  Min: Other Therapeutic Activities:  Pt was educated on PT POC, Diagnosis, Prognosis, pathomechanics as well as frequency and duration of scheduling future physical therapy appointments. Time was also taken on this day to answer all patient questions and participation in PT. Reviewed appointment policy in detail with patient and patient verbalized understanding. Discussed at length anatomy and biomechanics of the neck, muscle reeducation, motor recruitment. Home Exercise Program:  Patient instructed in chin tucks, lower trap sets, wall slide withstep flex, scaption; written instructions with pictures issued, patient able to demonstrate exercises. 1/12/21:   Patient instructed in supine chin nod, supine UE flossing ; written instructions with pictures issued, patient able to demonstrate exercises. Therapeutic Exercise and NMR EXR  [x] (33595) Provided verbal/tactile cueing for activities related to strengthening, flexibility, endurance, ROM  for improvements in cervical, postural, scapular, scapulothoracic and UE control with self care, reaching, carrying, lifting, house/yardwork, driving/computer work.    [] (86716) Provided verbal/tactile cueing for activities related to improving balance, coordination, kinesthetic sense, posture, motor skill, proprioception  to assist with cervical, scapular, scapulothoracic and UE control with self care, reaching, carrying, lifting, house/yardwork, driving/computer work. Therapeutic Activities:    [] (76170 or 08258) Provided verbal/tactile cueing for activities related to improving balance, coordination, kinesthetic sense, posture, motor skill, proprioception and motor activation to allow for proper function of cervical, scapular, scapulothoracic and UE control with self care, carrying, lifting, driving/computer work.      Home Exercise Program: []? Progressing: []? Met: []? Not Met: []? Adjusted  2. Patient will have a decrease in pain to facilitate improvement in movement, function, and ADLs as indicated by Functional Deficits. []? Progressing: []? Met: []? Not Met: []? Adjusted     Long Term Goals: To be achieved in: 4-6 weeks  1. Disability index score of 10% or less for the NDI to assist with reaching prior level of function. []? Progressing: []? Met: []? Not Met: []? Adjusted  2. Patient will demonstrate increased AROM to [facility] of cervical/thoracic spine to allow for proper joint functioning as indicated by patients Functional Deficits. []? Progressing: []? Met: []? Not Met: []? Adjusted  3. Patient will demonstrate an increase in postural awareness and control and activation of  Deep cervical stabilizers to allow for proper functional mobility as indicated by patients Functional Deficits. []? Progressing: []? Met: []? Not Met: []? Adjusted  4. Patient will return to functional activities without increased symptoms or restriction. []? Progressing: []? Met: []? Not Met: []? Adjusted  5. Patient able to resume workout routine without increased symptoms   []? Progressing: []? Met: []? Not Met: []? Adjusted            ASSESSMENT:  Patient is progressing well. Treatment/Activity Tolerance:  [x] Patient tolerated treatment well [] Patient limited by fatique  [] Patient limited by pain  [] Patient limited by other medical complications  [] Other:     Overall Progression Towards Functional goals/ Treatment Progress Update:  [x] Patient is progressing as expected towards functional goals listed. [] Progression is slowed due to complexities/Impairments listed. [] Progression has been slowed due to co-morbidities.   [] Plan just implemented, too soon to assess goals progression <30days   [] Goals require adjustment due to lack of progress  [] Patient is not progressing as expected and requires additional follow up with physician  [] Other Prognosis for POC: [x] Good [] Fair  [] Poor    Patient requires continued skilled intervention: [x] Yes  [] No        PLAN: Initiate theraband ex for HEP  [x] Continue per plan of care [] Alter current plan (see comments)  [] Plan of care initiated [] Hold pending MD visit [] Discharge    Electronically signed by: Lenor Lefort, PTA   Note: If patient does not return for scheduled/recommended follow up visits, this note will serve as a discharge from care along with the most recent update on progress.

## 2021-01-19 ENCOUNTER — HOSPITAL ENCOUNTER (OUTPATIENT)
Dept: PHYSICAL THERAPY | Age: 76
Setting detail: THERAPIES SERIES
Discharge: HOME | End: 2021-01-19
Payer: MEDICARE

## 2021-01-19 PROCEDURE — 97110 THERAPEUTIC EXERCISES: CPT

## 2021-01-19 PROCEDURE — 97140 MANUAL THERAPY 1/> REGIONS: CPT

## 2021-01-19 NOTE — FLOWSHEET NOTE
[facility]  Outpatient Rehabilitation and Therapy, [address] [address]  Phone: [phone]   Fax:     [phone]    Physical Therapy Treatment Note/ Progress Report:     Date:  2021    Patient Name:  Vicky Ramirez    :  [birth year]  MRN: [id number]    Pertinent Medical History:      Medical/Treatment Diagnosis Information:  · Diagnosis: DDD, cervical M50.30; cervical stenosis of spinal canal M48.02  · Treatment Diagnosis: Neck pain    Insurance/Certification information:  PT Insurance Information: Medicare  Physician Information:  Referring Practitioner: Otto Alvarez PA-C  Plan of care signed (Y/N): Cosign requested     Date of Patient follow up with Physician: prn      Progress Report: []  Yes  [x]  No     Date Range for reporting period:  Beginnin2021  Ending:     Progress report due (10 Rx/or 30 days whichever is less):     Recertification due (POC duration/ or 90 days whichever is less):      Visit # Insurance/POC Allowable Auth Needed   5 12 []Yes    [x]No     Functional Outcomes Measure:   Date Assessed: 21  Test: Neck Disability Index  Score:8 = 16%    Pain level:  0-2/10 History of Injury:Patient reports a history of neck pain since mid-December. Patient says the pain got worse and worse, got into R arm pit. \"I thought I was having a heart attack, went to ED. He referred me to Dr. Anthony Loredo, and I was referred to orthopedics. \"  Patient underwent an MRI which showed DDD and stenosis of cervical spine. Patient was given a home c-traction collar to use at home. \"With the medicine it has been better. If I don't take the medicine, it gets worse over the next 2 days. \"  Patient is no longer having the pain in the R UE, pain is primarilly in posterior aspect of neck. Patient reports \"I do have trouble turning my head a lot\". Patient does feel some discomfort with washing hair, etc, Patient is not exercising at present because of neck. SUBJECTIVE:  See eval  1/8/21: felt good after eval. Did not need to take medication at night last night and he is still feeling pretty good  1/12/21: Patient has stopped taking medication before bed and woke up this am without pain. Patient is using home traction unit and seems to be helping. 1/15/21: Doing really well.  Near painless  1/19/21:  Patient reports feeling better overall, has some stiffness and a little tightness    OBJECTIVE:   ? Observation:   ? Test measurements:      RESTRICTIONS/PRECAUTIONS:     Exercises/Interventions:   Therapeutic Ex (59113)  Min:20 Resistance/Repetitions Notes        UE [place] flexion 10x R/L    Tslide  Rows  Green 1 x 10  Ext  Green  1 x 10  Lat Pulls  Green 1 x 10              Home Ex Program               Manual Intervention (49677)  Min: 25          STM to B CPVM 25 min                        NMR re-education (91289)  Min:               Therapeutic Activity (35730)  Min:10     Patient education          Modalities  Min: Other Therapeutic Activities:  Pt was educated on PT POC, Diagnosis, Prognosis, pathomechanics as well as frequency and duration of scheduling future physical therapy appointments. Time was also taken on this day to answer all patient questions and participation in PT. Reviewed appointment policy in detail with patient and patient verbalized understanding. Discussed at length anatomy and biomechanics of the neck, muscle reeducation, motor recruitment. Home Exercise Program:  Patient instructed in chin tucks, lower trap sets, wall slide withstep flex, scaption; written instructions with pictures issued, patient able to demonstrate exercises. 1/12/21:   Patient instructed in supine chin nod, supine UE flossing ; written instructions with pictures issued, patient able to demonstrate exercises. 1/19/21:   Patient instructed in wall push up, doorway stretch ; written instructions with pictures issued, patient able to demonstrate exercises. Therapeutic Exercise and NMR EXR  [x] (73771) Provided verbal/tactile cueing for activities related to strengthening, flexibility, endurance, ROM  for improvements in cervical, postural, scapular, scapulothoracic and UE control with self care, reaching, carrying, lifting, house/yardwork, driving/computer work.    [] (00680) Provided verbal/tactile cueing for activities related to improving balance, coordination, kinesthetic sense, posture, motor skill, proprioception  to assist with cervical, scapular, scapulothoracic and UE control with self care, reaching, carrying, lifting, house/yardwork, driving/computer work. Therapeutic Activities:    [] (34712 or 78653) Provided verbal/tactile cueing for activities related to improving balance, coordination, kinesthetic sense, posture, motor skill, proprioception and motor activation to allow for proper function of cervical, scapular, scapulothoracic and UE control with self care, carrying, lifting, driving/computer work. Home Exercise Program:    [x] (93604) Reviewed/Progressed HEP activities related to strengthening, flexibility, endurance, ROM of cervical, scapular, scapulothoracic and UE control with self care, reaching, carrying, lifting, house/yardwork, driving/computer work  [] (92296) Reviewed/Progressed HEP activities related to improving balance, coordination, kinesthetic sense, posture, motor skill, proprioception of cervical, scapular, scapulothoracic and UE control with self care, reaching, carrying, lifting, house/yardwork, driving/computer work      Manual Treatments:  PROM / STM / Oscillations-Mobs:  G-I, II, III, IV (PA's, Inf., Post.)  [x] (85466) Provided manual therapy to mobilize soft tissue/joints of cervical/CT, scapular GHJ and UE for the purpose of decreasing headache, modulating pain, promoting relaxation,  increasing ROM, reducing/eliminating soft tissue swelling/inflammation/restriction, improving soft tissue extensibility and allowing for proper ROM for normal function with self care, reaching, carrying, lifting, house/yardwork, driving/computer work      Charges:  Timed Code Treatment Minutes: 45   Total Treatment Minutes: 45     [] EVAL (LOW) 48020 (typically 20 minutes face-to-face)  [] EVAL (MOD) 14285 (typically 30 minutes face-to-face)  [] EVAL (HIGH) 43145 (typically 45 minutes face-to-face)  [] RE-EVAL     [x] AA(73835) x     [] Dry needle 1 or 2 Muscles (52951)  [] NMR (85135) x     [] Dry needle 3+ Muscles (32103)  [x] Manual (18766) x 2    [] Ultrasound (08214) x  [] TA (59525) x     [] Mech Traction (11140)  [] ES(attended) (88153)     [] ES (un) (74606):   [] Vasopump (89421) [] Ionto (24768)   [] Other:    GOALS:  Patient stated goal: \"pain relief  []? Progressing: []? Met: []? Not Met: []? Adjusted     Therapist goals for Patient:   Short Term Goals: To be achieved in: 2-4weeks  1. Independent in HEP and progression per patient tolerance, in order to prevent re-injury. []? Progressing: []? Met: []? Not Met: []? Adjusted  2. Patient will have a decrease in pain to facilitate improvement in movement, function, and ADLs as indicated by Functional Deficits. []? Progressing: []? Met: []? Not Met: []? Adjusted     Long Term Goals: To be achieved in: 4-6 weeks  1. Disability index score of 10% or less for the NDI to assist with reaching prior level of function. []? Progressing: []? Met: []? Not Met: []? Adjusted  2. Patient will demonstrate increased AROM to [facility] of cervical/thoracic spine to allow for proper joint functioning as indicated by patients Functional Deficits. []? Progressing: []? Met: []? Not Met: []? Adjusted  3. Patient will demonstrate an increase in postural awareness and control and activation of  Deep cervical stabilizers to allow for proper functional mobility as indicated by patients Functional Deficits. []? Progressing: []? Met: []? Not Met: []? Adjusted  4. Patient will return to functional activities without increased symptoms or restriction. []? Progressing: []? Met: []? Not Met: []? Adjusted  5. Patient able to resume workout routine without increased symptoms   []? Progressing: []? Met: []? Not Met: []? Adjusted            ASSESSMENT:  Patient is progressing well. Treatment/Activity Tolerance:  [x] Patient tolerated treatment well [] Patient limited by fatique  [] Patient limited by pain  [] Patient limited by other medical complications  [] Other:     Overall Progression Towards Functional goals/ Treatment Progress Update:  [x] Patient is progressing as expected towards functional goals listed. [] Progression is slowed due to complexities/Impairments listed. [] Progression has been slowed due to co-morbidities.   [] Plan just implemented, too soon to assess goals progression <30days   [] Goals require adjustment due to lack of progress  [] Patient is not progressing as expected and requires additional follow up with physician  [] Other Prognosis for POC: [x] Good [] Fair  [] Poor    Patient requires continued skilled intervention: [x] Yes  [] No        PLAN: Initiate theraband ex for HEP  [x] Continue per plan of care [] Alter current plan (see comments)  [] Plan of care initiated [] Hold pending MD visit [] Discharge    Electronically signed by: Brandon Chance, PT [id number]  Note: If patient does not return for scheduled/recommended follow up visits, this note will serve as a discharge from care along with the most recent update on progress.

## 2021-01-22 ENCOUNTER — HOSPITAL ENCOUNTER (OUTPATIENT)
Dept: PHYSICAL THERAPY | Age: 76
Setting detail: THERAPIES SERIES
Discharge: HOME | End: 2021-01-22
Payer: MEDICARE

## 2021-01-22 PROCEDURE — 97140 MANUAL THERAPY 1/> REGIONS: CPT

## 2021-01-22 PROCEDURE — 97110 THERAPEUTIC EXERCISES: CPT

## 2021-01-22 NOTE — FLOWSHEET NOTE
[facility]  Outpatient Rehabilitation and Therapy, [place] [address]  Phone: [phone]   Fax:     [phone]    Physical Therapy Treatment Note/ Progress Report:     Date:  2021    Patient Name:  Kathy Granados    :    MRN: [id number]    Pertinent Medical History:      Medical/Treatment Diagnosis Information:  · Diagnosis: DDD, cervical M50.30; cervical stenosis of spinal canal M48.02  · Treatment Diagnosis: Neck pain    Insurance/Certification information:  PT Insurance Information: Medicare  Physician Information:  Referring Practitioner: Madelin Winston PA-C  Plan of care signed (Y/N): Cosign requested     Date of Patient follow up with Physician: prn      Progress Report: []  Yes  [x]  No     Date Range for reporting period:  Beginnin2021  Ending:     Progress report due (10 Rx/or 30 days whichever is less):     Recertification due (POC duration/ or 90 days whichever is less):      Visit # Insurance/POC Allowable Auth Needed   6 12 []Yes    [x]No     Functional Outcomes Measure:   Date Assessed: 21  Test: Neck Disability Index  Score:8 = 16%    Pain level:  0-2/10 History of Injury:Patient reports a history of neck pain since mid-December. Patient says the pain got worse and worse, got into R arm pit. \"I thought I was having a heart attack, went to ED. He referred me to Dr. Shaquille Amin, and I was referred to orthopedics. \"  Patient underwent an MRI which showed DDD and stenosis of cervical spine. Patient was given a home c-traction collar to use at home. \"With the medicine it has been better. If I don't take the medicine, it gets worse over the next 2 days. \"  Patient is no longer having the pain in the R UE, pain is primarilly in posterior aspect of neck. Patient reports \"I do have trouble turning my head a lot\". Patient does feel some discomfort with washing hair, etc, Patient is not exercising at present because of neck. SUBJECTIVE:  See eval  1/8/21: felt good after eval. Did not need to take medication at night last night and he is still feeling pretty good  1/12/21: Patient has stopped taking medication before bed and woke up this am without pain. Patient is using home traction unit and seems to be helping. 1/15/21: Doing really well. Near painless  1/19/21:  Patient reports feeling better overall, has some stiffness and a little tightness  1/22: Neck is over 90% better.  Still achy in shoulders    OBJECTIVE:   ? Observation:   ? Test measurements:      RESTRICTIONS/PRECAUTIONS:     Exercises/Interventions:   Therapeutic Ex (48123)  Min:20 Resistance/Repetitions Notes        UE [place] flexion 10x R/L    Tslide  Rows  Green 1 x 10  Ext  Green  1 x 10  Lat Pulls  Green 1 x 10              Home Ex Program               Manual Intervention (63500)  Min: 25          STM to B CPVM 25 min                        NMR re-education (26301)  Min:               Therapeutic Activity (94711)  Min:10     Patient education          Modalities  Min: [] (96326 or 12717) Provided verbal/tactile cueing for activities related to improving balance, coordination, kinesthetic sense, posture, motor skill, proprioception and motor activation to allow for proper function of cervical, scapular, scapulothoracic and UE control with self care, carrying, lifting, driving/computer work.      Home Exercise Program:    [x] (93416) Reviewed/Progressed HEP activities related to strengthening, flexibility, endurance, ROM of cervical, scapular, scapulothoracic and UE control with self care, reaching, carrying, lifting, house/yardwork, driving/computer work  [] (92668) Reviewed/Progressed HEP activities related to improving balance, coordination, kinesthetic sense, posture, motor skill, proprioception of cervical, scapular, scapulothoracic and UE control with self care, reaching, carrying, lifting, house/yardwork, driving/computer work      Manual Treatments:  PROM / STM / Oscillations-Mobs:  G-I, II, III, IV (PA's, Inf., Post.)  [x] (33668) Provided manual therapy to mobilize soft tissue/joints of cervical/CT, scapular GHJ and UE for the purpose of decreasing headache, modulating pain, promoting relaxation,  increasing ROM, reducing/eliminating soft tissue swelling/inflammation/restriction, improving soft tissue extensibility and allowing for proper ROM for normal function with self care, reaching, carrying, lifting, house/yardwork, driving/computer work      Charges:  Timed Code Treatment Minutes: 45   Total Treatment Minutes: 45     [] EVAL (LOW) 24215 (typically 20 minutes face-to-face)  [] EVAL (MOD) 05107 (typically 30 minutes face-to-face)  [] EVAL (HIGH) 93348 (typically 45 minutes face-to-face)  [] RE-EVAL     [x] RU(96840) x     [] Dry needle 1 or 2 Muscles (76749)  [] NMR (86908) x     [] Dry needle 3+ Muscles (02502)  [x] Manual (35891) x 2    [] Ultrasound (38937) x  [] TA (32502) x     [] Mech Traction (64124)  [] ES(attended) (74272)     [] ES (un) (85567): [] Vasopump (27156) [] Ionto (28523)   [] Other:    GOALS:  Patient stated goal: \"pain relief  []? Progressing: []? Met: []? Not Met: []? Adjusted     Therapist goals for Patient:   Short Term Goals: To be achieved in: 2-4weeks  1. Independent in HEP and progression per patient tolerance, in order to prevent re-injury. []? Progressing: []? Met: []? Not Met: []? Adjusted  2. Patient will have a decrease in pain to facilitate improvement in movement, function, and ADLs as indicated by Functional Deficits. []? Progressing: []? Met: []? Not Met: []? Adjusted     Long Term Goals: To be achieved in: 4-6 weeks  1. Disability index score of 10% or less for the NDI to assist with reaching prior level of function. []? Progressing: []? Met: []? Not Met: []? Adjusted  2. Patient will demonstrate increased AROM to [facility] of cervical/thoracic spine to allow for proper joint functioning as indicated by patients Functional Deficits. []? Progressing: []? Met: []? Not Met: []? Adjusted  3. Patient will demonstrate an increase in postural awareness and control and activation of  Deep cervical stabilizers to allow for proper functional mobility as indicated by patients Functional Deficits. []? Progressing: []? Met: []? Not Met: []? Adjusted  4. Patient will return to functional activities without increased symptoms or restriction. []? Progressing: []? Met: []? Not Met: []? Adjusted  5. Patient able to resume workout routine without increased symptoms   []? Progressing: []? Met: []? Not Met: []? Adjusted            ASSESSMENT:  Patient is progressing well. Treatment/Activity Tolerance:  [x] Patient tolerated treatment well [] Patient limited by fatique  [] Patient limited by pain  [] Patient limited by other medical complications  [] Other:     Overall Progression Towards Functional goals/ Treatment Progress Update:  [x] Patient is progressing as expected towards functional goals listed. [] Progression is slowed due to complexities/Impairments listed. [] Progression has been slowed due to co-morbidities. [] Plan just implemented, too soon to assess goals progression <30days   [] Goals require adjustment due to lack of progress  [] Patient is not progressing as expected and requires additional follow up with physician  [] Other    Prognosis for POC: [x] Good [] Fair  [] Poor    Patient requires continued skilled intervention: [x] Yes  [] No        PLAN:   [x] Continue per plan of care [] Alter current plan (see comments)  [] Plan of care initiated [] Hold pending MD visit [] Discharge    Electronically signed by: Mustapha Titus PTA   Note: If patient does not return for scheduled/recommended follow up visits, this note will serve as a discharge from care along with the most recent update on progress.

## 2021-01-26 ENCOUNTER — HOSPITAL ENCOUNTER (OUTPATIENT)
Dept: PHYSICAL THERAPY | Age: 76
Setting detail: THERAPIES SERIES
Discharge: HOME | End: 2021-01-26
Payer: MEDICARE

## 2021-01-26 PROCEDURE — 97110 THERAPEUTIC EXERCISES: CPT

## 2021-01-26 PROCEDURE — 97140 MANUAL THERAPY 1/> REGIONS: CPT

## 2021-01-26 NOTE — FLOWSHEET NOTE
[facility]  Outpatient Rehabilitation and Therapy, [facility] [address] [place] [address]  Phone: [phone]   Fax:     [phone] [address] and Therapy, [facility] [address] [place] [address]  Phone: [phone]   Fax:     [phone]       Physical Therapy Discharge Summary    Dear Pratik Lopez ,    We had the pleasure of treating the following patient for physical therapy services at [address] [place] [address]. A summary of our findings can be found in the discharge summary below. If you have any questions or concerns regarding these findings, please do not hesitate to contact me at the office phone number above. Thank you for the referral.     Overall Response to Treatment:   [x]Patient is responding well to treatment and improvement is noted with regards  to goals   []Patient should continue to improve in reasonable time if they continue HEP   []Patient has plateaued and is no longer responding to skilled PT intervention    []Patient is getting worse and would benefit from return to referring MD   []Patient unable to adhere to initial POC   []Other:     Date range of Visits: 21 thru 21  Total Visits: 7      Physical Therapy Treatment Note/ Progress Report/Discharge:     Date:  2021    Patient Name:  Tawnya Silver    :  [id number]  MRN: [id number]    Pertinent Medical History:      Medical/Treatment Diagnosis Information:  · Diagnosis: DDD, cervical M50.30; cervical stenosis of spinal canal M48.02  · Treatment Diagnosis: Neck pain    Insurance/Certification information:  PT Insurance Information: Medicare  Physician Information:  Referring Practitioner: Nallely Crawford PA-C  Plan of care signed (Y/N): Cosign requested     Date of Patient follow up with Physician: prn      Progress Report: [x]  Yes  []  No Date Range for reporting period:  Beginnin21  Endin21    Progress report due (10 Rx/or 30 days whichever is less):     Recertification due (POC duration/ or 90 days whichever is less):      Visit # Insurance/POC Allowable Auth Needed   7 12 []Yes    [x]No     Functional Outcomes Measure:   Date Assessed: 21  Test: Neck Disability Index  Score:8 = 16%  21:  Score = 1 = 2% Disability    Pain level:  0-1/10      History of Injury:Patient reports a history of neck pain since mid-December. Patient says the pain got worse and worse, got into R arm pit. \"I thought I was having a heart attack, went to ED. He referred me to Dr. Ace Nash, and I was referred to orthopedics. \"  Patient underwent an MRI which showed DDD and stenosis of cervical spine. Patient was given a home c-traction collar to use at home. \"With the medicine it has been better. If I don't take the medicine, it gets worse over the next 2 days. \"  Patient is no longer having the pain in the R UE, pain is primarilly in posterior aspect of neck. Patient reports \"I do have trouble turning my head a lot\". Patient does feel some discomfort with washing hair, etc, Patient is not exercising at present because of neck. SUBJECTIVE:  Patient reports \"I'm in the high 90s% better - I  Haven't had any pain. \"    OBJECTIVE:   ? Observation:   ? Test measurements:      RESTRICTIONS/PRECAUTIONS:     Exercises/Interventions:   Therapeutic Ex (40079)  Min:20 Resistance/Repetitions Notes        UE [place] flexion 10x R/L    Tslide  Rows  Green 1 x 10  Ext  Green  1 x 10  Lat Pulls  Green 1 x 10              Home Ex Program               Manual Intervention (59481)  Min: 25          STM to B CPVM 25 min                        NMR re-education (18521)  Min:               Therapeutic Activity (44301)  Min:10     Patient education          Modalities  Min: [] (84356 or 36396) Provided verbal/tactile cueing for activities related to improving balance, coordination, kinesthetic sense, posture, motor skill, proprioception and motor activation to allow for proper function of cervical, scapular, scapulothoracic and UE control with self care, carrying, lifting, driving/computer work.      Home Exercise Program:    [x] (76377) Reviewed/Progressed HEP activities related to strengthening, flexibility, endurance, ROM of cervical, scapular, scapulothoracic and UE control with self care, reaching, carrying, lifting, house/yardwork, driving/computer work  [] (41891) Reviewed/Progressed HEP activities related to improving balance, coordination, kinesthetic sense, posture, motor skill, proprioception of cervical, scapular, scapulothoracic and UE control with self care, reaching, carrying, lifting, house/yardwork, driving/computer work      Manual Treatments:  PROM / STM / Oscillations-Mobs:  G-I, II, III, IV (PA's, Inf., Post.)  [x] (62255) Provided manual therapy to mobilize soft tissue/joints of cervical/CT, scapular GHJ and UE for the purpose of decreasing headache, modulating pain, promoting relaxation,  increasing ROM, reducing/eliminating soft tissue swelling/inflammation/restriction, improving soft tissue extensibility and allowing for proper ROM for normal function with self care, reaching, carrying, lifting, house/yardwork, driving/computer work      Charges:  Timed Code Treatment Minutes: 45   Total Treatment Minutes: 45     [] EVAL (LOW) 24060 (typically 20 minutes face-to-face)  [] EVAL (MOD) 20101 (typically 30 minutes face-to-face)  [] EVAL (HIGH) 58411 (typically 45 minutes face-to-face)  [] RE-EVAL     [x] WV(47405) x     [] Dry needle 1 or 2 Muscles (36698)  [] NMR (82836) x     [] Dry needle 3+ Muscles (93260)  [x] Manual (90768) x 2    [] Ultrasound (73329) x  [] TA (60436) x     [] Mech Traction (72271)  [] ES(attended) (72835)     [] ES (un) (32209): [x] Patient is progressing as expected towards functional goals listed. [] Progression is slowed due to complexities/Impairments listed. [] Progression has been slowed due to co-morbidities. [] Plan just implemented, too soon to assess goals progression <30days   [] Goals require adjustment due to lack of progress  [] Patient is not progressing as expected and requires additional follow up with physician  [] Other    Prognosis for POC: [x] Good [] Fair  [] Poor    Patient requires continued skilled intervention: [x] Yes  [] No        PLAN:   [] Continue per plan of care [] Alter current plan (see comments)  [] Plan of care initiated [] Hold pending MD visit [x] Discharge    Electronically signed by: Lisa Olivo, PT [id number]  Note: If patient does not return for scheduled/recommended follow up visits, this note will serve as a discharge from care along with the most recent update on progress.

## 2021-01-28 ENCOUNTER — OFFICE VISIT (OUTPATIENT)
Dept: FAMILY MEDICINE CLINIC | Age: 76
End: 2021-01-28
Payer: MEDICARE

## 2021-01-28 VITALS
HEART RATE: 84 BPM | BODY MASS INDEX: 28.45 KG/M2 | DIASTOLIC BLOOD PRESSURE: 84 MMHG | SYSTOLIC BLOOD PRESSURE: 128 MMHG | WEIGHT: 177 LBS | HEIGHT: 66 IN | TEMPERATURE: 96.8 F

## 2021-01-28 DIAGNOSIS — E78.2 MIXED HYPERLIPIDEMIA: Primary | ICD-10-CM

## 2021-01-28 DIAGNOSIS — E78.2 MIXED HYPERLIPIDEMIA: ICD-10-CM

## 2021-01-28 DIAGNOSIS — Z12.11 ENCOUNTER FOR SCREENING COLONOSCOPY FOR NON-HIGH-RISK PATIENT: ICD-10-CM

## 2021-01-28 LAB
CHOLESTEROL, TOTAL: 163 MG/DL (ref 0–199)
HDLC SERPL-MCNC: 54 MG/DL (ref 40–60)
LDL CHOLESTEROL CALCULATED: 99 MG/DL
TRIGL SERPL-MCNC: 48 MG/DL (ref 0–150)
VLDLC SERPL CALC-MCNC: 10 MG/DL

## 2021-01-28 PROCEDURE — 3017F COLORECTAL CA SCREEN DOC REV: CPT | Performed by: FAMILY MEDICINE

## 2021-01-28 PROCEDURE — G8427 DOCREV CUR MEDS BY ELIG CLIN: HCPCS | Performed by: FAMILY MEDICINE

## 2021-01-28 PROCEDURE — G8484 FLU IMMUNIZE NO ADMIN: HCPCS | Performed by: FAMILY MEDICINE

## 2021-01-28 PROCEDURE — 4040F PNEUMOC VAC/ADMIN/RCVD: CPT | Performed by: FAMILY MEDICINE

## 2021-01-28 PROCEDURE — G8510 SCR DEP NEG, NO PLAN REQD: HCPCS | Performed by: FAMILY MEDICINE

## 2021-01-28 PROCEDURE — 99213 OFFICE O/P EST LOW 20 MIN: CPT | Performed by: FAMILY MEDICINE

## 2021-01-28 PROCEDURE — 1036F TOBACCO NON-USER: CPT | Performed by: FAMILY MEDICINE

## 2021-01-28 PROCEDURE — 1123F ACP DISCUSS/DSCN MKR DOCD: CPT | Performed by: FAMILY MEDICINE

## 2021-01-28 PROCEDURE — G8417 CALC BMI ABV UP PARAM F/U: HCPCS | Performed by: FAMILY MEDICINE

## 2021-01-28 ASSESSMENT — ENCOUNTER SYMPTOMS
CONSTIPATION: 0
CHEST TIGHTNESS: 0
ABDOMINAL DISTENTION: 0
BLOOD IN STOOL: 0
NAUSEA: 0
VOMITING: 0
SHORTNESS OF BREATH: 0
DIARRHEA: 0
ABDOMINAL PAIN: 0

## 2021-01-28 ASSESSMENT — PATIENT HEALTH QUESTIONNAIRE - PHQ9
SUM OF ALL RESPONSES TO PHQ QUESTIONS 1-9: 0
SUM OF ALL RESPONSES TO PHQ QUESTIONS 1-9: 0
1. LITTLE INTEREST OR PLEASURE IN DOING THINGS: 0

## 2021-01-28 NOTE — PROGRESS NOTES
Cuff Size:  Medium Adult      Pulse:         84           Temp:    96.8 °F (36 °C)    TempSrc:    Temporal        Weight: 177 lb (80.3 kg)    Height: 5' 5.5\" (1.664 m)  ---------------------------  Body mass index is 29.01 kg/m². Wt Readings from Last 3 Encounters:  01/28/21 : 177 lb (80.3 kg)  12/23/20 : 176 lb (79.8 kg)  12/07/20 : 176 lb (79.8 kg)    BP Readings from Last 3 Encounters:  01/28/21 : 128/84  12/07/20 : 124/82  11/29/20 : (!) 161/92            Review of Systems   Constitutional: Negative for appetite change, chills, fever and unexpected weight change. Respiratory: Negative for chest tightness and shortness of breath. Cardiovascular: Negative for chest pain, palpitations and leg swelling. Gastrointestinal: Negative for abdominal distention, abdominal pain, blood in stool, constipation, diarrhea, nausea and vomiting. Genitourinary: Negative for difficulty urinating, dysuria and hematuria. Neurological: Negative for headaches. Physical Exam  Constitutional:       General: He is not in acute distress. Appearance: Normal appearance. He is well-developed. He is not ill-appearing or diaphoretic. Neck:      Thyroid: No thyroid mass or thyromegaly. Cardiovascular:      Rate and Rhythm: Normal rate. Rhythm irregular. Heart sounds: Normal heart sounds. No murmur. No friction rub. No gallop. Comments:     Pulmonary:      Effort: Pulmonary effort is normal. No tachypnea, accessory muscle usage or respiratory distress. Breath sounds: Normal breath sounds. No decreased breath sounds, wheezing, rhonchi or rales. Abdominal:      General: Bowel sounds are normal. There is no distension or abdominal bruit. Palpations: Abdomen is soft. There is no hepatomegaly, splenomegaly, mass or pulsatile mass. Tenderness: There is no abdominal tenderness. There is no guarding. Lymphadenopathy:      Cervical: No cervical adenopathy.       Upper Body: Right upper body: No supraclavicular adenopathy. Left upper body: No supraclavicular adenopathy. Skin:     General: Skin is warm and dry. Coloration: Skin is not pale. Nails: There is no clubbing. Neurological:      General: No focal deficit present. Mental Status: He is alert. An electronic signature was used to authenticate this note.     --Alexander Pacheco MD

## 2021-04-14 NOTE — PROGRESS NOTES
[address]  [birth year]    April 15, 2021    CC: \"I feel well\"     HPI:  The patient is 70 y.o. male with a past medical history significant for atrial fibrillation, hyperlipidemia and hypothyroidism. He was previously following with my former partner Dr. Alba Calvert and presents today to establish care. He reports feeling well overall. His breathing has been comfortable without shortness of breath. He denies feelings of his heart racing or skipping beats. Patient denies exertional chest pain/pressure, dyspnea at rest, DE SOUZA, PND, orthopnea, palpitations, lightheadedness, weight changes, changes in LE edema, and syncope. Review of Systems:  Constitutional: No fatigue, weakness, night sweats or fever. HEENT: No new vision difficulties or ringing in the ears. Respiratory: No new SOB, PND, orthopnea or cough. Cardiovascular: See HPI   GI: No n/v, diarrhea, constipation, abdominal pain or changes in bowel habits. No melena, no hematochezia  : No urinary frequency, urgency, incontinence, hematuria or dysuria. Skin: No cyanosis or skin lesions. Musculoskeletal: No new muscle or joint pain. Neurological: No syncope or TIA-like symptoms.   Psychiatric: No anxiety, insomnia or depression     Past Medical History:   Diagnosis Date    Atrial fibrillation ([address]     new onset    Kidney stone     Mixed hyperlipidemia     managed by PCP    Near syncope     Palpitations     Prostate enlargement     Syncope and collapse      Past Surgical History:   Procedure Laterality Date    COLONOSCOPY  9/26/2001    negative, deak, also neg sigmoid 2007    COLONOSCOPY  2/15/2011    negative dr Kike Saunders       Family History   Problem Relation Age of Onset    Rheum Arthritis Brother     Heart Disease Brother     Heart Disease Mother         older age   [facility] Dementia Father      Social History     Tobacco Use    Smoking status: Never Smoker    Smokeless tobacco: Never Used   Substance Use Topics    Alcohol use: Not Currently     Alcohol/week: 0.0 standard drinks     Comment: minimal caffeine occasional wine    Drug use: No       No Known Allergies  Current Outpatient Medications   Medication Sig Dispense Refill    pravastatin (PRAVACHOL) 40 MG tablet TAKE 1 TABLET BY MOUTH EVERY EVENING 90 tablet 1    metoprolol succinate (TOPROL XL) 50 MG extended release tablet TAKE 1 TABLET BY MOUTH EVERY DAY 90 tablet 3    aspirin 325 MG EC tablet Take 1 tablet by mouth daily 30 tablet 3    clotrimazole (LOTRIMIN) 1 % cream Apply topically 2 times daily Apply topically 2 times daily.  Acetaminophen 650 MG TABS Take by mouth 2 times daily       Glucosamine-Chondroitin-MSM (TRIPLE FLEX PO) Take by mouth 3 times daily       Terbinafine HCl (LAMISIL EX) Apply  topically.  Multiple Vitamins-Minerals (MADHU MULTIVITAMIN FOR MEN PO) Take  by mouth. For memory takes 1 tablet      SAW PALMETTO Take by mouth 2 times daily        No current facility-administered medications for this visit. [id number] Score for Atrial Fibrillation Stroke Risk   Risk   Factors  Component Value   C CHF No 0   H HTN No 0   A2 Age >= 76 No,  (80 y.o.) 0   D DM No 0   S2 Prior Stroke/TIA No 0   V Vascular Disease No 0   A Age 74-69 Yes,  (80 y.o.) 1   Sc Sex male 0    [id number]  Score  1   Score last updated 4/14/21 2:14 AM EDT    Click here for a link to the UpToDate guideline \"Atrial Fibrillation: Anticoagulation therapy to prevent embolization    Disclaimer: Risk Score calculation is dependent on accuracy of patient problem list and past encounter diagnosis.     Physical Exam:   /86   Pulse 65   Ht 5' 5.5\" (1.664 m)   Wt 179 lb 9.6 oz (81.5 kg)   SpO2 95%   BMI 29.43 kg/m²   No intake or output data in the 24 hours ending 04/15/21 1225  Wt Readings from Last 2 Encounters:   04/15/21 179 lb 9.6 oz (81.5 kg)   01/28/21 177 lb (80.3 kg)     Constitutional: He is oriented to person, place, and time. He appears well-developed and well-nourished. In no acute distress. Head: Normocephalic and atraumatic. Neck: Neck supple. No JVD present. Carotid bruit is not present. No mass and no thyromegaly present. No lymphadenopathy present. Cardiovascular: Irreg irreg, normal heart sounds and intact distal pulses. Exam reveals no gallop and no friction rub. No murmur heard. Pulmonary/Chest: Effort normal and breath sounds normal. No respiratory distress. He has no wheezes, rhonchi or rales. Abdominal: Soft, non-tender. Bowel sounds and aorta are normal. He exhibits no organomegaly, mass or bruit. Extremities: No edema, cyanosis, or clubbing. Pulses are 2+ radial/carotid/dorsalis pedis and posterior tibial bilaterally. Neurological: He is alert and oriented to person, place, and time. He has normal reflexes. No cranial nerve deficit. Coordination normal.   Skin: Skin is warm and dry. There is no rash or diaphoresis. Psychiatric: He has a normal mood and affect. His speech is normal and behavior is normal.     EKG Interpretation 4/15/21: Atrial fibrillation    Imaging:     Echo 1/22/13  IMPRESSION:  Normal left ventricular size and systolic function. Estimated  ejection fraction is 60%. There is mild mitral and tricuspid regurgitation,  trivial pulmonic regurgitation. Normal PA systolic pressure and mild left  atrial enlargement. Lab Review:   Lab Results   Component Value Date    TRIG 48 01/28/2021    HDL 54 01/28/2021    HDL 57 01/11/2012    LDLCALC 99 01/28/2021    LABVLDL 10 01/28/2021     Lab Results   Component Value Date     11/29/2020    K 5.1 11/29/2020    BUN 15 11/29/2020    CREATININE 1.0 11/29/2020         Assessment:  1. Atrial fibrillation, persistent   2. Hyperlipidemia with LDL goal <130 mg/dL     Plan:  I think that Mr. Nani Still  is entirely stable from a cardiovascular standpoint.  I see no need to make any changes currently in his medical regimen or pursue further testing. He continues in rate controlled atrial fibrillation by EKG. His CHADS-Vasc is 1 and he is not currently on anticoagulation but taking aspirin 325 mg daily. I will see him in the office for follow up in 1 year. This note was scribed in the presence of Fermin Lima MD by [employer], RN. Physician Attestation:  The scribes documentation has been prepared under my direction and personally reviewed by me in its entirety. I, Dr. Melissa Dawkins personally performed the services described in this documentation as scribed by my RN in my presence, and I confirm that the note above accurately reflects all work, treatment, procedures, and medical decision making performed by me.

## 2021-04-15 ENCOUNTER — OFFICE VISIT (OUTPATIENT)
Dept: CARDIOLOGY CLINIC | Age: 76
End: 2021-04-15
Payer: MEDICARE

## 2021-04-15 VITALS
WEIGHT: 179.6 LBS | HEART RATE: 65 BPM | DIASTOLIC BLOOD PRESSURE: 86 MMHG | SYSTOLIC BLOOD PRESSURE: 132 MMHG | HEIGHT: 66 IN | OXYGEN SATURATION: 95 % | BODY MASS INDEX: 28.87 KG/M2

## 2021-04-15 DIAGNOSIS — E78.5 HYPERLIPIDEMIA LDL GOAL <130: ICD-10-CM

## 2021-04-15 DIAGNOSIS — I48.19 PERSISTENT ATRIAL FIBRILLATION (HCC): Primary | ICD-10-CM

## 2021-04-15 PROCEDURE — 99213 OFFICE O/P EST LOW 20 MIN: CPT | Performed by: INTERNAL MEDICINE

## 2021-04-15 PROCEDURE — 93000 ELECTROCARDIOGRAM COMPLETE: CPT | Performed by: INTERNAL MEDICINE

## 2021-04-15 PROCEDURE — 4040F PNEUMOC VAC/ADMIN/RCVD: CPT | Performed by: INTERNAL MEDICINE

## 2021-04-15 PROCEDURE — 3017F COLORECTAL CA SCREEN DOC REV: CPT | Performed by: INTERNAL MEDICINE

## 2021-04-15 PROCEDURE — 1123F ACP DISCUSS/DSCN MKR DOCD: CPT | Performed by: INTERNAL MEDICINE

## 2021-04-15 PROCEDURE — G8417 CALC BMI ABV UP PARAM F/U: HCPCS | Performed by: INTERNAL MEDICINE

## 2021-04-15 PROCEDURE — 1036F TOBACCO NON-USER: CPT | Performed by: INTERNAL MEDICINE

## 2021-04-15 PROCEDURE — G8427 DOCREV CUR MEDS BY ELIG CLIN: HCPCS | Performed by: INTERNAL MEDICINE

## 2021-07-30 ENCOUNTER — OFFICE VISIT (OUTPATIENT)
Dept: FAMILY MEDICINE CLINIC | Age: 76
End: 2021-07-30
Payer: MEDICARE

## 2021-07-30 VITALS
HEART RATE: 80 BPM | WEIGHT: 175 LBS | HEIGHT: 66 IN | BODY MASS INDEX: 28.12 KG/M2 | TEMPERATURE: 97.9 F | OXYGEN SATURATION: 98 % | DIASTOLIC BLOOD PRESSURE: 76 MMHG | SYSTOLIC BLOOD PRESSURE: 134 MMHG

## 2021-07-30 DIAGNOSIS — Z12.11 COLON CANCER SCREENING: ICD-10-CM

## 2021-07-30 DIAGNOSIS — Z12.5 PROSTATE CANCER SCREENING: ICD-10-CM

## 2021-07-30 DIAGNOSIS — R22.9 SKIN NODULE: ICD-10-CM

## 2021-07-30 DIAGNOSIS — E78.2 MIXED HYPERLIPIDEMIA: Primary | ICD-10-CM

## 2021-07-30 DIAGNOSIS — E78.2 MIXED HYPERLIPIDEMIA: ICD-10-CM

## 2021-07-30 LAB
A/G RATIO: 2.3 (ref 1.1–2.2)
ALBUMIN SERPL-MCNC: 4.5 G/DL (ref 3.4–5)
ALP BLD-CCNC: 62 U/L (ref 40–129)
ALT SERPL-CCNC: 22 U/L (ref 10–40)
ANION GAP SERPL CALCULATED.3IONS-SCNC: 12 MMOL/L (ref 3–16)
AST SERPL-CCNC: 42 U/L (ref 15–37)
BILIRUB SERPL-MCNC: 0.3 MG/DL (ref 0–1)
BUN BLDV-MCNC: 18 MG/DL (ref 7–20)
CALCIUM SERPL-MCNC: 9 MG/DL (ref 8.3–10.6)
CHLORIDE BLD-SCNC: 108 MMOL/L (ref 99–110)
CHOLESTEROL, TOTAL: 185 MG/DL (ref 0–199)
CO2: 23 MMOL/L (ref 21–32)
CREAT SERPL-MCNC: 1 MG/DL (ref 0.8–1.3)
GFR AFRICAN AMERICAN: >60
GFR NON-AFRICAN AMERICAN: >60
GLOBULIN: 2 G/DL
GLUCOSE BLD-MCNC: 100 MG/DL (ref 70–99)
HDLC SERPL-MCNC: 52 MG/DL (ref 40–60)
LDL CHOLESTEROL CALCULATED: 112 MG/DL
POTASSIUM SERPL-SCNC: 4.1 MMOL/L (ref 3.5–5.1)
PROSTATE SPECIFIC ANTIGEN: 3.31 NG/ML (ref 0–4)
SODIUM BLD-SCNC: 143 MMOL/L (ref 136–145)
TOTAL PROTEIN: 6.5 G/DL (ref 6.4–8.2)
TRIGL SERPL-MCNC: 104 MG/DL (ref 0–150)
VLDLC SERPL CALC-MCNC: 21 MG/DL

## 2021-07-30 PROCEDURE — 3017F COLORECTAL CA SCREEN DOC REV: CPT | Performed by: FAMILY MEDICINE

## 2021-07-30 PROCEDURE — G8417 CALC BMI ABV UP PARAM F/U: HCPCS | Performed by: FAMILY MEDICINE

## 2021-07-30 PROCEDURE — G8427 DOCREV CUR MEDS BY ELIG CLIN: HCPCS | Performed by: FAMILY MEDICINE

## 2021-07-30 PROCEDURE — 4040F PNEUMOC VAC/ADMIN/RCVD: CPT | Performed by: FAMILY MEDICINE

## 2021-07-30 PROCEDURE — 99213 OFFICE O/P EST LOW 20 MIN: CPT | Performed by: FAMILY MEDICINE

## 2021-07-30 PROCEDURE — 1036F TOBACCO NON-USER: CPT | Performed by: FAMILY MEDICINE

## 2021-07-30 PROCEDURE — 1123F ACP DISCUSS/DSCN MKR DOCD: CPT | Performed by: FAMILY MEDICINE

## 2021-07-30 ASSESSMENT — ENCOUNTER SYMPTOMS
CONSTIPATION: 0
SHORTNESS OF BREATH: 0
NAUSEA: 0
BLOOD IN STOOL: 0
DIARRHEA: 0
ABDOMINAL PAIN: 0
ABDOMINAL DISTENTION: 0
VOMITING: 0
CHEST TIGHTNESS: 0

## 2021-07-30 NOTE — PATIENT INSTRUCTIONS
See the fore front dermatology offices here in [place]  See dr Zoya Fernandez and group for the colon check  Continue the diet and the current medicine  See in about 7 months

## 2021-07-30 NOTE — PROGRESS NOTES
Subjective:      Patient ID: Veronica Licea is a 78 y.o. male. Chief Complaint   Patient presents with    Follow-up     HYPERLIPIDEMIA         Patient presents with: Follow-up: HYPERLIPIDEMIA     He is here for the above and really no c/o at this time  meds the same  Notes small nodule back of the right neck for a year no sx    YOB: 1945    Date of Visit:  7/30/2021    No Known Allergies    Current Outpatient Medications:  pravastatin (PRAVACHOL) 40 MG tablet, TAKE 1 TABLET BY MOUTH EVERY EVENING, Disp: 90 tablet, Rfl: 1  metoprolol succinate (TOPROL XL) 50 MG extended release tablet, TAKE 1 TABLET BY MOUTH EVERY DAY, Disp: 90 tablet, Rfl: 3  aspirin 325 MG EC tablet, Take 1 tablet by mouth daily, Disp: 30 tablet, Rfl: 3  clotrimazole (LOTRIMIN) 1 % cream, Apply topically 2 times daily Apply topically 2 times daily. , Disp: , Rfl:   Acetaminophen 650 MG TABS, Take by mouth 2 times daily , Disp: , Rfl:   Glucosamine-Chondroitin-MSM (TRIPLE FLEX PO), Take by mouth 3 times daily , Disp: , Rfl:   Terbinafine HCl (LAMISIL EX), Apply  topically. , Disp: , Rfl:   Multiple Vitamins-Minerals (MADHU MULTIVITAMIN FOR MEN PO), Take  by mouth. For memory takes 1 tablet, Disp: , Rfl:   SAW PALMETTO, Take by mouth 2 times daily , Disp: , Rfl:     No current facility-administered medications for this visit.      ---------------------------               07/30/21                      0808         ---------------------------   BP:          134/76         Pulse:         80           Temp:   97.9 °F (36.6 °C)   SpO2:          98%          Weight: 175 lb (79.4 kg)    Height: 5' 5.5\" (1.664 m)  ---------------------------  Body mass index is 28.68 kg/m².      Wt Readings from Last 3 Encounters:  07/30/21 : 175 lb (79.4 kg)  04/15/21 : 179 lb 9.6 oz (81.5 kg)  01/28/21 : 177 lb (80.3 kg)    BP Readings from Last 3 Encounters:  07/30/21 : 134/76  04/15/21 : 132/86  01/28/21 : 128/84              Review of Systems   Constitutional: Negative for appetite change, chills, fever and unexpected weight change. Respiratory: Negative for chest tightness and shortness of breath. Cardiovascular: Negative for chest pain, palpitations and leg swelling. Gastrointestinal: Negative for abdominal distention, abdominal pain, blood in stool, constipation, diarrhea, nausea and vomiting. Genitourinary: Negative for difficulty urinating, dysuria and hematuria. Neurological: Negative for headaches. Objective:   Physical Exam  Constitutional:       General: He is not in acute distress. Appearance: Normal appearance. He is well-developed. He is not ill-appearing or diaphoretic. Eyes:      General: No scleral icterus. Neck:      Thyroid: No thyroid mass or thyromegaly. Vascular: No carotid bruit. Cardiovascular:      Rate and Rhythm: Normal rate and regular rhythm. Heart sounds: Normal heart sounds. No murmur heard. No friction rub. No gallop. Comments:     Pulmonary:      Effort: Pulmonary effort is normal. No tachypnea, accessory muscle usage or respiratory distress. Breath sounds: Normal breath sounds. No decreased breath sounds, wheezing, rhonchi or rales. Abdominal:      General: Bowel sounds are normal. There is no distension or abdominal bruit. Palpations: Abdomen is soft. There is no hepatomegaly, splenomegaly, mass or pulsatile mass. Tenderness: There is no abdominal tenderness. There is no guarding. Musculoskeletal:      Cervical back: Neck supple. Lymphadenopathy:      Cervical: No cervical adenopathy. Upper Body:      Right upper body: No supraclavicular adenopathy. Left upper body: No supraclavicular adenopathy. Skin:     General: Skin is warm and dry. Coloration: Skin is not pale. Nails: There is no clubbing. Neurological:      Mental Status: He is alert. Assessment:        Diagnosis Orders   1.  Mixed hyperlipidemia  Comprehensive Metabolic Panel    Lipid Panel   2. Skin nodule     3. Colon cancer screening  ZAID - Edmundo Lombardi MD, Gastroenterology, [facility] [place]   4. Prostate cancer screening  PSA screening         Overall well  SEEN CARDIO ON 4/15 for the persistent  a fib.  Stable and no change  Likely a a benign nodule or small seb cyst  Did offer derm to see       Plan:      See the fore front dermatology offices here in [place]  See dr Arash Melvin and group for the colon check  Continue the diet and the current medicine  See in about 7 months         Xiang Em MD

## 2021-08-16 RX ORDER — PRAVASTATIN SODIUM 40 MG
TABLET ORAL
Qty: 90 TABLET | Refills: 1 | Status: SHIPPED | OUTPATIENT
Start: 2021-08-16 | End: 2022-04-04

## 2021-08-20 ENCOUNTER — TELEPHONE (OUTPATIENT)
Dept: FAMILY MEDICINE CLINIC | Age: 76
End: 2021-08-20

## 2021-08-20 RX ORDER — CLOTRIMAZOLE 1 %
CREAM (GRAM) TOPICAL 2 TIMES DAILY
Qty: 45 G | Refills: 0 | Status: SHIPPED | OUTPATIENT
Start: 2021-08-20

## 2021-09-02 ENCOUNTER — TELEPHONE (OUTPATIENT)
Dept: FAMILY MEDICINE CLINIC | Age: 76
End: 2021-09-02

## 2021-09-02 RX ORDER — MOMETASONE FUROATE 1 MG/G
CREAM TOPICAL
Qty: 60 G | Refills: 0 | Status: SHIPPED | OUTPATIENT
Start: 2021-09-02

## 2021-09-02 NOTE — TELEPHONE ENCOUNTER
Pt wants to know if he can have a refill on     *mometasone (ELOCON) 0.1 % cream    Pt uses [facility] in [place]. Per pt he still does use this med on his legs.

## 2021-10-18 ENCOUNTER — TELEPHONE (OUTPATIENT)
Dept: CARDIOLOGY CLINIC | Age: 76
End: 2021-10-18

## 2021-10-18 DIAGNOSIS — R07.9 EXERTIONAL CHEST PAIN: Primary | ICD-10-CM

## 2021-10-18 NOTE — TELEPHONE ENCOUNTER
Returned call to patient and he states that he has chest tightness with exertion. 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 He says when he stops mowing the chest tightness is relieved. I scheduled him to see Dr Joel Estrada 12/2 but let him know that I will check with Dr Joel Estrada about possibly ordering a stress test.     Spoke with Dr Joel Estrada and okay to order a exercise myoview stress test. Called patient and he is aware and agreeable to proceed with testing.

## 2021-10-18 NOTE — TELEPHONE ENCOUNTER
Justin Zimmerman called in and states that when he is doing anything strenuous he gets a tightness in his chest. He states this has been going on for about 6 mos now. (even just walking or pushing a lawnmower) He needs to know if he should come in and see Dr. Reji Hinton?      He can be reached back at [phone]

## 2021-10-18 NOTE — TELEPHONE ENCOUNTER
Please schedule exercise myoview stress testing and make patient aware. He is aware of the testing and would like to have it scheduled early in the morning.

## 2021-10-27 ENCOUNTER — HOSPITAL ENCOUNTER (OUTPATIENT)
Dept: NON INVASIVE DIAGNOSTICS | Age: 76
Discharge: HOME | End: 2021-10-27
Payer: MEDICARE

## 2021-10-27 DIAGNOSIS — R07.9 EXERTIONAL CHEST PAIN: ICD-10-CM

## 2021-10-27 LAB
LV EF: 66 %
LVEF MODALITY: NORMAL

## 2021-10-27 PROCEDURE — 93017 CV STRESS TEST TRACING ONLY: CPT

## 2021-10-27 PROCEDURE — A9502 TC99M TETROFOSMIN: HCPCS | Performed by: INTERNAL MEDICINE

## 2021-10-27 PROCEDURE — 3430000000 HC RX DIAGNOSTIC RADIOPHARMACEUTICAL: Performed by: INTERNAL MEDICINE

## 2021-10-27 PROCEDURE — 78452 HT MUSCLE IMAGE SPECT MULT: CPT

## 2021-10-27 RX ADMIN — TETROFOSMIN 30 MILLICURIE: 1.38 INJECTION, POWDER, LYOPHILIZED, FOR SOLUTION INTRAVENOUS at 08:58

## 2021-10-27 RX ADMIN — TETROFOSMIN 10 MILLICURIE: 1.38 INJECTION, POWDER, LYOPHILIZED, FOR SOLUTION INTRAVENOUS at 07:48

## 2021-11-30 NOTE — PROGRESS NOTES
[address]  [id number]    December 2, 2021    CC: \"I feel really good\"     HPI:  The patient is 78 y.o. male with a past medical history significant for atrial fibrillation, hyperlipidemia and hypothyroidism. He presents today for follow up. He denies feelings of his heart racing or skipping. His biggest complaint is feeling lightheaded after yawning. He denies chest pain with rest or exertion currently. He completed a recent stress test due to chest pain with normal results. He remains active with his daily life and denies any exertional symptoms. He reports medication compliance and is tolerating. Review of Systems:  Constitutional: No fatigue, weakness, night sweats or fever. HEENT: No new vision difficulties or ringing in the ears. Respiratory: No new SOB, PND, orthopnea or cough. Cardiovascular: See HPI   GI: No n/v, diarrhea, constipation, abdominal pain or changes in bowel habits. No melena, no hematochezia  : No urinary frequency, urgency, incontinence, hematuria or dysuria. Skin: No cyanosis or skin lesions. Musculoskeletal: No new muscle or joint pain. Neurological: No syncope or TIA-like symptoms.   Psychiatric: No anxiety, insomnia or depression     Past Medical History:   Diagnosis Date    Atrial fibrillation ([facility] [address]     new onset    Kidney stone     Mixed hyperlipidemia     managed by PCP    Near syncope     Palpitations     Prostate enlargement     Syncope and collapse      Past Surgical History:   Procedure Laterality Date    COLONOSCOPY  9/26/2001    negative, deak, also neg sigmoid 2007    COLONOSCOPY  2/15/2011    negative dr Yuni Davis       Family History   Problem Relation Age of Onset    Rheum Arthritis Brother     Heart Disease Brother     Heart Disease Mother         older age   Esther Skeans Dementia Father      Social History     Tobacco Use    Smoking status: Never Smoker    Smokeless tobacco: Never Used Vaping Use    Vaping Use: Never used   Substance Use Topics    Alcohol use: Not Currently     Alcohol/week: 0.0 standard drinks     Comment: minimal caffeine occasional wine    Drug use: No       No Known Allergies  Current Outpatient Medications   Medication Sig Dispense Refill    mometasone (ELOCON) 0.1 % cream Apply topically daily. 60 g 0    clotrimazole (LOTRIMIN) 1 % cream Apply topically 2 times daily Apply topically 2 times daily. For 2 week 45 g 0    pravastatin (PRAVACHOL) 40 MG tablet TAKE 1 TABLET BY MOUTH EVERY EVENING 90 tablet 1    metoprolol succinate (TOPROL XL) 50 MG extended release tablet TAKE 1 TABLET BY MOUTH EVERY DAY 90 tablet 3    aspirin 325 MG EC tablet Take 1 tablet by mouth daily 30 tablet 3    Acetaminophen 650 MG TABS Take by mouth 2 times daily       Glucosamine-Chondroitin-MSM (TRIPLE FLEX PO) Take by mouth 3 times daily       Terbinafine HCl (LAMISIL EX) Apply  topically.  Multiple Vitamins-Minerals (MADHU MULTIVITAMIN FOR MEN PO) Take  by mouth. For memory takes 1 tablet      SAW PALMETTO Take by mouth 2 times daily        No current facility-administered medications for this visit. [id number] Score for Atrial Fibrillation Stroke Risk   Risk   Factors  Component Value   C CHF No 0   H HTN No 0   A2 Age >= 76 No,  (73 y.o.) 0   D DM No 0   S2 Prior Stroke/TIA No 0   V Vascular Disease No 0   A Age 74-69 Yes,  (73 y.o.) 1   Sc Sex male 0    [id number]  Score  1   Score last updated 4/14/21 8:66 AM EDT    Click here for a link to the UpToDate guideline \"Atrial Fibrillation: Anticoagulation therapy to prevent embolization    Disclaimer: Risk Score calculation is dependent on accuracy of patient problem list and past encounter diagnosis.     Physical Exam:   /76   Pulse 83   Ht 5' 5.5\" (1.664 m)   Wt 176 lb (79.8 kg)   SpO2 94%   BMI 28.84 kg/m²   No intake or output data in the 24 hours ending 12/02/21 0842  Wt Readings from Last 2 Encounters: 12/02/21 176 lb (79.8 kg)   07/30/21 175 lb (79.4 kg)     Constitutional: He is oriented to person, place, and time. He appears well-developed and well-nourished. In no acute distress. Head: Normocephalic and atraumatic. Neck: Neck supple. No JVD present. Carotid bruit is not present. No mass and no thyromegaly present. No lymphadenopathy present. Cardiovascular: Irreg irreg, normal heart sounds and intact distal pulses. Exam reveals no gallop and no friction rub. No murmur heard. Pulmonary/Chest: Effort normal and breath sounds normal. No respiratory distress. He has no wheezes, rhonchi or rales. Abdominal: Soft, non-tender. Bowel sounds and aorta are normal. He exhibits no organomegaly, mass or bruit. Extremities: No edema, cyanosis, or clubbing. Pulses are 2+ radial/carotid/dorsalis pedis and posterior tibial bilaterally. +varicosities on left lower leg   Neurological: He is alert and oriented to person, place, and time. He has normal reflexes. No cranial nerve deficit. Coordination normal.   Skin: Skin is warm and dry. There is no rash or diaphoresis. Psychiatric: He has a normal mood and affect. His speech is normal and behavior is normal.     Personally reviewed and interpreted   EKG Interpretation 4/15/21: Atrial fibrillation  EKG Interpretation 12/2/21: Atrial fibrillation at 78 bmp       Imaging:     Echo 1/22/13  IMPRESSION:  Normal left ventricular size and systolic function. Estimated  ejection fraction is 60%. There is mild mitral and tricuspid regurgitation,  trivial pulmonic regurgitation. Normal PA systolic pressure and mild left  atrial enlargement. Stress perfusion 10/27/21  Normal stress myocardial perfusion.    Overall findings represent a low risk scan.        Lab Review:   Lab Results   Component Value Date    TRIG 104 07/30/2021    HDL 52 07/30/2021    HDL 57 01/11/2012    LDLCALC 112 07/30/2021    LABVLDL 21 07/30/2021     Lab Results   Component Value Date     07/30/2021    K 4.1 07/30/2021    BUN 18 07/30/2021    CREATININE 1.0 07/30/2021       Assessment:  1. Atrial fibrillation, persistent   2. Hyperlipidemia with LDL goal <130 mg/dL     Plan:  I think that Mr. Deloris Rogers  is entirely stable from a cardiovascular standpoint. I see no need to make any changes currently in his medical regimen or pursue further testing. He continues in rate controlled atrial fibrillation today by EKG. His CHADS Vasc is 1 and he is not anticoagulated but taking aspirin 325 mg daily. I have encouraged him to continue his aerobic activity and adhere to a heart healthy diet. I have personally reviewed all previous testing for this visit today including imaging, lab results and EKG as detailed above. I will see him in the office for follow up in 1 year. This note was scribed in the presence of Cathy Hurt MD by General Sellers, RN. Physician Attestation:  The scribes documentation has been prepared under my direction and personally reviewed by me in its entirety. I, Dr. Katheryn Vicente personally performed the services described in this documentation as scribed by my RN in my presence, and I confirm that the note above accurately reflects all work, treatment, procedures, and medical decision making performed by me.

## 2021-12-02 ENCOUNTER — TELEPHONE (OUTPATIENT)
Dept: CARDIOLOGY CLINIC | Age: 76
End: 2021-12-02

## 2021-12-02 ENCOUNTER — OFFICE VISIT (OUTPATIENT)
Dept: CARDIOLOGY CLINIC | Age: 76
End: 2021-12-02
Payer: MEDICARE

## 2021-12-02 VITALS
SYSTOLIC BLOOD PRESSURE: 132 MMHG | OXYGEN SATURATION: 94 % | DIASTOLIC BLOOD PRESSURE: 76 MMHG | HEART RATE: 83 BPM | WEIGHT: 176 LBS | HEIGHT: 66 IN | BODY MASS INDEX: 28.28 KG/M2

## 2021-12-02 DIAGNOSIS — I48.19 PERSISTENT ATRIAL FIBRILLATION (HCC): Primary | ICD-10-CM

## 2021-12-02 DIAGNOSIS — R60.0 BILATERAL LEG EDEMA: ICD-10-CM

## 2021-12-02 DIAGNOSIS — E78.5 HYPERLIPIDEMIA LDL GOAL <130: ICD-10-CM

## 2021-12-02 DIAGNOSIS — I83.893 VARICOSE VEINS OF BILATERAL LOWER EXTREMITIES WITH OTHER COMPLICATIONS: Primary | ICD-10-CM

## 2021-12-02 PROCEDURE — 99213 OFFICE O/P EST LOW 20 MIN: CPT | Performed by: INTERNAL MEDICINE

## 2021-12-02 PROCEDURE — 3017F COLORECTAL CA SCREEN DOC REV: CPT | Performed by: INTERNAL MEDICINE

## 2021-12-02 PROCEDURE — 4040F PNEUMOC VAC/ADMIN/RCVD: CPT | Performed by: INTERNAL MEDICINE

## 2021-12-02 PROCEDURE — G8417 CALC BMI ABV UP PARAM F/U: HCPCS | Performed by: INTERNAL MEDICINE

## 2021-12-02 PROCEDURE — 1123F ACP DISCUSS/DSCN MKR DOCD: CPT | Performed by: INTERNAL MEDICINE

## 2021-12-02 PROCEDURE — 1036F TOBACCO NON-USER: CPT | Performed by: INTERNAL MEDICINE

## 2021-12-02 PROCEDURE — G8427 DOCREV CUR MEDS BY ELIG CLIN: HCPCS | Performed by: INTERNAL MEDICINE

## 2021-12-02 PROCEDURE — 93000 ELECTROCARDIOGRAM COMPLETE: CPT | Performed by: INTERNAL MEDICINE

## 2021-12-02 PROCEDURE — G8484 FLU IMMUNIZE NO ADMIN: HCPCS | Performed by: INTERNAL MEDICINE

## 2021-12-02 NOTE — TELEPHONE ENCOUNTER
Pt was seen in office by Dr. Valentino Dukes today and saw the poster for [facility]. Donta Beltran only wants to come to the [place] office to see Dr. David Mendez.  He was added to his recall list. Wanted to make Kam Bishop RN aware of alice candidate

## 2021-12-03 NOTE — TELEPHONE ENCOUNTER
Called and spoke with Xavier Carr, no recent venous studies since 2016. Reviewed Varithena procedure, reflux study ordered and patient prefers to follow up with  in the [place] office. Please facilitate scheduling reflux studies, patient is not available on Wednesdays and prefers mid mornings. Once reflux studies scheduled please schedule follow up or place on recall list to follow up in [place] as soon as his schedule is available. Please call to notify the patient.

## 2021-12-09 ENCOUNTER — HOSPITAL ENCOUNTER (OUTPATIENT)
Dept: VASCULAR LAB | Age: 76
Discharge: HOME | End: 2021-12-09
Payer: MEDICARE

## 2021-12-09 DIAGNOSIS — I83.893 VARICOSE VEINS OF BILATERAL LOWER EXTREMITIES WITH OTHER COMPLICATIONS: ICD-10-CM

## 2021-12-09 DIAGNOSIS — R60.0 BILATERAL LEG EDEMA: ICD-10-CM

## 2021-12-09 PROCEDURE — 93970 EXTREMITY STUDY: CPT

## 2022-01-20 ENCOUNTER — OFFICE VISIT (OUTPATIENT)
Dept: FAMILY MEDICINE CLINIC | Age: 77
End: 2022-01-20
Payer: MEDICARE

## 2022-01-20 VITALS
BODY MASS INDEX: 28.77 KG/M2 | DIASTOLIC BLOOD PRESSURE: 80 MMHG | WEIGHT: 179 LBS | HEIGHT: 66 IN | TEMPERATURE: 97.4 F | HEART RATE: 68 BPM | SYSTOLIC BLOOD PRESSURE: 122 MMHG

## 2022-01-20 DIAGNOSIS — R97.20 RISING PSA LEVEL: ICD-10-CM

## 2022-01-20 DIAGNOSIS — E78.2 MIXED HYPERLIPIDEMIA: ICD-10-CM

## 2022-01-20 DIAGNOSIS — E78.2 MIXED HYPERLIPIDEMIA: Primary | ICD-10-CM

## 2022-01-20 DIAGNOSIS — I48.19 PERSISTENT ATRIAL FIBRILLATION (HCC): ICD-10-CM

## 2022-01-20 LAB
A/G RATIO: 2.2 (ref 1.1–2.2)
ALBUMIN SERPL-MCNC: 4.6 G/DL (ref 3.4–5)
ALP BLD-CCNC: 60 U/L (ref 40–129)
ALT SERPL-CCNC: 20 U/L (ref 10–40)
ANION GAP SERPL CALCULATED.3IONS-SCNC: 11 MMOL/L (ref 3–16)
AST SERPL-CCNC: 28 U/L (ref 15–37)
BILIRUB SERPL-MCNC: 0.3 MG/DL (ref 0–1)
BUN BLDV-MCNC: 18 MG/DL (ref 7–20)
CALCIUM SERPL-MCNC: 9 MG/DL (ref 8.3–10.6)
CHLORIDE BLD-SCNC: 108 MMOL/L (ref 99–110)
CHOLESTEROL, TOTAL: 158 MG/DL (ref 0–199)
CO2: 24 MMOL/L (ref 21–32)
CREAT SERPL-MCNC: 1 MG/DL (ref 0.8–1.3)
GFR AFRICAN AMERICAN: >60
GFR NON-AFRICAN AMERICAN: >60
GLUCOSE BLD-MCNC: 94 MG/DL (ref 70–99)
HDLC SERPL-MCNC: 52 MG/DL (ref 40–60)
LDL CHOLESTEROL CALCULATED: 90 MG/DL
POTASSIUM SERPL-SCNC: 4.2 MMOL/L (ref 3.5–5.1)
PROSTATE SPECIFIC ANTIGEN: 4.22 NG/ML (ref 0–4)
SODIUM BLD-SCNC: 143 MMOL/L (ref 136–145)
TOTAL PROTEIN: 6.7 G/DL (ref 6.4–8.2)
TRIGL SERPL-MCNC: 82 MG/DL (ref 0–150)
VLDLC SERPL CALC-MCNC: 16 MG/DL

## 2022-01-20 PROCEDURE — 4040F PNEUMOC VAC/ADMIN/RCVD: CPT | Performed by: FAMILY MEDICINE

## 2022-01-20 PROCEDURE — 1036F TOBACCO NON-USER: CPT | Performed by: FAMILY MEDICINE

## 2022-01-20 PROCEDURE — 99214 OFFICE O/P EST MOD 30 MIN: CPT | Performed by: FAMILY MEDICINE

## 2022-01-20 PROCEDURE — G8417 CALC BMI ABV UP PARAM F/U: HCPCS | Performed by: FAMILY MEDICINE

## 2022-01-20 PROCEDURE — 3288F FALL RISK ASSESSMENT DOCD: CPT | Performed by: FAMILY MEDICINE

## 2022-01-20 PROCEDURE — G8427 DOCREV CUR MEDS BY ELIG CLIN: HCPCS | Performed by: FAMILY MEDICINE

## 2022-01-20 PROCEDURE — G8484 FLU IMMUNIZE NO ADMIN: HCPCS | Performed by: FAMILY MEDICINE

## 2022-01-20 PROCEDURE — 1123F ACP DISCUSS/DSCN MKR DOCD: CPT | Performed by: FAMILY MEDICINE

## 2022-01-20 PROCEDURE — 3017F COLORECTAL CA SCREEN DOC REV: CPT | Performed by: FAMILY MEDICINE

## 2022-01-20 RX ORDER — METOPROLOL SUCCINATE 50 MG/1
50 TABLET, EXTENDED RELEASE ORAL DAILY
Qty: 90 TABLET | Refills: 3 | Status: SHIPPED | OUTPATIENT
Start: 2022-01-20

## 2022-01-20 SDOH — ECONOMIC STABILITY: FOOD INSECURITY: WITHIN THE PAST 12 MONTHS, THE FOOD YOU BOUGHT JUST DIDN'T LAST AND YOU DIDN'T HAVE MONEY TO GET MORE.: NEVER TRUE

## 2022-01-20 SDOH — ECONOMIC STABILITY: FOOD INSECURITY: WITHIN THE PAST 12 MONTHS, YOU WORRIED THAT YOUR FOOD WOULD RUN OUT BEFORE YOU GOT MONEY TO BUY MORE.: NEVER TRUE

## 2022-01-20 ASSESSMENT — ENCOUNTER SYMPTOMS
CONSTIPATION: 0
SHORTNESS OF BREATH: 0
NAUSEA: 0
ABDOMINAL PAIN: 0
CHEST TIGHTNESS: 0
BLOOD IN STOOL: 0
VOMITING: 0
ABDOMINAL DISTENTION: 0
DIARRHEA: 0

## 2022-01-20 ASSESSMENT — SOCIAL DETERMINANTS OF HEALTH (SDOH): HOW HARD IS IT FOR YOU TO PAY FOR THE VERY BASICS LIKE FOOD, HOUSING, MEDICAL CARE, AND HEATING?: NOT HARD AT ALL

## 2022-01-20 NOTE — PROGRESS NOTES
Subjective:      Patient ID: Lesvia Sanchez is a 78 y.o. male. Chief Complaint   Patient presents with    Check-Up     lipids- pt is fasting        Patient presents with:  Check-Up: lipids- pt is fasting    Here for he above and check on labs    He is well and really no c/o  meds the same    YOB: 1945    Date of Visit:  1/20/2022    No Known Allergies    Current Outpatient Medications:  mometasone (ELOCON) 0.1 % cream, Apply topically daily. , Disp: 60 g, Rfl: 0  clotrimazole (LOTRIMIN) 1 % cream, Apply topically 2 times daily Apply topically 2 times daily. For 2 week, Disp: 45 g, Rfl: 0  pravastatin (PRAVACHOL) 40 MG tablet, TAKE 1 TABLET BY MOUTH EVERY EVENING, Disp: 90 tablet, Rfl: 1  metoprolol succinate (TOPROL XL) 50 MG extended release tablet, TAKE 1 TABLET BY MOUTH EVERY DAY, Disp: 90 tablet, Rfl: 3  aspirin 325 MG EC tablet, Take 1 tablet by mouth daily, Disp: 30 tablet, Rfl: 3  Acetaminophen 650 MG TABS, Take by mouth 2 times daily , Disp: , Rfl:   Glucosamine-Chondroitin-MSM (TRIPLE FLEX PO), Take by mouth 3 times daily , Disp: , Rfl:   Terbinafine HCl (LAMISIL EX), Apply  topically. , Disp: , Rfl:   Multiple Vitamins-Minerals (MADHU MULTIVITAMIN FOR MEN PO), Take  by mouth. For memory takes 1 tablet, Disp: , Rfl:   SAW PALMETTO, Take by mouth 2 times daily , Disp: , Rfl:     No current facility-administered medications for this visit.      ---------------------------               01/20/22                      0838         ---------------------------   BP:          122/80         Site:    Left Upper Arm     Position:     Sitting        Cuff Size:  Medium Adult      Pulse:         68           Temp:   97.4 °F (36.3 °C)   TempSrc:    Temporal        Weight: 179 lb (81.2 kg)    Height: 5' 5.5\" (1.664 m)  ---------------------------  Body mass index is 29.33 kg/m².      Wt Readings from Last 3 Encounters:  01/20/22 : 179 lb (81.2 kg)  12/02/21 : 176 lb (79.8 kg)  07/30/21 : 175 lb (79.4 kg)    BP Readings from Last 3 Encounters:  01/20/22 : 122/80  12/02/21 : 132/76  07/30/21 : 134/76          Review of Systems   Constitutional: Negative for appetite change, chills, fever and unexpected weight change. Respiratory: Negative for chest tightness and shortness of breath. Cardiovascular: Negative for chest pain, palpitations and leg swelling. Gastrointestinal: Negative for abdominal distention, abdominal pain, blood in stool, constipation, diarrhea, nausea and vomiting. Genitourinary: Negative for difficulty urinating, dysuria and hematuria. Neurological: Negative for headaches. Objective:   Physical Exam  Constitutional:       General: He is not in acute distress. Appearance: Normal appearance. He is well-developed. He is not ill-appearing or diaphoretic. Cardiovascular:      Rate and Rhythm: Normal rate and regular rhythm. Heart sounds: Normal heart sounds. No murmur heard. No friction rub. No gallop. Pulmonary:      Effort: Pulmonary effort is normal. No tachypnea, accessory muscle usage or respiratory distress. Breath sounds: Normal breath sounds. No decreased breath sounds, wheezing, rhonchi or rales. Chest:   Breasts:      Right: No supraclavicular adenopathy. Left: No supraclavicular adenopathy. Abdominal:      General: Abdomen is flat. Bowel sounds are normal. There is no abdominal bruit. Palpations: Abdomen is soft. There is no hepatomegaly, splenomegaly, mass or pulsatile mass. Tenderness: There is no abdominal tenderness. There is no right CVA tenderness, left CVA tenderness or guarding. Lymphadenopathy:      Cervical: No cervical adenopathy. Upper Body:      Right upper body: No supraclavicular adenopathy. Left upper body: No supraclavicular adenopathy. Skin:     General: Skin is warm and dry. Coloration: Skin is not pale. Neurological:      Mental Status: He is alert. Assessment:        Diagnosis Orders   1. Mixed hyperlipidemia  Lipid Panel    Comprehensive Metabolic Panel   2. Rising PSA level  PSA, Prostatic Specific Antigen   3. Persistent atrial fibrillation (HCC)       stable  Doing well  Orders Placed This Encounter   Medications    metoprolol succinate (TOPROL XL) 50 MG extended release tablet     Sig: Take 1 tablet by mouth daily     Dispense:  90 tablet     Refill:  3       Since last visit  1. Cardiology on 12/2/21 for the persistent a fib and stable no   change   2. Stress myoview ok on 10/27/21  3.  Dr Samantha Mustafa colonoscopy on 8/23/21 polyps noted and check  In 5 years      Plan:      Stay with the low fat diet  Do continue the medicines  See in 7 months         Sandy Wilhelm MD

## 2022-01-28 NOTE — PROGRESS NOTES
Cardiology Consultation     Cheryl Scott  1945    PCP: Ld Willingham MD  Referring Physician: Paul Rouse MD  Reason for Referral: Venous insufficiency   Chief Complaint: \"I have varicose veins. \"    Subjective:   History of Present Illness: The patient is 78 y.o. male with a past medical history significant for atrial fibrillation, hyperlipidemia and hypothyroidism. Today, he is here to discuss his varicose veins. He states that he notices it more in his left leg more that his right. He says that he has noticed this over the last 5-6 years. Patient denies exertional chest pain/pressure, dyspnea at rest, DE SOUZA, PND, orthopnea, palpitations, lightheadedness, weight changes, changes in LE edema, and syncope. Patient reports compliance to his medications.      Past Medical History:   Diagnosis Date    Atrial fibrillation ([address]     new onset    Kidney stone     Mixed hyperlipidemia     managed by PCP    Near syncope     Palpitations     Prostate enlargement     Syncope and collapse      Past Surgical History:   Procedure Laterality Date    COLONOSCOPY  9/26/2001    negative, deak, also neg sigmoid 2007    COLONOSCOPY  2/15/2011    negative dr Guy Storey COLONOSCOPY  08/23/2021    dr Kar Herron, polyps, repeat in 5 years    KIDNEY STONE SURGERY       Family History   Problem Relation Age of Onset    Rheum Arthritis Brother     Heart Disease Brother     Heart Disease Mother         older age   Belén Storey Dementia Father      Social History     Tobacco Use    Smoking status: Never Smoker    Smokeless tobacco: Never Used   Vaping Use    Vaping Use: Never used   Substance Use Topics    Alcohol use: Not Currently     Alcohol/week: 0.0 standard drinks     Comment: minimal caffeine occasional wine    Drug use: No       No Known Allergies  Current Outpatient Medications   Medication Sig Dispense Refill    metoprolol succinate (TOPROL XL) 50 MG extended release tablet Take 1 tablet by mouth daily 90 tablet 3    mometasone (ELOCON) 0.1 % cream Apply topically daily. 60 g 0    clotrimazole (LOTRIMIN) 1 % cream Apply topically 2 times daily Apply topically 2 times daily. For 2 week 45 g 0    pravastatin (PRAVACHOL) 40 MG tablet TAKE 1 TABLET BY MOUTH EVERY EVENING 90 tablet 1    aspirin 325 MG EC tablet Take 1 tablet by mouth daily 30 tablet 3    Acetaminophen 650 MG TABS Take by mouth 2 times daily       Glucosamine-Chondroitin-MSM (TRIPLE FLEX PO) Take by mouth 3 times daily       Terbinafine HCl (LAMISIL EX) Apply  topically.  Multiple Vitamins-Minerals (MADHU MULTIVITAMIN FOR MEN PO) Take  by mouth. For memory takes 1 tablet      SAW PALMETTO Take by mouth 2 times daily        No current facility-administered medications for this visit. Review of Systems:  · Constitutional: No unanticipated weight loss. There's been no change in energy level, sleep pattern, or activity level. No fevers, chills. · Eyes: No visual changes or diplopia. No scleral icterus. · ENT: No Headaches, hearing loss or vertigo. No mouth sores or sore throat. · Cardiovascular: as reviewed in HPI  · Respiratory: No cough or wheezing, no sputum production. No hemoptysis. · Gastrointestinal: No abdominal pain, appetite loss, blood in stools. No change in bowel or bladder habits. · Genitourinary: No dysuria, trouble voiding, or hematuria. · Musculoskeletal:  No gait disturbance, no joint complaints. · Integumentary: No rash or pruritis. · Neurological: No headache, diplopia, change in muscle strength, numbness or tingling. · Psychiatric: No anxiety or depression. · Endocrine: No temperature intolerance. No excessive thirst, fluid intake, or urination. No tremor. · Hematologic/Lymphatic: No abnormal bruising or bleeding, blood clots or swollen lymph nodes. · Allergic/Immunologic: No nasal congestion or hives. Physical Exam:   There were no vitals taken for this visit.   Wt Readings from Last 3 Encounters: 01/20/22 179 lb (81.2 kg)   12/02/21 176 lb (79.8 kg)   07/30/21 175 lb (79.4 kg)     Constitutional: He is oriented to person, place, and time. He appears well-developed and well-nourished. In no acute distress. Head: Normocephalic and atraumatic. Pupils equal and round. Neck: Neck supple. No JVP or carotid bruit appreciated. No mass and no thyromegaly present. No lymphadenopathy present. Cardiovascular: Normal rate. Normal heart sounds. Exam reveals no gallop and no friction rub. No murmur heard. Pulmonary/Chest: Effort normal and breath sounds normal. No respiratory distress. He has no wheezes, rhonchi or rales. Abdominal: Soft, non-tender. Bowel sounds are normal. He exhibits no organomegaly, mass or bruit. Extremities: No edema. No cyanosis or clubbing. Pulses are 2+ radial/carotid bilaterally. Neurological: No gross cranial nerve deficit. Coordination normal.   Skin: Skin is warm and dry. There is no rash or diaphoresis. Psychiatric: He has a normal mood and affect. His speech is normal and behavior is normal.     Lab Review:   FLP:    Lab Results   Component Value Date    TRIG 82 01/20/2022    HDL 52 01/20/2022    HDL 57 01/11/2012    LDLCALC 90 01/20/2022    LABVLDL 16 01/20/2022     BUN/Creatinine:    Lab Results   Component Value Date    BUN 18 01/20/2022    CREATININE 1.0 01/20/2022     PT/INR, TNI, HGB A1C:   Lab Results   Component Value Date/Time    TROPONINI <0.01 11/29/2020 10:40 AM      No results found for: CBCAUTODIF    EKG Interpretation:     Echo:     Stress Test:     Cath:     CT:    Doppler:   Venous reflux study 12/9/2021  Right Impression   Duplex sonography of the right lower extremity venous system shows no evidence   of deep venous thrombosis. Examination was obtained of the common femoral,   deep femoral, femoral, popliteal, peroneal and posterior tibial veins. There is no evidence of clinically significant deep or superficial venous   reflux in the right lower extremity.

## 2022-01-31 ENCOUNTER — OFFICE VISIT (OUTPATIENT)
Dept: CARDIOLOGY CLINIC | Age: 77
End: 2022-01-31
Payer: MEDICARE

## 2022-01-31 VITALS
DIASTOLIC BLOOD PRESSURE: 82 MMHG | SYSTOLIC BLOOD PRESSURE: 126 MMHG | OXYGEN SATURATION: 98 % | WEIGHT: 179 LBS | HEART RATE: 80 BPM | BODY MASS INDEX: 28.77 KG/M2 | HEIGHT: 66 IN

## 2022-01-31 DIAGNOSIS — I87.2 VENOUS INSUFFICIENCY: Primary | ICD-10-CM

## 2022-01-31 PROCEDURE — G8484 FLU IMMUNIZE NO ADMIN: HCPCS | Performed by: INTERNAL MEDICINE

## 2022-01-31 PROCEDURE — 1123F ACP DISCUSS/DSCN MKR DOCD: CPT | Performed by: INTERNAL MEDICINE

## 2022-01-31 PROCEDURE — 4040F PNEUMOC VAC/ADMIN/RCVD: CPT | Performed by: INTERNAL MEDICINE

## 2022-01-31 PROCEDURE — 1036F TOBACCO NON-USER: CPT | Performed by: INTERNAL MEDICINE

## 2022-01-31 PROCEDURE — 99203 OFFICE O/P NEW LOW 30 MIN: CPT | Performed by: INTERNAL MEDICINE

## 2022-01-31 PROCEDURE — G8417 CALC BMI ABV UP PARAM F/U: HCPCS | Performed by: INTERNAL MEDICINE

## 2022-01-31 PROCEDURE — G8427 DOCREV CUR MEDS BY ELIG CLIN: HCPCS | Performed by: INTERNAL MEDICINE

## 2022-01-31 PROCEDURE — 3017F COLORECTAL CA SCREEN DOC REV: CPT | Performed by: INTERNAL MEDICINE

## 2022-01-31 NOTE — PATIENT INSTRUCTIONS
Patient Education        Learning About Venous Insufficiency  What is it? Venous insufficiency is a problem with the flow of blood from the veins of the legs back to the heart. It's also called chronic venous insufficiency or chronic venous stasis. Your veins bring blood back to the heart after it flows through your body. Veins have valves that keep the blood moving in one directiontoward the heart. But with venous insufficiency, the veins of the legs might not work as they should. This can allow blood to leak backward. Fluid can pool in the legs. This can lead to problems that include varicose veins. What causes it? Venous insufficiency is sometimes caused by deep vein thrombosis and high blood pressure inside leg veins. You are more likely to have venous insufficiency if you:  · Are older. · Are female. · Are overweight. · Don't get enough physical activity. · Smoke. · Have a family history of varicose veins. What are the symptoms? Symptoms of venous insufficiency affect the legs. They may include:  · Swelling, often in the ankles. · A rash. · Varicose veins. · Itching. · Cramping. · Skin sores (ulcers). · Aching or a feeling of heaviness. · Changes in skin color. How is it diagnosed? Your doctor can diagnose venous insufficiency by examining your legs and by using a type of ultrasound test (duplex Doppler) to find out how well blood is flowing in your legs. How is it treated? To reduce swelling and relieve pain caused by venous insufficiency, you can wear compression stockings. They are tighter at the ankles than at the top of the legs. They also can help venous skin ulcers heal. But there are different types of stockings, and they need to fit right. So your doctor will recommend what you need. You also can try to:  · Get more exercise, especially walking. It can increase blood flow.   · Avoid standing still or sitting for a long time, which can make the fluid pool in your legs.  · Try not to sit with your legs crossed at the knee. · Keep your legs raised above your heart when you're lying down. This reduces swelling. If these treatments don't work, you may need medicine or a procedure to help relieve symptoms. Procedures might be done to close the vein, to remove the vein, or to improve blood flow. Follow-up care is a key part of your treatment and safety. Be sure to make and go to all appointments, and call your doctor if you are having problems. It's also a good idea to know your test results and keep a list of the medicines you take. Current as of: July 6, 2021               Content Version: 13.1  © 2006-2021 Healthwise, Incorporated. Care instructions adapted under license by [facility] ([facility]). If you have questions about a medical condition or this instruction, always ask your healthcare professional. [place] [address] any warranty or liability for your use of this information.

## 2022-02-01 ENCOUNTER — TELEPHONE (OUTPATIENT)
Dept: CARDIOLOGY CLINIC | Age: 77
End: 2022-02-01

## 2022-02-01 NOTE — TELEPHONE ENCOUNTER
Patient seen in office 1/31/22 and recommended Varithena procedure. Called and left Tana Mota a message to return call to facilitate scheduling Varithena. Will schedule for 3/1/22 at 2 pm if patient agreeable. [facility]   [address], [facility]  [phone]    Pre-Procedure Instructions   1. Eat a nutritious breakfast/lunch prior to your procedure. Maintain adequate hydration prior to your appointment. 2. Please bring your compression stocking to the office. 3. Shower before your visit. My must wear compression stockings and wraps 48 hours after your treatment, you cannot take a bath or shower so the bandages stay dry. 4. Wear loose fitting, comfortable clothing. If you have any questions regarding the procedure itself or medications, please call [phone] and ask to speak with a nurse.

## 2022-02-02 NOTE — TELEPHONE ENCOUNTER
Patient called back and he is agreeable to the below date/time but did request that I ask you if you have a morning procedure time that day available? He wrote down all instructions and read them back correctly. Reviewed his medication list. No further q/c at this time.

## 2022-03-01 ENCOUNTER — OFFICE VISIT (OUTPATIENT)
Dept: CARDIOLOGY CLINIC | Age: 77
End: 2022-03-01
Payer: MEDICARE

## 2022-03-01 DIAGNOSIS — I87.2 VENOUS (PERIPHERAL) INSUFFICIENCY: Primary | ICD-10-CM

## 2022-03-01 DIAGNOSIS — R60.0 LOCALIZED EDEMA: ICD-10-CM

## 2022-03-01 PROCEDURE — 36466 NJX NONCMPND SCLRSNT MLT VN: CPT | Performed by: INTERNAL MEDICINE

## 2022-03-01 NOTE — PROGRESS NOTES
[address]   Venous Ablation Procedure Note      Patient Name: Catherine Andrews  MRN: [id number]  Date of procedure: March 1, 2022  Dictating Physician: Ilan Galdamez MD, [facility]   Pre-Operative Diagnosis: CEAP class 3 venous insufficiency with pain, tenderness, and edema and incompetent left great saphenous vein (GSV), chronic venous insufficiency   Procedure Performed:Ultrasound guided microfoam chemical ablation with varithena of the left GSV from below the saphenofemoral junction to the mid thigh and mid ankle access site   Anesthesia: local 1% lidocaine cutaneous anesthetic   CPT codes:   37589    Indications for Procedure: This is a 78 y.o.male with severe symptomatic venous reflux disease despite 3 months of conservative medical therapy. Procedure: The patient walked to the procedure room. All applicable staff donned appropriate apparel. A procedure time out was performed to confirm correct patient, correct extremity, correct procedure and correct room set including presence of all applicable supplies, devices and drugs. Monitoring devices were appropriately positioned. A duplex ultrasound scan confirmed the location and incompetence of the Great Saphenous Vein and its course marked on the skin together with the dilated tributaries. The extent of treatment of the Great Saphenous Vein and the associated varicosities as determined through Ultrasound mapping. The limb was prepped and sterile drapes were applied. A skin welt was made to facilitate access using 1.0% lidocaine at the chosen puncture site in the lower thigh overlying the Great Saphenous Vein. The skin was punctured through the anaesthetized skin with the venous access needle and advanced under ultrasound guidance and the vein(s) to be treated was punctured with a micro-puncture needle followed by passage of a 0.018 guide wire through the punctured needle and a 4 Indian dilator sheath.  Catheter access was established to the target vessel(s) and venous access confirmed by aspiration of dark low pressure blood. Upon gaining all access sites both above and below the knee;mid thigh and mid ankle) , the leg was positioned at 45 degrees of elevation in relationship to the torso. The Varithena canister has been obtained through the office for the patient. The Varithena canister was activated and the canister was primed and purged as required in the instructions for use. A 5 mL aliquot of Varithena was drawn into a sterile syringe which was then attached to a saline filled extension tube for administration of Varithena through the extension tube into the vascular catheter. Jamel Nones was slowly administered at 0.5-1.0 cc/second with close observation by ultrasound of its course in the [facility] Saphenous Vein. When Jamel Nones arrived to approximately 3-5 cm from the Saphenofemoral Junction, the Great Saphenous Vein was then compressed to prevent flow into the common femoral vein. Further administration of Jamel Nones was stopped at this point and the treated Great Saphenous Vein was observed with ultrasound over a period of 3-5 minutes observing for spasm. Utilized volume 8 mL, and cessation of proximal flow was established. The AASV was then treated in the same manner, repeating the steps above with 7 mL Varithena administered. At this point, previously marked dilated rope varicosities were treated using image guidance and direct puncture until all varices had been successfully treated or when a maximum volume of 15 mL of Varithena had been reached. During administration of Jamel Nones the patient was asked to dorsiflex the ankle to limit flow of foam into perforating veins. Once appropriate spasm had been confirmed in the treated veins, the vascular catheter was removed from the leg and light pressure was applied over the puncture site for hemostasis.      The common femoral and deep superficial veins were then evaluated for flow and compressibility prior to dressing placement. The lower extremity was kept elevated at 45 degrees above the horizontal and cording material was applied over the saphenous segments and tributaries to allow for eccentric compression over the target vessel(s) including the targeted saphenous vein(s). A multilayer dressing was applied using 4\" short stretch bandaging, as an under layer, understocking as an over layer, and thigh high 20-30 mmHg compression elastic support hose were placed on the patient. The leg was lowered only after compression had been applied and the patient was immediately ambulatory. The patient ambulated 10 minutes under supervision and was without apparent concerns at time of release. Post care instructions include walking 5 minutes of every waking hour, wearing compression 24 hours per day, taking off only to shower and then reapplying for two weeks.  The patient was instructed to take an anti-inflammatory medicine as needed and to follow up for venous duplex scan of the Great Saphenous Vein, the treated superficial varicosities, the adjacent deep veins, and any additional treatment as needed              Catrachita Birmingham MD [address] [facility], Interventional Cardiology, and Peripheral Vascular [address] [facility]   (O): [phone]  (F): [phone]

## 2022-03-01 NOTE — PATIENT INSTRUCTIONS
Post procedure instructions   1. Do no remove the bandages and compression stockings for 48 hours. Do not take a shower or bath for 48 hours. 2. After 48 hours you may remove the bandages but continue to wear the compression stockings. 3. For the first 7 days do no participate in any strenuous exercise. 4. Walk a minimum of 10 minutes. Avoid sitting for long stretches of time, such as long car rides or plane rides. 5. You will be scheduled for a follow up ultrasound 7-10 days after your procedure and follow up in office in 4-6 weeks post procedure. If you have any questions regarding the procedure itself or medications, please call [phone] and ask to speak with a [place].

## 2022-03-08 ENCOUNTER — HOSPITAL ENCOUNTER (OUTPATIENT)
Dept: VASCULAR LAB | Age: 77
Discharge: HOME | End: 2022-03-08
Payer: MEDICARE

## 2022-03-08 DIAGNOSIS — R60.0 LOCALIZED EDEMA: ICD-10-CM

## 2022-03-08 PROCEDURE — 93971 EXTREMITY STUDY: CPT

## 2022-03-29 ENCOUNTER — OFFICE VISIT (OUTPATIENT)
Dept: CARDIOLOGY CLINIC | Age: 77
End: 2022-03-29
Payer: MEDICARE

## 2022-03-29 VITALS
HEIGHT: 66 IN | BODY MASS INDEX: 28.77 KG/M2 | SYSTOLIC BLOOD PRESSURE: 128 MMHG | DIASTOLIC BLOOD PRESSURE: 86 MMHG | WEIGHT: 179 LBS | OXYGEN SATURATION: 98 % | HEART RATE: 68 BPM

## 2022-03-29 DIAGNOSIS — R60.0 LOCALIZED EDEMA: Primary | ICD-10-CM

## 2022-03-29 DIAGNOSIS — I87.2 VENOUS INSUFFICIENCY: ICD-10-CM

## 2022-03-29 DIAGNOSIS — I83.893 VARICOSE VEINS OF BILATERAL LOWER EXTREMITIES WITH OTHER COMPLICATIONS: ICD-10-CM

## 2022-03-29 PROCEDURE — 4040F PNEUMOC VAC/ADMIN/RCVD: CPT | Performed by: INTERNAL MEDICINE

## 2022-03-29 PROCEDURE — 93000 ELECTROCARDIOGRAM COMPLETE: CPT | Performed by: INTERNAL MEDICINE

## 2022-03-29 PROCEDURE — G8484 FLU IMMUNIZE NO ADMIN: HCPCS | Performed by: INTERNAL MEDICINE

## 2022-03-29 PROCEDURE — 1123F ACP DISCUSS/DSCN MKR DOCD: CPT | Performed by: INTERNAL MEDICINE

## 2022-03-29 PROCEDURE — 1036F TOBACCO NON-USER: CPT | Performed by: INTERNAL MEDICINE

## 2022-03-29 PROCEDURE — 99214 OFFICE O/P EST MOD 30 MIN: CPT | Performed by: INTERNAL MEDICINE

## 2022-03-29 PROCEDURE — G8417 CALC BMI ABV UP PARAM F/U: HCPCS | Performed by: INTERNAL MEDICINE

## 2022-03-29 PROCEDURE — G8427 DOCREV CUR MEDS BY ELIG CLIN: HCPCS | Performed by: INTERNAL MEDICINE

## 2022-03-29 PROCEDURE — 3017F COLORECTAL CA SCREEN DOC REV: CPT | Performed by: INTERNAL MEDICINE

## 2022-03-29 NOTE — PATIENT INSTRUCTIONS
Patient Education        Varicose Veins: Care Instructions  Your Care Instructions  Varicose veins are twisted, enlarged veins near the surface of the skin. Theydevelop most often in the legs and ankles. Some people may be more likely than others to get varicose veins because of aging or hormone changes or because a parent has them. Being overweight or pregnant can make varicose veins worse. Jobs that require standing for longperiods of time also can make them worse. Follow-up care is a key part of your treatment and safety. Be sure to make and go to all appointments, and call your doctor if you are having problems. It's also a good idea to know your test results and keep alist of the medicines you take. How can you care for yourself at home?  Wear compression stockings during the day to help relieve symptoms. They improve blood flow and are the main treatment for varicose veins. Talk to your doctor about which ones to get and where to get them.  Prop up your legs at or above the level of your heart when possible. This helps keep the blood from pooling in your lower legs and improves blood flow to the rest of your body.  Avoid sitting and standing for long periods. This puts added stress on your veins.  Get regular exercise, and control your weight. Walk, bicycle, or swim to improve blood flow in your legs.  If you bump your leg so hard that you know it is likely to bruise, prop up your leg and put ice or a cold pack on the area for 10 to 20 minutes at a time. Try to do this every 1 to 2 hours for the next 3 days (when you are awake) or until the swelling goes down. Put a thin cloth between the ice and your skin.  If you cut or scratch the skin over a vein, it may bleed a lot. Prop up your leg and apply firm pressure with a clean bandage over the site of the bleeding. Continue to apply pressure for a full 15 minutes. Do not check sooner to see if the bleeding has stopped.  If the bleeding has not stopped after 15 minutes, apply pressure again for another 15 minutes. You can repeat this up to 3 times for a total of 45 minutes. If you have a blood clot in a varicose vein, you may have tenderness and swelling over the vein. The vein may feel firm. Be sure to call your doctor right away if you have these symptoms. If your doctor has told you how to carefor the clot, follow his or her instructions. Care may include the following:   Prop up your leg and apply heat with a warm, damp cloth or a heating pad set on low (put a towel or cloth between your leg and the heating pad to prevent burns).  Ask your doctor if you can take an over-the-counter pain medicine, such as acetaminophen (Tylenol), ibuprofen (Advil, Motrin), or naproxen (Aleve). Be safe with medicines. Read and follow all instructions on the label. When should you call for help? Call 911 anytime you think you may need emergency care. For example, call if:     You have sudden chest pain and shortness of breath, or you cough up blood. Call your doctor now or seek immediate medical care if:     You have signs of a blood clot, such as:  ? Pain in your calf, back of the knee, thigh, or groin. ? Redness and swelling in your leg or groin.      A varicose vein begins to bleed and you cannot stop it.      You have a tender lump in your leg.      You get an open sore. Watch closely for changes in your health, and be sure to contact your doctor if:     Your varicose vein symptoms do not improve with home treatment. Where can you learn more? Go to [email or web address] [email or web address] [email or web address] org and sign in to your [employer] account. Enter T102 in the [facility] box to learn more about \"Varicose Veins: Care Instructions. \"     If you do not have an account, please click on the \"Sign Up Now\" link. Current as of: July 6, 2021               Content Version: 13.2  © 6607-8188 Healthwise, [employer].    Care instructions adapted under license by [facility] ([facility]). If you have questions about a medical condition or this instruction, always ask your healthcare professional. [place] [address] any warranty or liability for your use of this information.

## 2022-03-29 NOTE — PROGRESS NOTES
Cardiology Consultation     Larry Sanches  1945    PCP: Samir Jamison MD  Referring Physician: Kraig Broderick MD  Reason for Referral: Venous insufficiency     Chief Complaint:   Chief Complaint   Patient presents with    Follow-up     s/p varithena     Subjective:   History of Present Illness: The patient is 78 y.o. male with a past medical history significant for atrial fibrillation, hyperlipidemia and hypothyroidism. He typically follows with Dr. Chantal Chawla and was referred for further evaluation of varicose veins. He stated that he notices it more in his left leg more that his right. He says that he has noticed this over the last 5-6 years. He underwent left GSV Varithena treatment 3/1/22 and presents today for a follow up. Today, he states overall he is doing well. He denies any new cardiac complaints. He notes great improvement in the LE edema but continues to have small varicose veins at the ankle causing some mild pain and heaviness. He reports compliance with his medications and tolerating. He denies any abnormal bleeding or bruising. Patient denies exertional chest pain/pressure, dyspnea at rest, worsening DE SOUZA, PND, orthopnea, palpitations, lightheadedness, weight changes, changes in LE edema, and syncope.     Past Medical History:   Diagnosis Date    Atrial fibrillation ([address]     new onset    Kidney stone     Mixed hyperlipidemia     managed by PCP    Near syncope     Palpitations     Prostate enlargement     Syncope and collapse      Past Surgical History:   Procedure Laterality Date    COLONOSCOPY  9/26/2001    negative, deak, also neg sigmoid 2007    COLONOSCOPY  2/15/2011    negative dr Alpesh Merchant   [facility] COLONOSCOPY  08/23/2021    dr Clair Finch, polyps, repeat in 5 years    KIDNEY STONE SURGERY       Family History   Problem Relation Age of Onset    Rheum Arthritis Brother     Heart Disease Brother     Heart Disease Mother         older age   Blase Liming Dementia Father      Social History     Tobacco Use    Smoking status: Never Smoker    Smokeless tobacco: Never Used   Vaping Use    Vaping Use: Never used   Substance Use Topics    Alcohol use: Not Currently     Alcohol/week: 0.0 standard drinks     Comment: minimal caffeine occasional wine    Drug use: No       No Known Allergies  Current Outpatient Medications   Medication Sig Dispense Refill    metoprolol succinate (TOPROL XL) 50 MG extended release tablet Take 1 tablet by mouth daily 90 tablet 3    mometasone (ELOCON) 0.1 % cream Apply topically daily. 60 g 0    clotrimazole (LOTRIMIN) 1 % cream Apply topically 2 times daily Apply topically 2 times daily. For 2 week 45 g 0    pravastatin (PRAVACHOL) 40 MG tablet TAKE 1 TABLET BY MOUTH EVERY EVENING 90 tablet 1    aspirin 325 MG EC tablet Take 1 tablet by mouth daily 30 tablet 3    Acetaminophen 650 MG TABS Take by mouth 2 times daily       Glucosamine-Chondroitin-MSM (TRIPLE FLEX PO) Take by mouth 3 times daily       Terbinafine HCl (LAMISIL EX) Apply  topically.  Multiple Vitamins-Minerals (MADHU MULTIVITAMIN FOR MEN PO) Take  by mouth. For memory takes 1 tablet      SAW PALMETTO Take by mouth 2 times daily        No current facility-administered medications for this visit. Review of Systems:  · Constitutional: No unanticipated weight loss. There's been no change in energy level, sleep pattern, or activity level. No fevers, chills. · Eyes: No visual changes or diplopia. No scleral icterus. · ENT: No Headaches, hearing loss or vertigo. No mouth sores or sore throat. · Cardiovascular: as reviewed in HPI  · Respiratory: No cough or wheezing, no sputum production. No hemoptysis. · Gastrointestinal: No abdominal pain, appetite loss, blood in stools. No change in bowel or bladder habits. · Genitourinary: No dysuria, trouble voiding, or hematuria. · Musculoskeletal:  No gait disturbance, no joint complaints.   · Integumentary: No rash or pruritis. · Neurological: No headache, diplopia, change in muscle strength, numbness or tingling. · Psychiatric: No anxiety or depression. · Endocrine: No temperature intolerance. No excessive thirst, fluid intake, or urination. No tremor. · Hematologic/Lymphatic: No abnormal bruising or bleeding, blood clots or swollen lymph nodes. · Allergic/Immunologic: No nasal congestion or hives. Physical Exam:   /86   Pulse 68   Ht 5' 5.5\" (1.664 m)   Wt 179 lb (81.2 kg)   SpO2 98%   BMI 29.33 kg/m²   Wt Readings from Last 3 Encounters:   03/29/22 179 lb (81.2 kg)   01/31/22 179 lb (81.2 kg)   01/20/22 179 lb (81.2 kg)     Constitutional: He is oriented to person, place, and time. He appears well-developed and well-nourished. In no acute distress. Head: Normocephalic and atraumatic. Pupils equal and round. Neck: Neck supple. No JVP or carotid bruit appreciated. No mass and no thyromegaly present. No lymphadenopathy present. Cardiovascular: Normal rate. Normal heart sounds. Exam reveals no gallop and no friction rub. No murmur heard. Pulmonary/Chest: Effort normal and breath sounds normal. No respiratory distress. He has no wheezes, rhonchi or rales. Abdominal: Soft, non-tender. Bowel sounds are normal. He exhibits no organomegaly, mass or bruit. Extremities: Trace Left ankle edema. Left ankle varicosity, mid left calf. No cyanosis or clubbing. Pulses are 2+ radial/carotid bilaterally. Neurological: No gross cranial nerve deficit. Coordination normal.   Skin: Skin is warm and dry. There is no rash or diaphoresis. Psychiatric: He has a normal mood and affect.  His speech is normal and behavior is normal.     Lab Review:   FLP:    Lab Results   Component Value Date    TRIG 82 01/20/2022    HDL 52 01/20/2022    HDL 57 01/11/2012    LDLCALC 90 01/20/2022    LABVLDL 16 01/20/2022     BUN/Creatinine:    Lab Results   Component Value Date    BUN 18 01/20/2022    CREATININE 1.0 01/20/2022     PT/INR, TNI, HGB A1C:   Lab Results   Component Value Date/Time    TROPONINI <0.01 11/29/2020 10:40 AM      No results found for: CBCAUTODIF    Doppler:   Venous reflux study 12/9/2021  Right Impression   Duplex sonography of the right lower extremity venous system shows no evidence   of deep venous thrombosis. Examination was obtained of the common femoral,   deep femoral, femoral, popliteal, peroneal and posterior tibial veins. There is no evidence of clinically significant deep or superficial venous   reflux in the right lower extremity. No incompetent  veins noted in the right calf. Dual right femoral vein system noted, this is a normal variant. The right SFJ measures 7.4 mm. Left Impression   Duplex sonography of the left lower extremity venous system shows no evidence   of deep venous thrombosis. Examination was obtained of the common femoral,   deep femoral, femoral, popliteal, peroneal and posterior tibial veins. There is no evidence of clinically significant deep venous reflux in the left   lower extremity. Clinically significant venous reflux (greater than 0.5 seconds duration) is   noted at the left great saphenous vein (GSV) from proximal thigh to proximal   calf and the small saphenous vein at the ankle. There is a dilated  vein at the mid calf. The left SFJ measures 11.8 mm. All above diagnostic testing and laboratory data was independently visualized and reviewed by me (not simply review of report)       Assessment and Plan   1) Venous insufficiency/varicose veins   Chronic LE edema L <R   CEAP class 4  S/p Left Varithena 3/1/22  Notes overall improvement but continues to report ankle pain and heaviness   Encouraged compression stockings and elevation  Will pursue left leg retreat with continued symptoms             Follow up post procedure     Thank you very much for allowing me to participate in the care of your patient.  Please do not hesitate to contact me if you

## 2022-04-01 ENCOUNTER — TELEPHONE (OUTPATIENT)
Dept: CARDIOLOGY CLINIC | Age: 77
End: 2022-04-01

## 2022-04-01 NOTE — TELEPHONE ENCOUNTER
Called and spoke with César Pacheco, he states he is unavailable from 11-1. Procedure rescheduled for 5/10/22 at 2pm. He v/u and instructed to return call to the office.

## 2022-04-01 NOTE — TELEPHONE ENCOUNTER
Pt called in to reschedule procedure. Estefani Pineda will call him back.       Estelita Ding # [phone]

## 2022-04-04 RX ORDER — PRAVASTATIN SODIUM 40 MG
TABLET ORAL
Qty: 90 TABLET | Refills: 1 | Status: SHIPPED | OUTPATIENT
Start: 2022-04-04

## 2022-05-10 ENCOUNTER — PROCEDURE VISIT (OUTPATIENT)
Dept: CARDIOLOGY CLINIC | Age: 77
End: 2022-05-10
Payer: MEDICARE

## 2022-05-10 DIAGNOSIS — I87.2 VENOUS INSUFFICIENCY: Primary | ICD-10-CM

## 2022-05-10 PROCEDURE — 36465 NJX NONCMPND SCLRSNT 1 VEIN: CPT | Performed by: INTERNAL MEDICINE

## 2022-05-10 NOTE — PROGRESS NOTES
[address]   Venous Ablation Procedure Note      Patient Name: Paco Adamson  MRN: [id number]  Date of procedure: May 10, 2022  Dictating Physician: Swapnil Michele MD, [facility] [place]   Pre-Operative Diagnosis: CEAP class 3 venous insufficiency with pain, tenderness, and edema and incompetent left great saphenous vein (GSV), chronic venous insufficiency   Procedure Performed:Ultrasound guided microfoam chemical ablation with varithena of the left GSV from below the saphenofemoral junction to the mid calf access site   Anesthesia: local 1% lidocaine cutaneous anesthetic   CPT codes:   72974  Indications for Procedure: This is a 78 y.o.male with severe symptomatic venous reflux disease despite 3 months of conservative medical therapy. Procedure: The patient walked to the procedure room. All applicable staff donned appropriate apparel. A procedure time out was performed to confirm correct patient, correct extremity, correct procedure and correct room set including presence of all applicable supplies, devices and drugs. Monitoring devices were appropriately positioned. A duplex ultrasound scan confirmed the location and incompetence of the Great Saphenous Vein and its course marked on the skin together with the dilated tributaries. The extent of treatment of the Great Saphenous Vein and the associated varicosities was determined through Ultrasound mapping. The limb was prepped and sterile drapes were applied. A skin welt was made to facilitate access using 1.0% lidocaine at the chosen puncture site in the lower thigh overlying the Great Saphenous Vein. The skin was punctured through the anaesthetized skin with the venous access needle and advanced under ultrasound guidance and the vein(s) to be treated was punctured with a micro-puncture needle followed by passage of a 0.018 guide wire through the punctured needle and a 4 Lao dilator sheath.  Catheter access was established to the target vessel(s) and venous access confirmed by aspiration of dark low pressure blood. Upon gaining access at the ankle. The Varithena canister has been obtained through the office for the patient. The Varithena canister was activated and the canister was primed and purged as required in the instructions for use. A 5 mL aliquot of Varithena was drawn into a sterile syringe which was then attached to a saline filled extension tube for administration of Varithena through the extension tube into the vascular catheter. [place] was slowly administered at 0.5-1.0 cc/second with close observation by ultrasound of its course in the Baumann Moreno Saphenous Vein. When Areli Clos arrived to approximately 3-5 cm from the Saphenofemoral Junction, the Great Saphenous Vein was then compressed to prevent flow into the common femoral vein. Further administration of Areli Clos was stopped at this point and the treated Great Saphenous Vein was observed with ultrasound over a period of 3-5 minutes observing for spasm. Utilized volume 3 mL, and cessation of proximal flow was established. During administration of [place] the patient was asked to dorsiflex the ankle to limit flow of foam into perforating veins. Once appropriate spasm had been confirmed in the treated veins, the vascular catheter was removed from the leg and light pressure was applied over the puncture site for hemostasis.            Toney Briones MD [id number], Interventional Cardiology, and Peripheral Vascular Disease   [facility] [place] [facility]   (O): [phone]  (F): [phone]

## 2022-05-10 NOTE — PATIENT INSTRUCTIONS
Patient Education        Learning About Venous Insufficiency  What is it? Venous insufficiency is a problem with the flow of blood from the veins of the legs back to the heart. It's also called chronic venous insufficiency orchronic venous stasis. Your veins bring blood back to the heart after it flows through your body. Veins have valves that keep the blood moving in one directiontoward the heart. But with venous insufficiency, the veins of the legs might not work as they should. This can allow blood to leak backward. Fluid can pool in the legs. Thiscan lead to problems that include varicose veins. What causes it? Venous insufficiency is sometimes caused by deep vein thrombosis and high bloodpressure inside leg veins. You are more likely to have venous insufficiency if you:   Are older.  Are female.  Are overweight.  Don't get enough physical activity.  Smoke.  Have a family history of varicose veins. What are the symptoms? Symptoms of venous insufficiency affect the legs. They may include:   Swelling, often in the ankles.  A rash.  Varicose veins.  Itching.  Cramping.  Skin sores (ulcers).  Aching or a feeling of heaviness.  Changes in skin color. How is it diagnosed? Your doctor can diagnose venous insufficiency by examining your legs and by using a type of ultrasound test (duplex Doppler) to find out how well blood isflowing in your legs. How is it treated? To reduce swelling and relieve pain caused by venous insufficiency, you can wear compression stockings. They are tighter at the ankles than at the top of the legs. They also can help venous skin ulcers heal. But there are different types of stockings, and they need to fit right. So your doctor will recommendwhat you need. You also can try to:   Get more exercise, especially walking. It can increase blood flow.  Avoid standing still or sitting for a long time, which can make the fluid pool in your legs.    Try not to sit with your legs crossed at the knee.  Keep your legs raised above your heart when you're lying down. This reduces swelling. If these treatments don't work, you may need medicine or a procedure to help relieve symptoms. Procedures might be done to close the vein, to remove thevein, or to improve blood flow. Follow-up care is a key part of your treatment and safety. Be sure to make and go to all appointments, and call your doctor if you are having problems. It's also a good idea to know your test results and keep alist of the medicines you take. Current as of: July 6, 2021               Content Version: 13.2  © 2006-2022 [employer]. Care instructions adapted under license by [facility] ([facility]). If you have questions about a medical condition or this instruction, always ask your healthcare professional. [address] any warranty or liability for your use of this information. Post procedure instructions   1. Do no remove the bandages and compression stockings for 48 hours. Do not take a shower or bath for 48 hours. 2. After 48 hours you may remove the bandages but continue to wear the compression stockings. 3. For the first 7 days do no participate in any strenuous exercise. 4. Walk a minimum of 10 minutes. Avoid sitting for long stretches of time, such as long car rides or plane rides. 5. You will be scheduled for a follow up ultrasound 7-10 days after your procedure and follow up in office in 4-6 weeks post procedure. If you have any questions regarding the procedure itself or medications, please call [phone] and ask to speak with a nurse.

## 2022-05-17 LAB — PROSTATE SPECIFIC ANTIGEN: 3.98 NG/ML (ref 0–4)

## 2022-08-17 ENCOUNTER — OFFICE VISIT (OUTPATIENT)
Dept: CARDIOLOGY CLINIC | Age: 77
End: 2022-08-17
Payer: MEDICARE

## 2022-08-17 VITALS
HEIGHT: 66 IN | OXYGEN SATURATION: 96 % | BODY MASS INDEX: 27.74 KG/M2 | DIASTOLIC BLOOD PRESSURE: 74 MMHG | SYSTOLIC BLOOD PRESSURE: 130 MMHG | WEIGHT: 172.6 LBS | HEART RATE: 95 BPM

## 2022-08-17 DIAGNOSIS — I87.2 VENOUS INSUFFICIENCY: ICD-10-CM

## 2022-08-17 DIAGNOSIS — I83.893 VARICOSE VEINS OF BILATERAL LOWER EXTREMITIES WITH OTHER COMPLICATIONS: ICD-10-CM

## 2022-08-17 DIAGNOSIS — I48.19 PERSISTENT ATRIAL FIBRILLATION (HCC): Primary | ICD-10-CM

## 2022-08-17 DIAGNOSIS — R60.0 LOCALIZED EDEMA: ICD-10-CM

## 2022-08-17 PROCEDURE — 93000 ELECTROCARDIOGRAM COMPLETE: CPT | Performed by: INTERNAL MEDICINE

## 2022-08-17 PROCEDURE — 3017F COLORECTAL CA SCREEN DOC REV: CPT | Performed by: INTERNAL MEDICINE

## 2022-08-17 PROCEDURE — G8427 DOCREV CUR MEDS BY ELIG CLIN: HCPCS | Performed by: INTERNAL MEDICINE

## 2022-08-17 PROCEDURE — 99214 OFFICE O/P EST MOD 30 MIN: CPT | Performed by: INTERNAL MEDICINE

## 2022-08-17 PROCEDURE — G8417 CALC BMI ABV UP PARAM F/U: HCPCS | Performed by: INTERNAL MEDICINE

## 2022-08-17 PROCEDURE — 1123F ACP DISCUSS/DSCN MKR DOCD: CPT | Performed by: INTERNAL MEDICINE

## 2022-08-17 PROCEDURE — 1036F TOBACCO NON-USER: CPT | Performed by: INTERNAL MEDICINE

## 2022-08-17 NOTE — PROGRESS NOTES
Cardiology  Progress     Brianna Morley  1945    PCP: Gilma Blue MD  Referring Physician: Sterling Hinkle MD  Reason for Referral: Venous insufficiency     Chief Complaint:   Chief Complaint   Patient presents with    Follow-up     No cc     Subjective:   History of Present Illness: The patient is 78 y.o. male with a past medical history significant for atrial fibrillation, hyperlipidemia and hypothyroidism. He typically follows with Dr. Jonathan Browning and was referred for further evaluation of varicose veins. He stated that he notices it more in his left leg more that his right. He says that he has noticed this over the last 5-6 years. He underwent left GSV Varithena treatment 3/1/22 and presents today for a follow up. He underwent GSV Varithena ankle treatment 5/2022. Today, he states overall he is doing well. He denies any new cardiac complaints. He notes great improvement in the LE edema and the small varicose veins at the ankles have improved. He reports compliance with his medications and tolerating. He denies any abnormal bleeding or bruising. Patient denies exertional chest pain/pressure, dyspnea at rest, worsening DE SOUZA, PND, orthopnea, palpitations, lightheadedness, weight changes, changes in LE edema, and syncope.     Past Medical History:   Diagnosis Date    Atrial fibrillation [facility])     new onset    Kidney stone     Mixed hyperlipidemia     managed by PCP    Near syncope     Palpitations     Prostate enlargement     Syncope and collapse      Past Surgical History:   Procedure Laterality Date    COLONOSCOPY  9/26/2001    negative, deak, also neg sigmoid 2007    COLONOSCOPY  2/15/2011    negative dr Amado Mireles    COLONOSCOPY  08/23/2021    dr Tiana Ko, polyps, repeat in 5 years    KIDNEY STONE SURGERY       Family History   Problem Relation Age of Onset    Rheum Arthritis Brother     Heart Disease Brother     Heart Disease Mother         older age    Dementia Father      Social History Tobacco Use    Smoking status: Never    Smokeless tobacco: Never   Vaping Use    Vaping Use: Never used   Substance Use Topics    Alcohol use: Not Currently     Alcohol/week: 0.0 standard drinks     Comment: minimal caffeine occasional wine    Drug use: No       No Known Allergies  Current Outpatient Medications   Medication Sig Dispense Refill    pravastatin (PRAVACHOL) 40 MG tablet TAKE 1 TABLET BY MOUTH EVERY EVENING 90 tablet 1    metoprolol succinate (TOPROL XL) 50 MG extended release tablet Take 1 tablet by mouth daily 90 tablet 3    mometasone (ELOCON) 0.1 % cream Apply topically daily. 60 g 0    clotrimazole (LOTRIMIN) 1 % cream Apply topically 2 times daily Apply topically 2 times daily. For 2 week 45 g 0    aspirin 325 MG EC tablet Take 1 tablet by mouth daily 30 tablet 3    Acetaminophen 650 MG TABS Take by mouth 2 times daily       Glucosamine-Chondroitin-MSM (TRIPLE FLEX PO) Take by mouth 3 times daily       Terbinafine HCl (LAMISIL EX) Apply  topically. Multiple Vitamins-Minerals (MADHU MULTIVITAMIN FOR MEN PO) Take  by mouth. For memory takes 1 tablet      SAW PALMETTO Take by mouth 2 times daily        No current facility-administered medications for this visit. Review of Systems:  Constitutional: No unanticipated weight loss. There's been no change in energy level, sleep pattern, or activity level. No fevers, chills. Eyes: No visual changes or diplopia. No scleral icterus. ENT: No Headaches, hearing loss or vertigo. No mouth sores or sore throat. Cardiovascular: as reviewed in HPI  Respiratory: No cough or wheezing, no sputum production. No hemoptysis. Gastrointestinal: No abdominal pain, appetite loss, blood in stools. No change in bowel or bladder habits. Genitourinary: No dysuria, trouble voiding, or hematuria. Musculoskeletal:  No gait disturbance, no joint complaints. Integumentary: No rash or pruritis.   Neurological: No headache, diplopia, change in muscle strength, numbness or tingling. Psychiatric: No anxiety or depression. Endocrine: No temperature intolerance. No excessive thirst, fluid intake, or urination. No tremor. Hematologic/Lymphatic: No abnormal bruising or bleeding, blood clots or swollen lymph nodes. Allergic/Immunologic: No nasal congestion or hives. Physical Exam:   /74   Pulse 95   Ht 5' 5.5\" (1.664 m)   Wt 172 lb 9.6 oz (78.3 kg)   SpO2 96%   BMI 28.29 kg/m²   Wt Readings from Last 3 Encounters:   08/17/22 172 lb 9.6 oz (78.3 kg)   03/29/22 179 lb (81.2 kg)   01/31/22 179 lb (81.2 kg)     Physical Exam:   Constitutional: The patient is oriented to person, place, and time. Appears well-developed and well-nourished. In no acute distress. Head: Normocephalic and atraumatic. Pupils equal and round. Neck: Neck supple. No JVP or carotid bruit appreciated. No mass and no thyromegaly present. No lymphadenopathy present. Cardiovascular: Normal rate. Normal heart sounds. Exam reveals no gallop and no friction rub. No murmur heard. Pulmonary/Chest: Effort normal and breath sounds normal. No respiratory distress. No wheezes, rhonchi or rales. Abdominal: Soft, non-tender. Bowel sounds are normal. Exhibits no organomegaly, mass or bruit. Extremities: No edema. No cyanosis or clubbing. Pulses are 2+ radial and carotid bilaterally. Neurological: No gross cranial nerve deficit. Coordination normal.   Skin: Skin is warm and dry. There is no rash or diaphoresis. Psychiatric: Patient has a normal mood and affect.  Speech is normal and behavior is normal.       Lab Review:   FLP:    Lab Results   Component Value Date/Time    TRIG 82 01/20/2022 09:15 AM    HDL 52 01/20/2022 09:15 AM    HDL 57 01/11/2012 08:42 AM    LDLCALC 90 01/20/2022 09:15 AM    LABVLDL 16 01/20/2022 09:15 AM     BUN/Creatinine:    Lab Results   Component Value Date/Time    BUN 18 01/20/2022 09:15 AM    CREATININE 1.0 01/20/2022 09:15 AM     PT/INR, TNI, HGB A1C:   Lab Results Component Value Date/Time    TROPONINI <0.01 11/29/2020 10:40 AM      No results found for: CBCAUTODIF    Doppler:   Venous reflux study 12/9/2021  Right Impression   Duplex sonography of the right lower extremity venous system shows no evidence   of deep venous thrombosis. Examination was obtained of the common femoral,   deep femoral, femoral, popliteal, peroneal and posterior tibial veins. There is no evidence of clinically significant deep or superficial venous   reflux in the right lower extremity. No incompetent  veins noted in the right calf. Dual right femoral vein system noted, this is a normal variant. The right SFJ measures 7.4 mm. Left Impression   Duplex sonography of the left lower extremity venous system shows no evidence   of deep venous thrombosis. Examination was obtained of the common femoral,   deep femoral, femoral, popliteal, peroneal and posterior tibial veins. There is no evidence of clinically significant deep venous reflux in the left   lower extremity. Clinically significant venous reflux (greater than 0.5 seconds duration) is   noted at the left great saphenous vein (GSV) from proximal thigh to proximal   calf and the small saphenous vein at the ankle. There is a dilated  vein at the mid calf. The left SFJ measures 11.8 mm. All above diagnostic testing and laboratory data was independently visualized and reviewed by me (not simply review of report)       Assessment and Plan   1) Venous insufficiency/varicose veins   Chronic LE edema L <R   CEAP class 4  S/p Left Varithena 3/1/22  S/p left ankle treatment 5/10/22   Great improvement s/p Varithena treatments   Encouraged compression stockings and elevation    PRE:       POST:       Follow up as needed   Continue follow up with Dr. Pawel Rice     Thank you very much for allowing me to participate in the care of your patient. Please do not hesitate to contact me if you have any questions.       Melissa De Luna

## 2022-08-22 ENCOUNTER — OFFICE VISIT (OUTPATIENT)
Dept: FAMILY MEDICINE CLINIC | Age: 77
End: 2022-08-22
Payer: MEDICARE

## 2022-08-22 VITALS
WEIGHT: 172 LBS | DIASTOLIC BLOOD PRESSURE: 80 MMHG | BODY MASS INDEX: 27.64 KG/M2 | SYSTOLIC BLOOD PRESSURE: 122 MMHG | HEIGHT: 66 IN | HEART RATE: 72 BPM | TEMPERATURE: 97.2 F

## 2022-08-22 VITALS
HEIGHT: 66 IN | DIASTOLIC BLOOD PRESSURE: 80 MMHG | WEIGHT: 172 LBS | BODY MASS INDEX: 27.64 KG/M2 | SYSTOLIC BLOOD PRESSURE: 122 MMHG | TEMPERATURE: 97.2 F

## 2022-08-22 DIAGNOSIS — E78.2 MIXED HYPERLIPIDEMIA: Primary | ICD-10-CM

## 2022-08-22 DIAGNOSIS — R10.9 ABDOMINAL DISTRESS: ICD-10-CM

## 2022-08-22 DIAGNOSIS — Z00.00 MEDICARE ANNUAL WELLNESS VISIT, SUBSEQUENT: Primary | ICD-10-CM

## 2022-08-22 DIAGNOSIS — Z23 NEED FOR TDAP VACCINATION: ICD-10-CM

## 2022-08-22 DIAGNOSIS — N52.9 ERECTILE DYSFUNCTION, UNSPECIFIED ERECTILE DYSFUNCTION TYPE: ICD-10-CM

## 2022-08-22 PROCEDURE — G8417 CALC BMI ABV UP PARAM F/U: HCPCS | Performed by: FAMILY MEDICINE

## 2022-08-22 PROCEDURE — 3017F COLORECTAL CA SCREEN DOC REV: CPT | Performed by: FAMILY MEDICINE

## 2022-08-22 PROCEDURE — 1036F TOBACCO NON-USER: CPT | Performed by: FAMILY MEDICINE

## 2022-08-22 PROCEDURE — 90715 TDAP VACCINE 7 YRS/> IM: CPT | Performed by: FAMILY MEDICINE

## 2022-08-22 PROCEDURE — 99214 OFFICE O/P EST MOD 30 MIN: CPT | Performed by: FAMILY MEDICINE

## 2022-08-22 PROCEDURE — 3017F COLORECTAL CA SCREEN DOC REV: CPT | Performed by: NURSE PRACTITIONER

## 2022-08-22 PROCEDURE — G0439 PPPS, SUBSEQ VISIT: HCPCS | Performed by: NURSE PRACTITIONER

## 2022-08-22 PROCEDURE — G8427 DOCREV CUR MEDS BY ELIG CLIN: HCPCS | Performed by: FAMILY MEDICINE

## 2022-08-22 PROCEDURE — 90471 IMMUNIZATION ADMIN: CPT | Performed by: FAMILY MEDICINE

## 2022-08-22 PROCEDURE — 1123F ACP DISCUSS/DSCN MKR DOCD: CPT | Performed by: FAMILY MEDICINE

## 2022-08-22 PROCEDURE — 1123F ACP DISCUSS/DSCN MKR DOCD: CPT | Performed by: NURSE PRACTITIONER

## 2022-08-22 ASSESSMENT — ENCOUNTER SYMPTOMS
BLOOD IN STOOL: 0
DIARRHEA: 0
SHORTNESS OF BREATH: 0
ABDOMINAL PAIN: 0
CONSTIPATION: 0

## 2022-08-22 ASSESSMENT — PATIENT HEALTH QUESTIONNAIRE - PHQ9
2. FEELING DOWN, DEPRESSED OR HOPELESS: 0
SUM OF ALL RESPONSES TO PHQ QUESTIONS 1-9: 0
1. LITTLE INTEREST OR PLEASURE IN DOING THINGS: 0
SUM OF ALL RESPONSES TO PHQ QUESTIONS 1-9: 0
SUM OF ALL RESPONSES TO PHQ QUESTIONS 1-9: 0
SUM OF ALL RESPONSES TO PHQ9 QUESTIONS 1 & 2: 0
SUM OF ALL RESPONSES TO PHQ QUESTIONS 1-9: 0

## 2022-08-22 ASSESSMENT — LIFESTYLE VARIABLES: HOW OFTEN DO YOU HAVE A DRINK CONTAINING ALCOHOL: NEVER

## 2022-08-22 NOTE — PROGRESS NOTES
Subjective:      Patient ID: Bianca Hollis is a 78 y.o. male. Chief Complaint   Patient presents with    Follow-up     Lipids - pt is fasting        Patient presents with: Follow-up: Lipids - pt is fasting    Here for the above  Really quite well  Meds the same  Had some gi upset a week ago. Resolved and he is well now. Staying active outdoors and does exercise    YOB: 1945    Date of Visit:  8/22/2022    Not on File    Current Outpatient Medications:  pravastatin (PRAVACHOL) 40 MG tablet, TAKE 1 TABLET BY MOUTH EVERY EVENING, Disp: 90 tablet, Rfl: 1  metoprolol succinate (TOPROL XL) 50 MG extended release tablet, Take 1 tablet by mouth daily, Disp: 90 tablet, Rfl: 3  mometasone (ELOCON) 0.1 % cream, Apply topically daily. , Disp: 60 g, Rfl: 0  clotrimazole (LOTRIMIN) 1 % cream, Apply topically 2 times daily Apply topically 2 times daily. For 2 week, Disp: 45 g, Rfl: 0  aspirin 325 MG EC tablet, Take 1 tablet by mouth daily, Disp: 30 tablet, Rfl: 3  Acetaminophen 650 MG TABS, Take by mouth 2 times daily , Disp: , Rfl:   Glucosamine-Chondroitin-MSM (TRIPLE FLEX PO), Take by mouth 3 times daily , Disp: , Rfl:   Terbinafine HCl (LAMISIL EX), Apply  topically. , Disp: , Rfl:   Multiple Vitamins-Minerals (MADHU MULTIVITAMIN FOR MEN PO), Take  by mouth. For memory takes 1 tablet, Disp: , Rfl:   SAW PALMETTO, Take by mouth 2 times daily , Disp: , Rfl:     No current facility-administered medications for this visit.      ---------------------------               08/22/22                      0827         ---------------------------   BP:          122/80         Site:    Left Upper Arm     Position:     Sitting        Cuff Size:  Medium Adult      Pulse:         72           Temp:   97.2 °F (36.2 °C)   TempSrc:    Temporal        Weight:  172 lb (78 kg)     Height: 5' 5.5\" (1.664 m)  ---------------------------  Body mass index is 28.19 kg/m².      Wt Readings from Last 3 Encounters:  08/22/22 : 172 lb (78 kg)  08/17/22 : 172 lb 9.6 oz (78.3 kg)  03/29/22 : 179 lb (81.2 kg)    BP Readings from Last 3 Encounters:  08/22/22 : 122/80  08/17/22 : 130/74  03/29/22 : 128/86          Review of Systems   Constitutional:  Negative for chills and fever. Respiratory:  Negative for shortness of breath. Cardiovascular:  Negative for chest pain. Gastrointestinal:  Negative for abdominal pain, blood in stool, constipation and diarrhea. No gerd no dysphagia    Genitourinary:  Negative for difficulty urinating, dysuria and hematuria. Erection difficulties   Neurological:  Negative for headaches. Objective:   Physical Exam  Constitutional:       General: He is not in acute distress. Appearance: Normal appearance. He is well-developed. He is not ill-appearing or diaphoretic. Neck:      Thyroid: No thyroid mass or thyromegaly. Cardiovascular:      Rate and Rhythm: Normal rate and regular rhythm. Heart sounds: Normal heart sounds. No murmur heard. No friction rub. No gallop. Comments:     Pulmonary:      Effort: Pulmonary effort is normal. No tachypnea, accessory muscle usage or respiratory distress. Breath sounds: Normal breath sounds. No decreased breath sounds, wheezing, rhonchi or rales. Chest:   Breasts:     Right: No supraclavicular adenopathy. Left: No supraclavicular adenopathy. Abdominal:      General: Bowel sounds are normal. There is no distension or abdominal bruit. Palpations: Abdomen is soft. There is no hepatomegaly, splenomegaly, mass or pulsatile mass. Tenderness: There is no abdominal tenderness. There is no guarding. Musculoskeletal:      Cervical back: Neck supple. Lymphadenopathy:      Cervical: No cervical adenopathy. Upper Body:      Right upper body: No supraclavicular adenopathy. Left upper body: No supraclavicular adenopathy. Skin:     General: Skin is warm and dry.       Coloration: Skin is not pale. Nails: There is no clubbing. Neurological:      Mental Status: He is alert. Assessment:       Diagnosis Orders   1. Mixed hyperlipidemia  Comprehensive Metabolic Panel    Lipid Panel      2. Abdominal distress  Comprehensive Metabolic Panel    CBC with Auto Differential    ZAID - Jessica Dillon MD, Gastroenterology (ERCP & EUS), [facility]      3. Need for Tdap vaccination  Tdap, BOOSTRIX, (age 8 yrs+), IM      4. Erectile dysfunction, unspecified erectile dysfunction type  AFL - Bella Lesches, MD, Urology, [facility] on the [facility]  He still wants to check yearly  Discussed that not indicated  Despite being upto date he wanted the whooping cough   He is not around little kids  He wanted to see the gi for the abd despite feeling well     Visit with cardiology noted from 8/17/22.  Reviewed stable       Plan:      Continue the current diet and the medications  See in March  Call for any problems or concerns   See dr Adriane Ramirez for the stomach concerns   See dr Patito Sandoval for the erection problems         Jessica Willis MD

## 2022-08-22 NOTE — PATIENT INSTRUCTIONS
Continue the current diet and the medications  See in March  Call for any problems or concerns   See dr Gil Rowe for the stomach concerns   See dr Luisito Wills for the erection problems

## 2022-08-22 NOTE — PROGRESS NOTES
Medicare Annual Wellness Visit    Yves Robbins is here for Medicare AWV    Assessment & Plan   Medicare annual wellness visit, subsequent    Recommendations for Preventive Services Due: see orders and patient instructions/AVS.  Recommended screening schedule for the next 5-10 years is provided to the patient in written form: see Patient Instructions/AVS.     Return for Medicare Annual Wellness Visit in 1 year. Subjective       Patient's complete Health Risk Assessment and screening values have been reviewed and are found in Flowsheets. The following problems were reviewed today and where indicated follow up appointments were made and/or referrals ordered. Has a 5 acre garden, works in 6 days a week. States he is concerned about falling due to uneven ground. States he trys to be very aware of his surroundings and where he is stepping. States he exercises 5 times a week. Strength training. Positive Risk Factor Screenings with Interventions:    Fall Risk:  Do you feel unsteady or are you worried about falling? : (!) yes  2 or more falls in past year?: (!) yes  Fall with injury in past year?: no   Fall Risk Interventions:    Home exercises provided to promote strength and balance            General Health and ACP:  General  In general, how would you say your health is?: Very Good  In the past 7 days, have you experienced any of the following: New or Increased Pain, New or Increased Fatigue, Loneliness, Social Isolation, Stress or Anger?: (!) Yes  Select all that apply: (!) New or Increased Fatigue  Do you get the social and emotional support that you need?: Yes  Do you have a Living Will?: Yes  Fatigued last week, some improvement this week. Discussed PCP, referred.    Advance Directives       Power of [address] [place] [address] Street Will ACP-Advance Directive ACP-Power of     Not on File Not on File Not on File Not on File        General Health Risk Interventions:  No Living Will: ACP documents already completed- patient asked to provide copy to the office              Objective   Vitals:    08/22/22 0829   BP: 122/80   Site: Left Upper Arm   Position: Sitting   Cuff Size: Medium Adult   Temp: 97.2 °F (36.2 °C)   TempSrc: Temporal   Weight: 172 lb (78 kg)   Height: 5' 5.5\" (1.664 m)      Body mass index is 28.19 kg/m². No Known Allergies  Prior to Visit Medications    Medication Sig Taking? Authorizing Provider   pravastatin (PRAVACHOL) 40 MG tablet TAKE 1 TABLET BY MOUTH EVERY EVENING Yes Alberto Carrera MD   metoprolol succinate (TOPROL XL) 50 MG extended release tablet Take 1 tablet by mouth daily Yes Alberto Carrera MD   mometasone (ELOCON) 0.1 % cream Apply topically daily. Yes Alberto Carrera MD   clotrimazole (LOTRIMIN) 1 % cream Apply topically 2 times daily Apply topically 2 times daily. For 2 week Yes Alberto Carrera MD   aspirin 325 MG EC tablet Take 1 tablet by mouth daily Yes Ruben Barker MD   Acetaminophen 650 MG TABS Take by mouth 2 times daily  Yes Historical Provider, MD   Glucosamine-Chondroitin-MSM (TRIPLE FLEX PO) Take by mouth 3 times daily  Yes Historical Provider, MD   Multiple Vitamins-Minerals (MADHU MULTIVITAMIN FOR MEN PO) Take  by mouth. For memory takes 1 tablet Yes Historical Provider, MD MORENO BOBO Take by mouth 2 times daily  Yes Historical Provider, MD Gómez (Including outside providers/suppliers regularly involved in providing care):   Patient Care Team:  Alberto Carrera MD as PCP - General (Family Medicine)  Alberto Carrera MD as PCP - REHABILITATION [facility] Empaneled Provider  Dr. Lisa Forrest, Cardiology  Dr. Ifrah Moreira, GI   Reviewed and updated this visit:  Tobacco  Allergies  Meds  Problems  Med Hx  Surg Hx  Soc Hx  Fam Hx               Advance Care Planning   Advanced Care Planning: Discussed the patients choices for care and treatment in case of a health event that adversely affects decision-making abilities. Asked pt to bring in copy of completed forms.   Time spent (minutes): 5 Cardiovascular Disease Risk Counseling: Assessed the patient's risk to develop cardiovascular disease and reviewed main risk factors. Reviewed steps to reduce disease risk including:   Quitting tobacco use, reducing amount smoked, or not starting the habit  Making healthy food choices  Being physically active and gradualy increasing activity levels   Reduce weight and determine a healthy BMI goal  Monitor blood pressure and treat if higher than 140/90 mmHg  Maintain blood total cholesterol levels under 5 mmol/l or 190 mg/dl  Maintain LDL cholesterol levels under 3.0 mmol/l or 115 mg/dl   Control blood glucose levels  Consider taking aspirin (75 mg daily), once blood pressure is controlled   Provided a follow up plan.   Time spent (minutes): 5

## 2022-08-25 DIAGNOSIS — E78.2 MIXED HYPERLIPIDEMIA: ICD-10-CM

## 2022-08-25 DIAGNOSIS — R10.9 ABDOMINAL DISTRESS: ICD-10-CM

## 2022-08-25 LAB
A/G RATIO: 2.4 (ref 1.1–2.2)
ALBUMIN SERPL-MCNC: 4.5 G/DL (ref 3.4–5)
ALP BLD-CCNC: 63 U/L (ref 40–129)
ALT SERPL-CCNC: 17 U/L (ref 10–40)
ANION GAP SERPL CALCULATED.3IONS-SCNC: 9 MMOL/L (ref 3–16)
AST SERPL-CCNC: 23 U/L (ref 15–37)
BASOPHILS ABSOLUTE: 0.1 K/UL (ref 0–0.2)
BASOPHILS RELATIVE PERCENT: 1.1 %
BILIRUB SERPL-MCNC: 0.4 MG/DL (ref 0–1)
BUN BLDV-MCNC: 15 MG/DL (ref 7–20)
CALCIUM SERPL-MCNC: 9 MG/DL (ref 8.3–10.6)
CHLORIDE BLD-SCNC: 107 MMOL/L (ref 99–110)
CHOLESTEROL, TOTAL: 161 MG/DL (ref 0–199)
CO2: 24 MMOL/L (ref 21–32)
CREAT SERPL-MCNC: 1 MG/DL (ref 0.8–1.3)
EOSINOPHILS ABSOLUTE: 0.3 K/UL (ref 0–0.6)
EOSINOPHILS RELATIVE PERCENT: 5.3 %
GFR AFRICAN AMERICAN: >60
GFR NON-AFRICAN AMERICAN: >60
GLUCOSE BLD-MCNC: 94 MG/DL (ref 70–99)
HCT VFR BLD CALC: 41.8 % (ref 40.5–52.5)
HDLC SERPL-MCNC: 51 MG/DL (ref 40–60)
HEMOGLOBIN: 14.1 G/DL (ref 13.5–17.5)
LDL CHOLESTEROL CALCULATED: 85 MG/DL
LYMPHOCYTES ABSOLUTE: 2.1 K/UL (ref 1–5.1)
LYMPHOCYTES RELATIVE PERCENT: 38.5 %
MCH RBC QN AUTO: 31.9 PG (ref 26–34)
MCHC RBC AUTO-ENTMCNC: 33.8 G/DL (ref 31–36)
MCV RBC AUTO: 94.2 FL (ref 80–100)
MONOCYTES ABSOLUTE: 0.6 K/UL (ref 0–1.3)
MONOCYTES RELATIVE PERCENT: 11.8 %
NEUTROPHILS ABSOLUTE: 2.3 K/UL (ref 1.7–7.7)
NEUTROPHILS RELATIVE PERCENT: 43.3 %
PDW BLD-RTO: 13.8 % (ref 12.4–15.4)
PLATELET # BLD: 201 K/UL (ref 135–450)
PMV BLD AUTO: 7.8 FL (ref 5–10.5)
POTASSIUM SERPL-SCNC: 4.7 MMOL/L (ref 3.5–5.1)
RBC # BLD: 4.44 M/UL (ref 4.2–5.9)
SODIUM BLD-SCNC: 140 MMOL/L (ref 136–145)
TOTAL PROTEIN: 6.4 G/DL (ref 6.4–8.2)
TRIGL SERPL-MCNC: 124 MG/DL (ref 0–150)
VLDLC SERPL CALC-MCNC: 25 MG/DL
WBC # BLD: 5.4 K/UL (ref 4–11)

## 2022-10-14 ENCOUNTER — OFFICE VISIT (OUTPATIENT)
Dept: FAMILY MEDICINE CLINIC | Age: 77
End: 2022-10-14
Payer: MEDICARE

## 2022-10-14 VITALS
DIASTOLIC BLOOD PRESSURE: 84 MMHG | WEIGHT: 181 LBS | SYSTOLIC BLOOD PRESSURE: 126 MMHG | BODY MASS INDEX: 29.09 KG/M2 | HEIGHT: 66 IN | TEMPERATURE: 98.6 F

## 2022-10-14 DIAGNOSIS — S91.135A PUNCTURE WOUND OF FIFTH TOE OF LEFT FOOT, INITIAL ENCOUNTER: Primary | ICD-10-CM

## 2022-10-14 PROCEDURE — G8417 CALC BMI ABV UP PARAM F/U: HCPCS | Performed by: INTERNAL MEDICINE

## 2022-10-14 PROCEDURE — 99213 OFFICE O/P EST LOW 20 MIN: CPT | Performed by: INTERNAL MEDICINE

## 2022-10-14 PROCEDURE — 1123F ACP DISCUSS/DSCN MKR DOCD: CPT | Performed by: INTERNAL MEDICINE

## 2022-10-14 PROCEDURE — G8484 FLU IMMUNIZE NO ADMIN: HCPCS | Performed by: INTERNAL MEDICINE

## 2022-10-14 PROCEDURE — G8427 DOCREV CUR MEDS BY ELIG CLIN: HCPCS | Performed by: INTERNAL MEDICINE

## 2022-10-14 PROCEDURE — 3017F COLORECTAL CA SCREEN DOC REV: CPT | Performed by: INTERNAL MEDICINE

## 2022-10-14 PROCEDURE — 1036F TOBACCO NON-USER: CPT | Performed by: INTERNAL MEDICINE

## 2022-10-14 RX ORDER — CEPHALEXIN 500 MG/1
500 CAPSULE ORAL 3 TIMES DAILY
Qty: 30 CAPSULE | Refills: 0 | Status: SHIPPED | OUTPATIENT
Start: 2022-10-14

## 2022-10-14 ASSESSMENT — ENCOUNTER SYMPTOMS
COUGH: 0
SINUS PAIN: 0
APNEA: 0
SHORTNESS OF BREATH: 0
ABDOMINAL PAIN: 0

## 2022-10-14 NOTE — PROGRESS NOTES
David Singer (:  1945) is a 78 y.o. male,Established patient, here for evaluation of the following chief complaint(s): Foot Injury (Patient c/o left foot injury on 10/8/2022. Patient stepped on a thorn)  David Singer is a 78 y.o. male with the following history as recorded in [facility]:  Patient Active Problem List    Diagnosis Date Noted    Syncope and collapse 2020    Calcific tendinitis of shoulder 2014    Cognitive dysfunction 10/22/2013    ADHD (attention deficit hyperactivity disorder), combined type 10/22/2013    Family history of dementia 10/22/2013    Atrial flutter ([facility] [address] 2013    Palpitations     Atrial fibrillation (HCC) 2013    Mixed hyperlipidemia      Current Outpatient Medications   Medication Sig Dispense Refill    cephALEXin (KEFLEX) 500 MG capsule Take 1 capsule by mouth 3 times daily 30 capsule 0    pravastatin (PRAVACHOL) 40 MG tablet TAKE 1 TABLET BY MOUTH EVERY EVENING 90 tablet 1    metoprolol succinate (TOPROL XL) 50 MG extended release tablet Take 1 tablet by mouth daily 90 tablet 3    mometasone (ELOCON) 0.1 % cream Apply topically daily. 60 g 0    clotrimazole (LOTRIMIN) 1 % cream Apply topically 2 times daily Apply topically 2 times daily. For 2 week 45 g 0    aspirin 325 MG EC tablet Take 1 tablet by mouth daily 30 tablet 3    Acetaminophen 650 MG TABS Take by mouth 2 times daily       Glucosamine-Chondroitin-MSM (TRIPLE FLEX PO) Take by mouth 3 times daily       Multiple Vitamins-Minerals (MADHU MULTIVITAMIN FOR MEN PO) Take  by mouth. For memory takes 1 tablet      SAW PALMETTO Take by mouth 2 times daily        No current facility-administered medications for this visit. Allergies: Patient has no known allergies.   Past Medical History:   Diagnosis Date    Atrial fibrillation ([facility] [address]     new onset    Kidney stone     Mixed hyperlipidemia     managed by PCP    Near syncope     Palpitations     Prostate enlargement     Syncope and collapse      Past Surgical History:   Procedure Laterality Date    COLONOSCOPY  9/26/2001    negative, deak, also neg sigmoid 2007    COLONOSCOPY  2/15/2011    negative dr Mesfin Allen    COLONOSCOPY  08/23/2021    dr Dobbs Cancer, polyps, repeat in 5 years    KIDNEY STONE SURGERY       Family History   Problem Relation Age of Onset    Rheum Arthritis Brother     Heart Disease Brother     Heart Disease Mother         older age    Dementia Father      Social History     Tobacco Use    Smoking status: Never    Smokeless tobacco: Never   Substance Use Topics    Alcohol use: Not Currently     Alcohol/week: 0.0 standard drinks     Comment: minimal caffeine occasional wine           ASSESSMENT/PLAN:   Diagnosis Orders   1. Puncture wound of fifth toe of left foot, initial encounter           Outpatient Encounter Medications as of 10/14/2022   Medication Sig Dispense Refill    cephALEXin (KEFLEX) 500 MG capsule Take 1 capsule by mouth 3 times daily 30 capsule 0    pravastatin (PRAVACHOL) 40 MG tablet TAKE 1 TABLET BY MOUTH EVERY EVENING 90 tablet 1    metoprolol succinate (TOPROL XL) 50 MG extended release tablet Take 1 tablet by mouth daily 90 tablet 3    mometasone (ELOCON) 0.1 % cream Apply topically daily. 60 g 0    clotrimazole (LOTRIMIN) 1 % cream Apply topically 2 times daily Apply topically 2 times daily. For 2 week 45 g 0    aspirin 325 MG EC tablet Take 1 tablet by mouth daily 30 tablet 3    Acetaminophen 650 MG TABS Take by mouth 2 times daily       Glucosamine-Chondroitin-MSM (TRIPLE FLEX PO) Take by mouth 3 times daily       Multiple Vitamins-Minerals (MADHU MULTIVITAMIN FOR MEN PO) Take  by mouth. For memory takes 1 tablet      SAW PALMETTO Take by mouth 2 times daily        No facility-administered encounter medications on file as of 10/14/2022. No orders of the defined types were placed in this encounter. Refer to podiatry today .      Cephalexin 500 mg tid x 10 d    Subjective   SUBJECTIVE/OBJECTIVE:  HPI   Chief Complaint Patient presents with    Foot Injury     Patient c/o left foot injury on 10/8/2022. Patient stepped on a thorn    Brigitte Jaramillo is a 78 y.o. male with the following history as recorded in [facility]:  Patient Active Problem List    Diagnosis Date Noted    Syncope and collapse 03/04/2020    Calcific tendinitis of shoulder 12/17/2014    Cognitive dysfunction 10/22/2013    ADHD (attention deficit hyperactivity disorder), combined type 10/22/2013    Family history of dementia 10/22/2013    Atrial flutter ([address] 02/04/2013    Palpitations     Atrial fibrillation (HCC) 01/16/2013    Mixed hyperlipidemia      Current Outpatient Medications   Medication Sig Dispense Refill    pravastatin (PRAVACHOL) 40 MG tablet TAKE 1 TABLET BY MOUTH EVERY EVENING 90 tablet 1    metoprolol succinate (TOPROL XL) 50 MG extended release tablet Take 1 tablet by mouth daily 90 tablet 3    mometasone (ELOCON) 0.1 % cream Apply topically daily. 60 g 0    clotrimazole (LOTRIMIN) 1 % cream Apply topically 2 times daily Apply topically 2 times daily. For 2 week 45 g 0    aspirin 325 MG EC tablet Take 1 tablet by mouth daily 30 tablet 3    Acetaminophen 650 MG TABS Take by mouth 2 times daily       Glucosamine-Chondroitin-MSM (TRIPLE FLEX PO) Take by mouth 3 times daily       Multiple Vitamins-Minerals (MADHU MULTIVITAMIN FOR MEN PO) Take  by mouth. For memory takes 1 tablet      SAW PALMETTO Take by mouth 2 times daily        No current facility-administered medications for this visit. Allergies: Patient has no known allergies.   Past Medical History:   Diagnosis Date    Atrial fibrillation [facility])     new onset    Kidney stone     Mixed hyperlipidemia     managed by PCP    Near syncope     Palpitations     Prostate enlargement     Syncope and collapse      Past Surgical History:   Procedure Laterality Date    COLONOSCOPY  9/26/2001    negative, deak, also neg sigmoid 2007    COLONOSCOPY  2/15/2011    negative dr Fuentes Myles    COLONOSCOPY  08/23/2021 dr Gisela Rosado, polyps, repeat in 5 years    KIDNEY STONE SURGERY       Family History   Problem Relation Age of Onset    Rheum Arthritis Brother     Heart Disease Brother     Heart Disease Mother         older age    Dementia Father      Social History     Tobacco Use    Smoking status: Never    Smokeless tobacco: Never   Substance Use Topics    Alcohol use: Not Currently     Alcohol/week: 0.0 standard drinks     Comment: minimal caffeine occasional wine        Review of Systems   Constitutional:  Negative for chills, diaphoresis and fatigue. HENT:  Negative for congestion, postnasal drip and sinus pain. Eyes:  Negative for visual disturbance. Respiratory:  Negative for apnea, cough and shortness of breath. Cardiovascular:  Negative for chest pain and palpitations. Gastrointestinal:  Negative for abdominal pain. Genitourinary:  Negative for dysuria and frequency. Skin:         Patient presents with: Foot Injury: Patient c/o left foot injury on 10/8/2022. Patient stepped on a thorn      Neurological:  Negative for dizziness and headaches. Objective   Physical Exam  Vitals reviewed. Constitutional:       Appearance: Normal appearance. HENT:      Head: Normocephalic and atraumatic. Cardiovascular:      Rate and Rhythm: Normal rate and regular rhythm. Heart sounds: No murmur heard. Pulmonary:      Effort: No respiratory distress. Breath sounds: No wheezing. Abdominal:      Tenderness: There is no abdominal tenderness. Skin:     Coloration: Skin is not jaundiced. Comments: Puncture wound L foot mild erythema . Tender upon palpation   Neurological:      Mental Status: He is alert.                 An electronic signature was used to authenticate this note.    --Zaina Paz DO

## 2022-12-01 ENCOUNTER — OFFICE VISIT (OUTPATIENT)
Dept: CARDIOLOGY CLINIC | Age: 77
End: 2022-12-01
Payer: MEDICARE

## 2022-12-01 VITALS
BODY MASS INDEX: 28.45 KG/M2 | OXYGEN SATURATION: 96 % | HEART RATE: 83 BPM | HEIGHT: 66 IN | SYSTOLIC BLOOD PRESSURE: 118 MMHG | DIASTOLIC BLOOD PRESSURE: 74 MMHG | WEIGHT: 177 LBS

## 2022-12-01 DIAGNOSIS — E78.5 HYPERLIPIDEMIA LDL GOAL <130: ICD-10-CM

## 2022-12-01 DIAGNOSIS — I48.19 PERSISTENT ATRIAL FIBRILLATION (HCC): Primary | ICD-10-CM

## 2022-12-01 PROCEDURE — G8427 DOCREV CUR MEDS BY ELIG CLIN: HCPCS | Performed by: INTERNAL MEDICINE

## 2022-12-01 PROCEDURE — 1123F ACP DISCUSS/DSCN MKR DOCD: CPT | Performed by: INTERNAL MEDICINE

## 2022-12-01 PROCEDURE — 1036F TOBACCO NON-USER: CPT | Performed by: INTERNAL MEDICINE

## 2022-12-01 PROCEDURE — 3017F COLORECTAL CA SCREEN DOC REV: CPT | Performed by: INTERNAL MEDICINE

## 2022-12-01 PROCEDURE — G8484 FLU IMMUNIZE NO ADMIN: HCPCS | Performed by: INTERNAL MEDICINE

## 2022-12-01 PROCEDURE — 99214 OFFICE O/P EST MOD 30 MIN: CPT | Performed by: INTERNAL MEDICINE

## 2022-12-01 PROCEDURE — 93000 ELECTROCARDIOGRAM COMPLETE: CPT | Performed by: INTERNAL MEDICINE

## 2022-12-01 PROCEDURE — G8417 CALC BMI ABV UP PARAM F/U: HCPCS | Performed by: INTERNAL MEDICINE

## 2022-12-01 NOTE — PROGRESS NOTES
[address]  [id number]    December 1, 2022    CC: \"My breathing is doing good. \"     HPI:  The patient is 78 y.o. male with a past medical history significant for atrial fibrillation, hyperlipidemia and hypothyroidism. Dr. Leonard Vo for chronic venous insufficiency s/p Varithena. He presents today for follow up. He feels that his LLE veins s/p Maria A Cockayne has improved and very happy with the results. He currently  reports his breathing is \"doing very well. \" The patient currently denies any cardiac sounding complaints. The patient denies exertional chest pain/pressure, dyspnea at rest, DE SOUZA, PND, orthopnea, palpitations, lightheadedness, weight changes, changes in LE edema, and syncope. He admits to medical therapy compliance and feels he has continued to tolerate. Review of Systems:  Constitutional: No fatigue, weakness, night sweats or fever. HEENT: No new vision difficulties or ringing in the ears. Respiratory: No new SOB, PND, orthopnea or cough. Cardiovascular: See HPI   GI: No n/v, diarrhea, constipation, abdominal pain or changes in bowel habits. No melena, no hematochezia  : No urinary frequency, urgency, incontinence, hematuria or dysuria. Skin: No cyanosis or skin lesions. Musculoskeletal: No new muscle or joint pain. Neurological: No syncope or TIA-like symptoms.   Psychiatric: No anxiety, insomnia or depression     Past Medical History:   Diagnosis Date    Atrial fibrillation ([address]     new onset    Kidney stone     Mixed hyperlipidemia     managed by PCP    Near syncope     Palpitations     Prostate enlargement     Syncope and collapse      Past Surgical History:   Procedure Laterality Date    COLONOSCOPY  9/26/2001    negative, deak, also neg sigmoid 2007    COLONOSCOPY  2/15/2011    negative dr Edwards December    COLONOSCOPY  08/23/2021    dr Long Delay, polyps, repeat in 5 years    KIDNEY STONE SURGERY       Family History   Problem Relation Age of Onset Rheum Arthritis Brother     Heart Disease Brother     Heart Disease Mother         older age    Dementia Father      Social History     Tobacco Use    Smoking status: Never    Smokeless tobacco: Never   Vaping Use    Vaping Use: Never used   Substance Use Topics    Alcohol use: Not Currently     Alcohol/week: 0.0 standard drinks     Comment: minimal caffeine occasional wine    Drug use: No       No Known Allergies  Current Outpatient Medications   Medication Sig Dispense Refill    cephALEXin (KEFLEX) 500 MG capsule Take 1 capsule by mouth 3 times daily 30 capsule 0    pravastatin (PRAVACHOL) 40 MG tablet TAKE 1 TABLET BY MOUTH EVERY EVENING 90 tablet 1    metoprolol succinate (TOPROL XL) 50 MG extended release tablet Take 1 tablet by mouth daily 90 tablet 3    mometasone (ELOCON) 0.1 % cream Apply topically daily. 60 g 0    clotrimazole (LOTRIMIN) 1 % cream Apply topically 2 times daily Apply topically 2 times daily. For 2 week 45 g 0    aspirin 325 MG EC tablet Take 1 tablet by mouth daily 30 tablet 3    Acetaminophen 650 MG TABS Take by mouth 2 times daily       Glucosamine-Chondroitin-MSM (TRIPLE FLEX PO) Take by mouth 3 times daily       Multiple Vitamins-Minerals (MADHU MULTIVITAMIN FOR MEN PO) Take  by mouth. For memory takes 1 tablet      SAW PALMETTO Take by mouth 2 times daily        No current facility-administered medications for this visit.      [id number] Score for Atrial Fibrillation Stroke Risk   Risk   Factors  Component Value   C CHF No 0   H HTN No 0   A2 Age >= 76 Yes,  (71 y.o.) 2   D DM No 0   S2 Prior Stroke/TIA No 0   V Vascular Disease No 0   A Age 74-69 No,  (71 y.o.) 0   Sc Sex male 0    [id number]  Score  2   Score last updated 4/14/21 7:24 AM EDT    Click here for a link to the UpToDate guideline \"Atrial Fibrillation: Anticoagulation therapy to prevent embolization    Disclaimer: Risk Score calculation is dependent on accuracy of patient problem list and past encounter diagnosis. Physical Exam:   /74   Pulse 83   Ht 5' 5.5\" (1.664 m)   Wt 177 lb (80.3 kg)   SpO2 96%   BMI 29.01 kg/m²   No intake or output data in the 24 hours ending 12/01/22 0916  Wt Readings from Last 2 Encounters:   12/01/22 177 lb (80.3 kg)   10/14/22 181 lb (82.1 kg)     Constitutional: He is oriented to person, place, and time. He appears well-developed and well-nourished. In no acute distress. Head: Normocephalic and atraumatic. Neck: Neck supple. No JVD present. Carotid bruit is not present. No mass and no thyromegaly present. No lymphadenopathy present. Cardiovascular: Irreg irreg, normal heart sounds and intact distal pulses. Exam reveals no gallop and no friction rub. No murmur heard. Pulmonary/Chest: Effort normal and breath sounds normal. No respiratory distress. He has no wheezes, rhonchi or rales. Abdominal: Soft, non-tender. Bowel sounds and aorta are normal. He exhibits no organomegaly, mass or bruit. Extremities: No edema, cyanosis, or clubbing. Pulses are 2+ radial/carotid/dorsalis pedis and posterior tibial bilaterally. +varicosities on left lower leg   Neurological: He is alert and oriented to person, place, and time. He has normal reflexes. No cranial nerve deficit. Coordination normal.   Skin: Skin is warm and dry. There is no rash or diaphoresis. Psychiatric: He has a normal mood and affect. His speech is normal and behavior is normal.     Personally reviewed and interpreted   EKG Interpretation 4/15/21: Atrial fibrillation  EKG Interpretation 12/2/21: Atrial fibrillation at 79 bpm     Imaging:     Echo 1/22/13  IMPRESSION:  Normal left ventricular size and systolic function. Estimated  ejection fraction is 60%. There is mild mitral and tricuspid regurgitation,  trivial pulmonic regurgitation. Normal PA systolic pressure and mild left  atrial enlargement. Stress perfusion 10/27/21  Normal stress myocardial perfusion.     Overall findings represent a low risk scan. Lab Review:   Lab Results   Component Value Date/Time    TRIG 124 08/25/2022 09:04 AM    HDL 51 08/25/2022 09:04 AM    HDL 57 01/11/2012 08:42 AM    LDLCALC 85 08/25/2022 09:04 AM    LABVLDL 25 08/25/2022 09:04 AM     Lab Results   Component Value Date/Time     08/25/2022 09:04 AM    K 4.7 08/25/2022 09:04 AM    BUN 15 08/25/2022 09:04 AM    CREATININE 1.0 08/25/2022 09:04 AM       Assessment:  1. Atrial fibrillation, persistent   2. Hyperlipidemia with LDL goal <130 mg/dL     Plan:  I think that Mr. Selena Livingston  is entirely stable from a cardiovascular standpoint. He continues in rate controlled atrial fibrillation today by EKG. His CHADS Vasc is now 2 possibly 3 with his vascular disease. We discussed anticoagulation therapy versus stoke risk. I have recommended switching his aspirin 325 mg daily to Xarelto 20 mg daily. I have answered all questions and concerns at this time. He would like to further discuss this with his wife and contact our office. I see no need to make any other changes in his medical regimen or pursue further testing at this time. I have encouraged him to continue his aerobic activity and adhere to a heart healthy diet. I have personally reviewed all previous testing for this visit today including imaging, lab results and EKG as detailed above. I will see him in the office for follow up in 1 year. This note was scribed in the presence of Dr. Yesy Richard MD by Najma Rowland RN. Physician Attestation:  The scribes documentation has been prepared under my direction and personally reviewed by me in its entirety. I, Dr. Michelle Espino personally performed the services described in this documentation as scribed by my RN in my presence, and I confirm that the note above accurately reflects all work, treatment, procedures, and medical decision making performed by me.

## 2022-12-01 NOTE — PATIENT INSTRUCTIONS
Please call the office to let us know when you are ready to start blood thinner/anticoagulation therapy for atrial fibrillation. Dr. Christopher Coto recommendations is starting Xarelto/rivaroxaban 20 mg daily. Education provided.

## 2023-01-24 ENCOUNTER — HOSPITAL ENCOUNTER (OUTPATIENT)
Dept: GENERAL RADIOLOGY | Age: 78
Discharge: HOME | End: 2023-01-24
Payer: MEDICARE

## 2023-01-24 ENCOUNTER — HOSPITAL ENCOUNTER (OUTPATIENT)
Age: 78
Discharge: HOME | End: 2023-01-24
Payer: MEDICARE

## 2023-01-24 ENCOUNTER — OFFICE VISIT (OUTPATIENT)
Dept: FAMILY MEDICINE CLINIC | Age: 78
End: 2023-01-24

## 2023-01-24 VITALS
SYSTOLIC BLOOD PRESSURE: 124 MMHG | HEIGHT: 66 IN | TEMPERATURE: 97.2 F | BODY MASS INDEX: 28.28 KG/M2 | DIASTOLIC BLOOD PRESSURE: 80 MMHG | WEIGHT: 176 LBS

## 2023-01-24 DIAGNOSIS — R05.2 SUBACUTE COUGH: Primary | ICD-10-CM

## 2023-01-24 DIAGNOSIS — R05.2 SUBACUTE COUGH: ICD-10-CM

## 2023-01-24 PROCEDURE — 71046 X-RAY EXAM CHEST 2 VIEWS: CPT

## 2023-01-24 RX ORDER — ALBUTEROL SULFATE 90 UG/1
2 AEROSOL, METERED RESPIRATORY (INHALATION) 4 TIMES DAILY PRN
Qty: 18 G | Refills: 0 | Status: SHIPPED | OUTPATIENT
Start: 2023-01-24

## 2023-01-24 SDOH — ECONOMIC STABILITY: FOOD INSECURITY: WITHIN THE PAST 12 MONTHS, THE FOOD YOU BOUGHT JUST DIDN'T LAST AND YOU DIDN'T HAVE MONEY TO GET MORE.: NEVER TRUE

## 2023-01-24 SDOH — ECONOMIC STABILITY: TRANSPORTATION INSECURITY
IN THE PAST 12 MONTHS, HAS THE LACK OF TRANSPORTATION KEPT YOU FROM MEDICAL APPOINTMENTS OR FROM GETTING MEDICATIONS?: NO

## 2023-01-24 SDOH — ECONOMIC STABILITY: FOOD INSECURITY: WITHIN THE PAST 12 MONTHS, YOU WORRIED THAT YOUR FOOD WOULD RUN OUT BEFORE YOU GOT MONEY TO BUY MORE.: NEVER TRUE

## 2023-01-24 SDOH — ECONOMIC STABILITY: TRANSPORTATION INSECURITY
IN THE PAST 12 MONTHS, HAS LACK OF TRANSPORTATION KEPT YOU FROM MEETINGS, WORK, OR FROM GETTING THINGS NEEDED FOR DAILY LIVING?: NO

## 2023-01-24 ASSESSMENT — PATIENT HEALTH QUESTIONNAIRE - PHQ9
1. LITTLE INTEREST OR PLEASURE IN DOING THINGS: 0
SUM OF ALL RESPONSES TO PHQ QUESTIONS 1-9: 0
SUM OF ALL RESPONSES TO PHQ9 QUESTIONS 1 & 2: 0
2. FEELING DOWN, DEPRESSED OR HOPELESS: 0

## 2023-01-24 ASSESSMENT — SOCIAL DETERMINANTS OF HEALTH (SDOH): HOW HARD IS IT FOR YOU TO PAY FOR THE VERY BASICS LIKE FOOD, HOUSING, MEDICAL CARE, AND HEATING?: NOT HARD AT ALL

## 2023-01-24 NOTE — PROGRESS NOTES
Subjective:      Patient ID: Harsh Greco is a 78 y.o. male. Chief Complaint   Patient presents with    Follow-Up from Hospital     [facility]) - Bronchitis x 3 weeks        Patient presents with: Follow-Up from Hospital: Urgent [facility]) - Bronchitis x 3 weeks    Here for the above  Seen on last week for the sx    Cough is dry  No fever  No sore throat or minimal   No ear pain   No  head congestion  He notes some improvement from last week     YOB: 1945    Date of Visit:  1/24/2023    No Known Allergies    Current Outpatient Medications:  c  pravastatin (PRAVACHOL) 40 MG tablet, TAKE 1 TABLET BY MOUTH EVERY EVENING, Disp: 90 tablet, Rfl: 1  metoprolol succinate (TOPROL XL) 50 MG extended release tablet, Take 1 tablet by mouth daily, Disp: 90 tablet, Rfl: 3  mometasone (ELOCON) 0.1 % cream, Apply topically daily. , Disp: 60 g, Rfl: 0  clotrimazole (LOTRIMIN) 1 % cream, Apply topically 2 times daily Apply topically 2 times daily. For 2 week, Disp: 45 g, Rfl: 0  aspirin 325 MG EC tablet, Take 1 tablet by mouth daily, Disp: 30 tablet, Rfl: 3  Acetaminophen 650 MG TABS, Take by mouth 2 times daily , Disp: , Rfl:   Glucosamine-Chondroitin-MSM (TRIPLE FLEX PO), Take by mouth 3 times daily , Disp: , Rfl:   Multiple Vitamins-Minerals (MADHU MULTIVITAMIN FOR MEN PO), Take  by mouth. For memory takes 1 tablet, Disp: , Rfl:   SAW PALMETTO, Take by mouth 2 times daily , Disp: , Rfl:     No current facility-administered medications for this visit.      ---------------------------               01/24/23                      0900         ---------------------------   BP:          124/80         Site:    Left Upper Arm     Position:     Sitting        Cuff Size:   Large Adult      Temp:   97.2 °F (36.2 °C)   TempSrc:    Temporal        Weight: 176 lb (79.8 kg)    Height: 5' 5.5\" (1.664 m)  ---------------------------  Body mass index is 28.84 kg/m².      Wt Readings from Last 3 Encounters:  01/24/23 : 176 lb (79.8 kg)  12/01/22 : 177 lb (80.3 kg)  10/14/22 : 181 lb (82.1 kg)    BP Readings from Last 3 Encounters:  01/24/23 : 124/80  12/01/22 : 118/74  10/14/22 : 126/84          Review of Systems    Objective:   Physical Exam  Constitutional:       General: He is not in acute distress. Appearance: Normal appearance. He is well-developed. He is not ill-appearing or diaphoretic. HENT:      Head: Normocephalic and atraumatic. Right Ear: Tympanic membrane and ear canal normal.      Left Ear: Tympanic membrane and ear canal normal.      Nose: Nose normal.      Mouth/Throat:      Lips: Pink. Mouth: Mucous membranes are moist. No oral lesions. Pharynx: Oropharynx is clear. Uvula midline. Pulmonary:      Effort: Pulmonary effort is normal. No tachypnea, accessory muscle usage or respiratory distress. Breath sounds: Normal breath sounds. No decreased breath sounds, wheezing, rhonchi or rales. Musculoskeletal:      Cervical back: Neck supple. Lymphadenopathy:      Head:      Right side of head: No submental or submandibular adenopathy. Left side of head: No submental or submandibular adenopathy. Cervical: No cervical adenopathy. Upper Body:      Right upper body: No supraclavicular adenopathy. Left upper body: No supraclavicular adenopathy. Skin:     General: Skin is warm and dry. Coloration: Skin is not pale. Neurological:      Mental Status: He is alert. Assessment:       Diagnosis Orders   1. Subacute cough  XR CHEST (2 VW)          Reviewed the paper work  Given steroid, doxycycline, ipratropium via nebulizer and tessalon   Xray reported as ok   But on the chart material  I have from him it was read by the PA at the clinic. I do not see it was a radiology read    Appears well but I need a result on a current chest film.      Orders Placed This Encounter   Medications    albuterol sulfate HFA (VENTOLIN HFA) 108 (90 Base) MCG/ACT inhaler     Sig: Inhale 2 puffs into the lungs 4 times daily as needed for Wheezing (cough) Dispense with a spacing device     Dispense:  18 g     Refill:  0           Plan:      Do the chest film  Use the albuterol inhaler  Use robitussin DM for the cough         Xiang Mendosa MD

## 2023-01-30 ENCOUNTER — TELEPHONE (OUTPATIENT)
Dept: FAMILY MEDICINE CLINIC | Age: 78
End: 2023-01-30

## 2023-01-30 RX ORDER — AMOXICILLIN AND CLAVULANATE POTASSIUM 875; 125 MG/1; MG/1
1 TABLET, FILM COATED ORAL 2 TIMES DAILY
Qty: 20 TABLET | Refills: 0 | Status: SHIPPED | OUTPATIENT
Start: 2023-01-30 | End: 2023-02-09

## 2023-01-30 RX ORDER — PREDNISONE 10 MG/1
TABLET ORAL
Qty: 16 TABLET | Refills: 0 | Status: SHIPPED | OUTPATIENT
Start: 2023-01-30

## 2023-01-30 NOTE — TELEPHONE ENCOUNTER
I am sending in additional medicine for him to take   Antibiotic and another course of steroid  To his [facility]    Orders Placed This Encounter   Medications    amoxicillin-clavulanate (AUGMENTIN) 875-125 MG per tablet     Sig: Take 1 tablet by mouth 2 times daily for 10 days     Dispense:  20 tablet     Refill:  0    predniSONE (DELTASONE) 10 MG tablet     Sig: Prednisone 10 mg. #16. Take 2 po bid for 2 days, then 1 po bid for 3 days,  then 1 po daily for 2 days.  Then stop     Dispense:  16 tablet     Refill:  0

## 2023-01-30 NOTE — TELEPHONE ENCOUNTER
----- Message from Joselo Tse sent at 1/30/2023  9:05 AM EST -----  Subject: Appointment Request    Reason for Call: Established Patient Appointment needed: Routine Existing   Condition Follow Up    QUESTIONS    Reason for appointment request? Available appointments did not meet   patient need     Additional Information for Provider? The patient was diagnosed with   bronchitis 1/24/23 and was told if he is not better by 1/30/23 to make   another appointment. The patient is not feeling better and did not want to   wait until 2/9/23 to be seen.    ---------------------------------------------------------------------------  --------------  Darío EDMONDS  [phone]; Do not leave any message, patient will call back for answer  ---------------------------------------------------------------------------  --------------  SCRIPT ANSWERS  RAOUL Screen: Mauri Lee

## 2023-02-21 RX ORDER — METOPROLOL SUCCINATE 50 MG/1
50 TABLET, EXTENDED RELEASE ORAL DAILY
Qty: 90 TABLET | Refills: 2 | Status: SHIPPED | OUTPATIENT
Start: 2023-02-21

## 2023-02-21 RX ORDER — PRAVASTATIN SODIUM 40 MG
TABLET ORAL
Qty: 90 TABLET | Refills: 2 | Status: SHIPPED | OUTPATIENT
Start: 2023-02-21

## 2023-03-06 ENCOUNTER — OFFICE VISIT (OUTPATIENT)
Dept: FAMILY MEDICINE CLINIC | Age: 78
End: 2023-03-06

## 2023-03-06 VITALS
HEIGHT: 66 IN | HEART RATE: 88 BPM | BODY MASS INDEX: 28.28 KG/M2 | WEIGHT: 176 LBS | DIASTOLIC BLOOD PRESSURE: 76 MMHG | TEMPERATURE: 97.2 F | SYSTOLIC BLOOD PRESSURE: 128 MMHG

## 2023-03-06 DIAGNOSIS — R42 DIZZY: ICD-10-CM

## 2023-03-06 DIAGNOSIS — E78.2 MIXED HYPERLIPIDEMIA: ICD-10-CM

## 2023-03-06 DIAGNOSIS — E78.2 MIXED HYPERLIPIDEMIA: Primary | ICD-10-CM

## 2023-03-06 LAB
A/G RATIO: 2.4 (ref 1.1–2.2)
ALBUMIN SERPL-MCNC: 4.6 G/DL (ref 3.4–5)
ALP BLD-CCNC: 73 U/L (ref 40–129)
ALT SERPL-CCNC: 24 U/L (ref 10–40)
ANION GAP SERPL CALCULATED.3IONS-SCNC: 10 MMOL/L (ref 3–16)
AST SERPL-CCNC: 28 U/L (ref 15–37)
BILIRUB SERPL-MCNC: 0.5 MG/DL (ref 0–1)
BUN BLDV-MCNC: 15 MG/DL (ref 7–20)
CALCIUM SERPL-MCNC: 9.4 MG/DL (ref 8.3–10.6)
CHLORIDE BLD-SCNC: 104 MMOL/L (ref 99–110)
CHOLESTEROL, TOTAL: 197 MG/DL (ref 0–199)
CO2: 25 MMOL/L (ref 21–32)
CREAT SERPL-MCNC: 1 MG/DL (ref 0.8–1.3)
GFR SERPL CREATININE-BSD FRML MDRD: >60 ML/MIN/{1.73_M2}
GLUCOSE BLD-MCNC: 90 MG/DL (ref 70–99)
HDLC SERPL-MCNC: 60 MG/DL (ref 40–60)
LDL CHOLESTEROL CALCULATED: 122 MG/DL
POTASSIUM SERPL-SCNC: 5 MMOL/L (ref 3.5–5.1)
SODIUM BLD-SCNC: 139 MMOL/L (ref 136–145)
TOTAL PROTEIN: 6.5 G/DL (ref 6.4–8.2)
TRIGL SERPL-MCNC: 77 MG/DL (ref 0–150)
VLDLC SERPL CALC-MCNC: 15 MG/DL

## 2023-03-06 SDOH — ECONOMIC STABILITY: INCOME INSECURITY: HOW HARD IS IT FOR YOU TO PAY FOR THE VERY BASICS LIKE FOOD, HOUSING, MEDICAL CARE, AND HEATING?: NOT HARD AT ALL

## 2023-03-06 SDOH — ECONOMIC STABILITY: FOOD INSECURITY: WITHIN THE PAST 12 MONTHS, YOU WORRIED THAT YOUR FOOD WOULD RUN OUT BEFORE YOU GOT MONEY TO BUY MORE.: NEVER TRUE

## 2023-03-06 SDOH — ECONOMIC STABILITY: HOUSING INSECURITY
IN THE LAST 12 MONTHS, WAS THERE A TIME WHEN YOU DID NOT HAVE A STEADY PLACE TO SLEEP OR SLEPT IN A SHELTER (INCLUDING NOW)?: NO

## 2023-03-06 SDOH — ECONOMIC STABILITY: FOOD INSECURITY: WITHIN THE PAST 12 MONTHS, THE FOOD YOU BOUGHT JUST DIDN'T LAST AND YOU DIDN'T HAVE MONEY TO GET MORE.: NEVER TRUE

## 2023-03-06 ASSESSMENT — PATIENT HEALTH QUESTIONNAIRE - PHQ9
SUM OF ALL RESPONSES TO PHQ9 QUESTIONS 1 & 2: 0
1. LITTLE INTEREST OR PLEASURE IN DOING THINGS: 0
SUM OF ALL RESPONSES TO PHQ QUESTIONS 1-9: 0
2. FEELING DOWN, DEPRESSED OR HOPELESS: 0
SUM OF ALL RESPONSES TO PHQ QUESTIONS 1-9: 0

## 2023-03-06 NOTE — PROGRESS NOTES
Subjective:      Patient ID: Amira Mendez is a 78 y.o. male. Chief Complaint   Patient presents with    Check-Up     Lipids - pt is fasting        Patient presents with:  Check-Up: Lipids - pt is fasting    Here for the above  He is well overall   He is on the meds    He wanted to discuss his afib and his treatment    Seconds in duration will get some intermittent dizzy and at times fuzzy vision for 2-3 years. Every 2 week  Not associated with activity   No pelaez       YOB: 1945    Date of Visit:  3/6/2023    No Known Allergies    Current Outpatient Medications:  pravastatin (PRAVACHOL) 40 MG tablet, TAKE 1 TABLET BY MOUTH EVERY EVENING, Disp: 90 tablet, Rfl: 2  metoprolol succinate (TOPROL XL) 50 MG extended release tablet, TAKE 1 TABLET BY MOUTH DAILY, Disp: 90 tablet, Rfl: 2  albuterol sulfate HFA (VENTOLIN HFA) 108 (90 Base) MCG/ACT inhaler, Inhale 2 puffs into the lungs 4 times daily as needed for Wheezing (cough) Dispense with a spacing device, Disp: 18 g, Rfl: 0  mometasone (ELOCON) 0.1 % cream, Apply topically daily. , Disp: 60 g, Rfl: 0  clotrimazole (LOTRIMIN) 1 % cream, Apply topically 2 times daily Apply topically 2 times daily. For 2 week, Disp: 45 g, Rfl: 0  aspirin 325 MG EC tablet, Take 1 tablet by mouth daily, Disp: 30 tablet, Rfl: 3  Acetaminophen 650 MG TABS, Take by mouth 2 times daily , Disp: , Rfl:   Glucosamine-Chondroitin-MSM (TRIPLE FLEX PO), Take by mouth 3 times daily , Disp: , Rfl:   Multiple Vitamins-Minerals (MADHU MULTIVITAMIN FOR MEN PO), Take  by mouth.  For memory takes 1 tablet, Disp: , Rfl:   SAW PALMETTO, Take by mouth 2 times daily , Disp: , Rfl:     No current facility-administered medications for this visit.      ---------------------------               03/06/23                      0833         ---------------------------   BP:          128/76         Site:    Left Upper Arm     Position:     Sitting        Cuff Size:  Medium Adult Pulse:         88           Temp:   97.2 °F (36.2 °C)   TempSrc:    Temporal        Weight: 176 lb (79.8 kg)    Height: 5' 5.5\" (1.664 m)  ---------------------------  Body mass index is 28.84 kg/m². Wt Readings from Last 3 Encounters:  03/06/23 : 176 lb (79.8 kg)  01/24/23 : 176 lb (79.8 kg)  12/01/22 : 177 lb (80.3 kg)    BP Readings from Last 3 Encounters:  03/06/23 : 128/76  01/24/23 : 124/80  12/01/22 : 118/74          Review of Systems    Objective:   Physical Exam  Constitutional:       General: He is not in acute distress. Appearance: Normal appearance. He is well-developed. He is not ill-appearing or diaphoretic. Neck:      Thyroid: No thyromegaly. Vascular: No carotid bruit. Cardiovascular:      Rate and Rhythm: Normal rate and regular rhythm. Heart sounds: Normal heart sounds. No murmur heard. No friction rub. No gallop. Pulmonary:      Effort: Pulmonary effort is normal. No tachypnea, accessory muscle usage or respiratory distress. Breath sounds: Normal breath sounds. No decreased breath sounds, wheezing, rhonchi or rales. Musculoskeletal:      Cervical back: Neck supple. Lymphadenopathy:      Cervical: No cervical adenopathy. Upper Body:      Right upper body: No supraclavicular adenopathy. Left upper body: No supraclavicular adenopathy. Skin:     General: Skin is warm and dry. Coloration: Skin is not pale. Neurological:      Mental Status: He is alert. Assessment:       Diagnosis Orders   1. Mixed hyperlipidemia  Comprehensive Metabolic Panel    Lipid Panel      2.  Dizzy  Comprehensive Metabolic Panel    MRI BRAIN WO CONTRAST          I answered his questions re the xarelto  We reviewed the afib and his risk now that he is older  Would wonder if his sx are not the intermittent a fib  Will image       Plan:      Do contact dr Wilber Sher for the xarelto  Continue the diet and medicines  Do the mri testing         Cathy Churchill MD

## 2023-03-10 NOTE — RESULT ENCOUNTER NOTE
Detail Level: Zone
Please notify patient that their [address] shows no clot and complete occlusion of the leaking vein.  !!
Detail Level: Detailed

## 2023-03-23 ENCOUNTER — HOSPITAL ENCOUNTER (OUTPATIENT)
Dept: MRI IMAGING | Age: 78
Discharge: HOME | End: 2023-03-23
Payer: MEDICARE

## 2023-03-23 DIAGNOSIS — R42 DIZZY: ICD-10-CM

## 2023-03-23 PROCEDURE — 70551 MRI BRAIN STEM W/O DYE: CPT

## 2023-03-28 ENCOUNTER — TELEPHONE (OUTPATIENT)
Dept: CARDIOLOGY CLINIC | Age: 78
End: 2023-03-28

## 2023-03-28 NOTE — TELEPHONE ENCOUNTER
Patient is agreeable to starting Xarelto 20 mg daily - see Dr. Frank Roche office note 12/1/22. Sent to pharmacy. He v/u.

## 2023-03-28 NOTE — TELEPHONE ENCOUNTER
Lucero Mccoy called in this afternoon, he states when he last saw Dr. Zohaib Álvarez he wanted him to start Xarelto and he has some questions about it. He's taking aspirin so he has questions about that and other medication. He can be reached at [phone].

## 2023-04-03 ENCOUNTER — TELEPHONE (OUTPATIENT)
Dept: FAMILY MEDICINE CLINIC | Age: 78
End: 2023-04-03

## 2023-04-03 NOTE — TELEPHONE ENCOUNTER
----- Message from Radhashirapool Vazquez sent at 4/1/2023 12:22 PM EDT -----  Regarding: Makeda Levels - Rx Rivaroxaban & Acetaminophen  Dr. Chacha Epperson I have started the Rivaroxaban and had stopped taking aspirin. Can I still take acetaminophen (650 mg) twice a day for arthritis? Thanks for your expertise.   Maury Horton   [phone]

## 2023-07-31 ENCOUNTER — TELEPHONE (OUTPATIENT)
Dept: FAMILY MEDICINE CLINIC | Age: 78
End: 2023-07-31

## 2023-07-31 NOTE — TELEPHONE ENCOUNTER
Patient called to see if pcp had gotten the EEG report that he had taken at [facility] on July 26th. Please let patient know.

## 2023-08-01 NOTE — TELEPHONE ENCOUNTER
Dyan Fletcher advised and states he is not happy that he can't see his EEG results in his mychart. Explained that Dr. Yael Edge who ordered this test is not a [place] so it will not show up in mychart. He would like to speak to the Manager in regards of having his records be placed in [employer] so he can view them.

## 2023-08-07 ENCOUNTER — APPOINTMENT (OUTPATIENT)
Dept: GENERAL RADIOLOGY | Age: 78
End: 2023-08-07
Payer: MEDICARE

## 2023-08-07 ENCOUNTER — HOSPITAL ENCOUNTER (EMERGENCY)
Age: 78
Discharge: HOME | End: 2023-08-07
Attending: EMERGENCY MEDICINE
Payer: MEDICARE

## 2023-08-07 VITALS
HEIGHT: 66 IN | DIASTOLIC BLOOD PRESSURE: 83 MMHG | TEMPERATURE: 97.6 F | SYSTOLIC BLOOD PRESSURE: 156 MMHG | WEIGHT: 178 LBS | HEART RATE: 69 BPM | OXYGEN SATURATION: 97 % | RESPIRATION RATE: 18 BRPM | BODY MASS INDEX: 28.61 KG/M2

## 2023-08-07 DIAGNOSIS — S20.212A CONTUSION OF LEFT CHEST WALL, INITIAL ENCOUNTER: Primary | ICD-10-CM

## 2023-08-07 PROCEDURE — 99283 EMERGENCY DEPT VISIT LOW MDM: CPT

## 2023-08-07 PROCEDURE — 71101 X-RAY EXAM UNILAT RIBS/CHEST: CPT

## 2023-08-07 PROCEDURE — 6370000000 HC RX 637 (ALT 250 FOR IP): Performed by: EMERGENCY MEDICINE

## 2023-08-07 RX ORDER — OXYCODONE HYDROCHLORIDE AND ACETAMINOPHEN 5; 325 MG/1; MG/1
1 TABLET ORAL ONCE
Status: COMPLETED | OUTPATIENT
Start: 2023-08-07 | End: 2023-08-07

## 2023-08-07 RX ORDER — HYDROCODONE BITARTRATE AND ACETAMINOPHEN 5; 325 MG/1; MG/1
1 TABLET ORAL EVERY 6 HOURS PRN
Qty: 12 TABLET | Refills: 0 | Status: SHIPPED | OUTPATIENT
Start: 2023-08-07 | End: 2023-08-10

## 2023-08-07 RX ADMIN — OXYCODONE HYDROCHLORIDE AND ACETAMINOPHEN 1 TABLET: 5; 325 TABLET ORAL at 19:08

## 2023-08-07 ASSESSMENT — PAIN - FUNCTIONAL ASSESSMENT
PAIN_FUNCTIONAL_ASSESSMENT: 0-10
PAIN_FUNCTIONAL_ASSESSMENT: PREVENTS OR INTERFERES SOME ACTIVE ACTIVITIES AND ADLS
PAIN_FUNCTIONAL_ASSESSMENT: 0-10

## 2023-08-07 ASSESSMENT — PAIN DESCRIPTION - PAIN TYPE: TYPE: ACUTE PAIN

## 2023-08-07 ASSESSMENT — PAIN SCALES - GENERAL
PAINLEVEL_OUTOF10: 3
PAINLEVEL_OUTOF10: 8
PAINLEVEL_OUTOF10: 3

## 2023-08-07 ASSESSMENT — PAIN DESCRIPTION - LOCATION: LOCATION: CHEST

## 2023-08-07 ASSESSMENT — LIFESTYLE VARIABLES
HOW OFTEN DO YOU HAVE A DRINK CONTAINING ALCOHOL: NEVER
HOW MANY STANDARD DRINKS CONTAINING ALCOHOL DO YOU HAVE ON A TYPICAL DAY: PATIENT DOES NOT DRINK
HOW OFTEN DO YOU HAVE A DRINK CONTAINING ALCOHOL: NEVER

## 2023-08-07 ASSESSMENT — PAIN DESCRIPTION - ORIENTATION: ORIENTATION: LEFT

## 2023-08-07 ASSESSMENT — PAIN DESCRIPTION - DESCRIPTORS: DESCRIPTORS: SHARP

## 2023-08-07 ASSESSMENT — PAIN DESCRIPTION - FREQUENCY: FREQUENCY: INTERMITTENT

## 2023-08-07 NOTE — ED PROVIDER NOTES
severe sepsis or septic shock? No   Exclusion criteria - the patient is NOT to be included for SEP-1 Core Measure due to: Infection is not suspected    Chronic Conditions affecting care: Below   has a past medical history of Atrial fibrillation ([address]), Kidney stone, Mixed hyperlipidemia, Near syncope, Palpitations, Prostate enlargement, and Syncope and collapse. CONSULTS: (Who and What was discussed)  None    Social Determinants : None    Records Reviewed (Source): PMH / Medications / EPIC  Controlled Substance Monitoring:    Acute and Chronic Pain Monitoring:   RX Monitoring 8/7/2023   Periodic Controlled Substance Monitoring Possible medication side effects, risk of tolerance/dependence & alternative treatments discussed. ;No signs of potential drug abuse or diversion identified. CC/HPI Summary, DDx, ED Course, and Reassessment: This patient presents with pain in his left upper anterior chest.  He was cleaning a small pond and fell against a [address] hitting his left upper chest.  He has pain mainly with movement. It is nonradiating. He is not short of breath. No hemoptysis. No abdominal pain. No head injury. No neck or back pain. Patient is afebrile at 97.6 with a heart rate of 90 and a respirate of 18. Blood pressure is 182/101. Oxygen saturation 94%. He has no signs of head or facial trauma. He has tenderness in the left upper anterior chest around the fourth and fifth ribs. No crepitance. No bruising of the chest wall. Normal cardiopulmonary exam.  Benign nontender abdomen. No extremity trauma. He has an abrasion over his right posterior shoulder. Left rib x-ray including chest showed no acute abnormality. No rib fracture. This patient has localized tenderness over his left anterior chest where he hit a [address] when he fell forward. He is not short of breath. He has no hemoptysis. He has no abdominal pain or tenderness.   Other than abrasion to his right shoulder he has no

## 2023-08-07 NOTE — ED TRIAGE NOTES
While cleaning a decorative pond he slipped and hit the left side of his chest on a large rock.   Incident occurred about an hour prior to arrival.

## 2023-08-07 NOTE — ED NOTES
Hospitalist Report given to Tanvir Alexandra RN using SBAR including pain.      Live Marsh RN  08/07/23 9539

## 2023-08-08 NOTE — ED NOTES
M[place] into see pt and discuss testing results and further plan of care w pt/family and discharge instructions w f/u.       Ema Saldaña RN  08/07/23 2036

## 2023-08-08 NOTE — DISCHARGE INSTRUCTIONS
You can take Tylenol every 6 hours as needed for pain. If not relieved you can use Vicodin 1 every 6 hours. Use ice pack or cold compress to the injured area every 2-3 hours for the next 2 days to help with pain. Follow-up in 5 to 7 days if not improved. Return for shortness of breath, coughing up blood or any other new symptoms of concern.

## 2023-08-08 NOTE — TELEPHONE ENCOUNTER
Called the patient and explained the process when seeing a specialist.  The specialist will send over the records and these are scanned in the patient's chart. The provider is able to see the notes and tests done by the specialist.  Patient is relieved to have a better understanding of the process and system.

## 2023-08-28 ENCOUNTER — TELEPHONE (OUTPATIENT)
Dept: CARDIOLOGY CLINIC | Age: 78
End: 2023-08-28

## 2023-08-28 NOTE — TELEPHONE ENCOUNTER
HE WILL BE OUT OF THE COUNTRY FOR 30 DAYS AND HE WON'T HAVE ENOUGH. HE LEAVES ON Monday.  CAN IT BE SENT BY FRIDAY  Medication Refill    Medication needing refilled:XARELTO    Dosage of the medication:20MG    How are you taking this medication (QD, BID, TID, QID, PRN):1 TABLET DAILY    30 or 90 day supply called in:90DAYS    When will you run out of your medication:FEW DAYS    Which Pharmacy are we sending the medication to?:    [address] [address]   Phone:  [phone]  Fax:  [phone]

## 2023-08-29 NOTE — TELEPHONE ENCOUNTER
Called and talked to JOSE JUAN ISBELL [facility] wife let her know the medication was called in

## 2023-08-29 NOTE — TELEPHONE ENCOUNTER
Pt is very nervous about not getting his RX before he leaves for vacation - can you please push through

## 2023-11-24 RX ORDER — METOPROLOL SUCCINATE 50 MG/1
50 TABLET, EXTENDED RELEASE ORAL DAILY
Qty: 90 TABLET | Refills: 2 | Status: SHIPPED | OUTPATIENT
Start: 2023-11-24

## 2024-01-09 NOTE — PROGRESS NOTES
[facility] [place]    Dick Gonzalez  1945 January 9, 2024    CC: \"    HPI:  The patient is 78 y.o. male with a past medical history significant for atrial fibrillation, hyperlipidemia and hypothyroidism. He was referred to Dr. Hernandez for chronic venous insufficiency s/p Varithena procedure of the left GSV.     He presents today for follow up          Review of Systems:  Constitutional: No fatigue, weakness, night sweats or fever.   HEENT: No new vision difficulties or ringing in the ears.  Respiratory: No new SOB, PND, orthopnea or cough.   Cardiovascular: See HPI   GI: No n/v, diarrhea, constipation, abdominal pain or changes in bowel habits.  No melena, no hematochezia  : No urinary frequency, urgency, incontinence, hematuria or dysuria.  Skin: No cyanosis or skin lesions.  Musculoskeletal: No new muscle or joint pain.  Neurological: No syncope or TIA-like symptoms.  Psychiatric: No anxiety, insomnia or depression     Past Medical History:   Diagnosis Date    Atrial fibrillation (HCC)     new onset    Kidney stone     Mixed hyperlipidemia     managed by PCP    Near syncope     Palpitations     Prostate enlargement     Syncope and collapse      Past Surgical History:   Procedure Laterality Date    COLONOSCOPY  9/26/2001    negativedavid, also neg sigmoid 2007    COLONOSCOPY  2/15/2011    negative dr hernandez    COLONOSCOPY  08/23/2021    dr sumner, polyps, repeat in 5 years    KIDNEY STONE SURGERY       Family History   Problem Relation Age of Onset    Rheum Arthritis Brother     Heart Disease Brother     Heart Disease Mother         older age    Dementia Father      Social History     Tobacco Use    Smoking status: Never    Smokeless tobacco: Never   Vaping Use    Vaping Use: Never used   Substance Use Topics    Alcohol use: Not Currently     Alcohol/week: 0.0 standard drinks of alcohol     Comment: minimal caffeine occasional wine    Drug use: No       No Known Allergies  Current

## 2024-01-11 ENCOUNTER — OFFICE VISIT (OUTPATIENT)
Dept: CARDIOLOGY CLINIC | Age: 79
End: 2024-01-11
Payer: MEDICARE

## 2024-01-11 VITALS
DIASTOLIC BLOOD PRESSURE: 58 MMHG | OXYGEN SATURATION: 97 % | HEART RATE: 75 BPM | WEIGHT: 177.4 LBS | HEIGHT: 66 IN | SYSTOLIC BLOOD PRESSURE: 120 MMHG | BODY MASS INDEX: 28.51 KG/M2

## 2024-01-11 DIAGNOSIS — I48.19 PERSISTENT ATRIAL FIBRILLATION (HCC): Primary | ICD-10-CM

## 2024-01-11 DIAGNOSIS — E78.5 HYPERLIPIDEMIA LDL GOAL <130: ICD-10-CM

## 2024-01-11 DIAGNOSIS — I87.2 VENOUS INSUFFICIENCY: ICD-10-CM

## 2024-01-11 DIAGNOSIS — R01.1 MURMUR: ICD-10-CM

## 2024-01-11 PROCEDURE — 1036F TOBACCO NON-USER: CPT | Performed by: INTERNAL MEDICINE

## 2024-01-11 PROCEDURE — G8427 DOCREV CUR MEDS BY ELIG CLIN: HCPCS | Performed by: INTERNAL MEDICINE

## 2024-01-11 PROCEDURE — 93000 ELECTROCARDIOGRAM COMPLETE: CPT | Performed by: INTERNAL MEDICINE

## 2024-01-11 PROCEDURE — G8484 FLU IMMUNIZE NO ADMIN: HCPCS | Performed by: INTERNAL MEDICINE

## 2024-01-11 PROCEDURE — G8417 CALC BMI ABV UP PARAM F/U: HCPCS | Performed by: INTERNAL MEDICINE

## 2024-01-11 PROCEDURE — 99214 OFFICE O/P EST MOD 30 MIN: CPT | Performed by: INTERNAL MEDICINE

## 2024-01-11 PROCEDURE — 1123F ACP DISCUSS/DSCN MKR DOCD: CPT | Performed by: INTERNAL MEDICINE

## 2024-01-11 NOTE — PROGRESS NOTES
[facility] [place]    Dick Gonzalez  1945 January 11, 2024    CC: \"feeling well\"    HPI:  The patient is 78 y.o. male with a past medical history significant for atrial fibrillation, hyperlipidemia and hypothyroidism. He was referred to Dr. Hernandez for chronic venous insufficiency s/p Varithena procedure of the left GSV.     He presents today for follow up states is doing well is retired now and states not exercising like he used too since August.  States gets up in am takes a hot shower and getting back into exercising 20 minutes a day.  States his leg is doing well since the procedure with Dr Hernandez.  He denies any new sounding cardiac complaints. He denies any chest pains or worsening shortness of breath. He reports medication compliance and is tolerating. He denies any abnormal bleeding or bruising. He denies exertional chest pain/pressure, dyspnea at rest, worsening DE SOUZA, PND, orthopnea, palpitations, lightheadedness, weight changes, changes in LE edema, and syncope.     Review of Systems:  Constitutional: No fatigue, weakness, night sweats or fever.   HEENT: No new vision difficulties or ringing in the ears.  Respiratory: No new SOB, PND, orthopnea or cough.   Cardiovascular: See HPI   GI: No n/v, diarrhea, constipation, abdominal pain or changes in bowel habits.  No melena, no hematochezia  : No urinary frequency, urgency, incontinence, hematuria or dysuria.  Skin: No cyanosis or skin lesions.  Musculoskeletal: No new muscle or joint pain.  Neurological: No syncope or TIA-like symptoms.  Psychiatric: No anxiety, insomnia or depression     Past Medical History:   Diagnosis Date    Atrial fibrillation (HCC)     new onset    Kidney stone     Mixed hyperlipidemia     managed by PCP    Near syncope     Palpitations     Prostate enlargement     Syncope and collapse      Past Surgical History:   Procedure Laterality Date    COLONOSCOPY  9/26/2001    negative, deak, also neg sigmoid 2007

## 2024-01-29 RX ORDER — PRAVASTATIN SODIUM 40 MG
TABLET ORAL
Qty: 90 TABLET | Refills: 0 | Status: SHIPPED | OUTPATIENT
Start: 2024-01-29

## 2024-02-05 ENCOUNTER — HOSPITAL ENCOUNTER (OUTPATIENT)
Dept: CARDIOLOGY | Age: 79
Discharge: HOME | End: 2024-02-05
Payer: MEDICARE

## 2024-02-05 DIAGNOSIS — I48.19 PERSISTENT ATRIAL FIBRILLATION (HCC): ICD-10-CM

## 2024-02-05 DIAGNOSIS — R01.1 MURMUR: ICD-10-CM

## 2024-02-05 PROCEDURE — 93306 TTE W/DOPPLER COMPLETE: CPT

## 2024-02-07 ENCOUNTER — TELEPHONE (OUTPATIENT)
Dept: CARDIOLOGY CLINIC | Age: 79
End: 2024-02-07

## 2024-02-07 NOTE — TELEPHONE ENCOUNTER
Dick called in this morning, returning a call concerning his echo results.      He can be reached at [phone].

## 2024-04-08 SDOH — HEALTH STABILITY: PHYSICAL HEALTH: ON AVERAGE, HOW MANY DAYS PER WEEK DO YOU ENGAGE IN MODERATE TO STRENUOUS EXERCISE (LIKE A BRISK WALK)?: 5 DAYS

## 2024-04-08 SDOH — HEALTH STABILITY: PHYSICAL HEALTH: ON AVERAGE, HOW MANY MINUTES DO YOU ENGAGE IN EXERCISE AT THIS LEVEL?: 60 MIN

## 2024-04-08 ASSESSMENT — PATIENT HEALTH QUESTIONNAIRE - PHQ9
SUM OF ALL RESPONSES TO PHQ9 QUESTIONS 1 & 2: 0
1. LITTLE INTEREST OR PLEASURE IN DOING THINGS: NOT AT ALL
1. LITTLE INTEREST OR PLEASURE IN DOING THINGS: NOT AT ALL
SUM OF ALL RESPONSES TO PHQ9 QUESTIONS 1 & 2: 0
SUM OF ALL RESPONSES TO PHQ9 QUESTIONS 1 & 2: 0
SUM OF ALL RESPONSES TO PHQ QUESTIONS 1-9: 0
2. FEELING DOWN, DEPRESSED OR HOPELESS: NOT AT ALL
1. LITTLE INTEREST OR PLEASURE IN DOING THINGS: NOT AT ALL
SUM OF ALL RESPONSES TO PHQ QUESTIONS 1-9: 0

## 2024-04-08 ASSESSMENT — LIFESTYLE VARIABLES
HOW MANY STANDARD DRINKS CONTAINING ALCOHOL DO YOU HAVE ON A TYPICAL DAY: PATIENT DOES NOT DRINK
HOW MANY STANDARD DRINKS CONTAINING ALCOHOL DO YOU HAVE ON A TYPICAL DAY: 0
HOW OFTEN DO YOU HAVE A DRINK CONTAINING ALCOHOL: NEVER
HOW OFTEN DO YOU HAVE A DRINK CONTAINING ALCOHOL: 1
HOW OFTEN DO YOU HAVE SIX OR MORE DRINKS ON ONE OCCASION: 1

## 2024-04-12 ENCOUNTER — OFFICE VISIT (OUTPATIENT)
Dept: FAMILY MEDICINE CLINIC | Age: 79
End: 2024-04-12
Payer: MEDICARE

## 2024-04-12 VITALS
SYSTOLIC BLOOD PRESSURE: 118 MMHG | TEMPERATURE: 97 F | WEIGHT: 179.2 LBS | HEIGHT: 65 IN | HEART RATE: 92 BPM | DIASTOLIC BLOOD PRESSURE: 80 MMHG | BODY MASS INDEX: 29.85 KG/M2

## 2024-04-12 DIAGNOSIS — E78.2 MIXED HYPERLIPIDEMIA: Primary | ICD-10-CM

## 2024-04-12 DIAGNOSIS — Z79.01 CHRONIC ANTICOAGULATION: ICD-10-CM

## 2024-04-12 DIAGNOSIS — Z12.5 PROSTATE CANCER SCREENING: ICD-10-CM

## 2024-04-12 DIAGNOSIS — E78.2 MIXED HYPERLIPIDEMIA: ICD-10-CM

## 2024-04-12 DIAGNOSIS — I48.91 ATRIAL FIBRILLATION, UNSPECIFIED TYPE (HCC): ICD-10-CM

## 2024-04-12 DIAGNOSIS — Z00.00 MEDICARE ANNUAL WELLNESS VISIT, SUBSEQUENT: Primary | ICD-10-CM

## 2024-04-12 LAB
ALBUMIN SERPL-MCNC: 4.7 G/DL (ref 3.4–5)
ALBUMIN/GLOB SERPL: 2.2 {RATIO} (ref 1.1–2.2)
ALP SERPL-CCNC: 70 U/L (ref 40–129)
ALT SERPL-CCNC: 29 U/L (ref 10–40)
ANION GAP SERPL CALCULATED.3IONS-SCNC: 13 MMOL/L (ref 3–16)
AST SERPL-CCNC: 30 U/L (ref 15–37)
BACTERIA URNS QL MICRO: ABNORMAL /HPF
BASOPHILS # BLD: 0.1 K/UL (ref 0–0.2)
BASOPHILS NFR BLD: 1.1 %
BILIRUB SERPL-MCNC: 0.4 MG/DL (ref 0–1)
BILIRUB UR QL STRIP.AUTO: NEGATIVE
BUN SERPL-MCNC: 12 MG/DL (ref 7–20)
CALCIUM SERPL-MCNC: 9.4 MG/DL (ref 8.3–10.6)
CHLORIDE SERPL-SCNC: 108 MMOL/L (ref 99–110)
CHOLEST SERPL-MCNC: 169 MG/DL (ref 0–199)
CLARITY UR: CLEAR
CO2 SERPL-SCNC: 23 MMOL/L (ref 21–32)
COLOR UR: ABNORMAL
CREAT SERPL-MCNC: 1 MG/DL (ref 0.8–1.3)
DEPRECATED RDW RBC AUTO: 13.4 % (ref 12.4–15.4)
EOSINOPHIL # BLD: 0.2 K/UL (ref 0–0.6)
EOSINOPHIL NFR BLD: 5.4 %
EPI CELLS #/AREA URNS AUTO: 1 /HPF (ref 0–5)
FOLATE SERPL-MCNC: >20 NG/ML (ref 4.78–24.2)
GFR SERPLBLD CREATININE-BSD FMLA CKD-EPI: 78 ML/MIN/{1.73_M2}
GLUCOSE SERPL-MCNC: 94 MG/DL (ref 70–99)
GLUCOSE UR STRIP.AUTO-MCNC: NEGATIVE MG/DL
HCT VFR BLD AUTO: 43.6 % (ref 40.5–52.5)
HDLC SERPL-MCNC: 54 MG/DL (ref 40–60)
HGB BLD-MCNC: 14.8 G/DL (ref 13.5–17.5)
HGB UR QL STRIP.AUTO: NEGATIVE
HYALINE CASTS #/AREA URNS AUTO: 1 /LPF (ref 0–8)
KETONES UR STRIP.AUTO-MCNC: NEGATIVE MG/DL
LDLC SERPL CALC-MCNC: 95 MG/DL
LEUKOCYTE ESTERASE UR QL STRIP.AUTO: NEGATIVE
LYMPHOCYTES # BLD: 1.5 K/UL (ref 1–5.1)
LYMPHOCYTES NFR BLD: 33.9 %
MCH RBC QN AUTO: 31.4 PG (ref 26–34)
MCHC RBC AUTO-ENTMCNC: 33.9 G/DL (ref 31–36)
MCV RBC AUTO: 92.6 FL (ref 80–100)
MONOCYTES # BLD: 0.7 K/UL (ref 0–1.3)
MONOCYTES NFR BLD: 15.4 %
NEUTROPHILS # BLD: 2 K/UL (ref 1.7–7.7)
NEUTROPHILS NFR BLD: 44.2 %
NITRITE UR QL STRIP.AUTO: NEGATIVE
PH UR STRIP.AUTO: 5.5 [PH] (ref 5–8)
PLATELET # BLD AUTO: 188 K/UL (ref 135–450)
PMV BLD AUTO: 8.3 FL (ref 5–10.5)
POTASSIUM SERPL-SCNC: 4.8 MMOL/L (ref 3.5–5.1)
PROT SERPL-MCNC: 6.8 G/DL (ref 6.4–8.2)
PROT UR STRIP.AUTO-MCNC: ABNORMAL MG/DL
PSA SERPL DL<=0.01 NG/ML-MCNC: 5.68 NG/ML (ref 0–4)
RBC # BLD AUTO: 4.7 M/UL (ref 4.2–5.9)
RBC CLUMPS #/AREA URNS AUTO: 3 /HPF (ref 0–4)
SODIUM SERPL-SCNC: 144 MMOL/L (ref 136–145)
SP GR UR STRIP.AUTO: 1.03 (ref 1–1.03)
TRIGL SERPL-MCNC: 99 MG/DL (ref 0–150)
TSH SERPL DL<=0.005 MIU/L-ACNC: 2.63 UIU/ML (ref 0.27–4.2)
UA DIPSTICK W REFLEX MICRO PNL UR: YES
URN SPEC COLLECT METH UR: ABNORMAL
UROBILINOGEN UR STRIP-ACNC: 0.2 E.U./DL
VIT B12 SERPL-MCNC: 1471 PG/ML (ref 211–911)
VLDLC SERPL CALC-MCNC: 20 MG/DL
WBC # BLD AUTO: 4.5 K/UL (ref 4–11)
WBC #/AREA URNS AUTO: 1 /HPF (ref 0–5)

## 2024-04-12 PROCEDURE — 1036F TOBACCO NON-USER: CPT | Performed by: FAMILY MEDICINE

## 2024-04-12 PROCEDURE — G0439 PPPS, SUBSEQ VISIT: HCPCS | Performed by: FAMILY MEDICINE

## 2024-04-12 PROCEDURE — G8427 DOCREV CUR MEDS BY ELIG CLIN: HCPCS | Performed by: FAMILY MEDICINE

## 2024-04-12 PROCEDURE — G8417 CALC BMI ABV UP PARAM F/U: HCPCS | Performed by: FAMILY MEDICINE

## 2024-04-12 PROCEDURE — 1123F ACP DISCUSS/DSCN MKR DOCD: CPT | Performed by: FAMILY MEDICINE

## 2024-04-12 PROCEDURE — 99214 OFFICE O/P EST MOD 30 MIN: CPT | Performed by: FAMILY MEDICINE

## 2024-04-12 RX ORDER — SILDENAFIL CITRATE 20 MG/1
20 TABLET ORAL PRN
COMMUNITY
Start: 2023-04-25

## 2024-04-12 SDOH — ECONOMIC STABILITY: FOOD INSECURITY: WITHIN THE PAST 12 MONTHS, YOU WORRIED THAT YOUR FOOD WOULD RUN OUT BEFORE YOU GOT MONEY TO BUY MORE.: NEVER TRUE

## 2024-04-12 SDOH — ECONOMIC STABILITY: FOOD INSECURITY: WITHIN THE PAST 12 MONTHS, THE FOOD YOU BOUGHT JUST DIDN'T LAST AND YOU DIDN'T HAVE MONEY TO GET MORE.: NEVER TRUE

## 2024-04-12 SDOH — ECONOMIC STABILITY: INCOME INSECURITY: HOW HARD IS IT FOR YOU TO PAY FOR THE VERY BASICS LIKE FOOD, HOUSING, MEDICAL CARE, AND HEATING?: NOT HARD AT ALL

## 2024-04-12 ASSESSMENT — ENCOUNTER SYMPTOMS
ROS SKIN COMMENTS: NO LESIONS
CONSTIPATION: 0
BLOOD IN STOOL: 0
VOMITING: 0
CHEST TIGHTNESS: 0
SHORTNESS OF BREATH: 0
ABDOMINAL PAIN: 0
DIARRHEA: 0
ABDOMINAL DISTENTION: 0

## 2024-04-12 NOTE — PROGRESS NOTES
Subjective:      Patient ID: Dick Gonzalez is a 78 y.o. male.    Chief Complaint   Patient presents with    Check-Up     Lipids - pt is fasting        Patient presents with:  Check-Up: Lipids - pt is fasting    Here for the above   He is well   No c/o  Meds same     YOB: 1945    Date of Visit:  4/12/2024    No Known Allergies    Current Outpatient Medications:  sildenafil (REVATIO) 20 MG tablet, Take 1 tablet by mouth as needed, Disp: , Rfl:   pravastatin (PRAVACHOL) 40 MG tablet, TAKE 1 TABLET BY MOUTH EVERY EVENING, Disp: 90 tablet, Rfl: 0  metoprolol succinate (TOPROL XL) 50 MG extended release tablet, TAKE 1 TABLET BY MOUTH DAILY, Disp: 90 tablet, Rfl: 2  rivaroxaban (XARELTO) 20 MG TABS tablet, Take 1 tablet by mouth daily (with breakfast), Disp: 90 tablet, Rfl: 3  mometasone (ELOCON) 0.1 % cream, Apply topically daily., Disp: 60 g, Rfl: 0  clotrimazole (LOTRIMIN) 1 % cream, Apply topically 2 times daily Apply topically 2 times daily. For 2 week, Disp: 45 g, Rfl: 0  Acetaminophen 650 MG TABS, Take by mouth 2 times daily , Disp: , Rfl:   Glucosamine-Chondroitin-MSM (TRIPLE FLEX PO), Take by mouth 3 times daily , Disp: , Rfl:   Multiple Vitamins-Minerals (MADHU MULTIVITAMIN FOR MEN PO), Take  by mouth. For memory takes 1 tablet, Disp: , Rfl:   SAW PALMETTO, Take by mouth 2 times daily , Disp: , Rfl:   albuterol sulfate HFA (VENTOLIN HFA) 108 (90 Base) MCG/ACT inhaler, Inhale 2 puffs into the lungs 4 times daily as needed for Wheezing (cough) Dispense with a spacing device (Patient not taking: Reported on 4/12/2024), Disp: 18 g, Rfl: 0    No current facility-administered medications for this visit.      ---------------------------------                  04/12/24                            1009            ---------------------------------   BP:             118/80            Site:       Left Upper Arm        Position:        Sitting           Cuff Size:     Medium Adult

## 2024-04-12 NOTE — PROGRESS NOTES
MD Sebas   Multiple Vitamins-Minerals (MADHU MULTIVITAMIN FOR MEN PO) Take  by mouth. For memory takes 1 tablet Yes ProviderSebas MD   SAW PALMELADIAO Take by mouth 2 times daily  Yes Provider, MD Sebas   albuterol sulfate HFA (VENTOLIN HFA) 108 (90 Base) MCG/ACT inhaler Inhale 2 puffs into the lungs 4 times daily as needed for Wheezing (cough) Dispense with a spacing device  Patient not taking: Reported on 4/12/2024  Azael Marie MD       [facility] (Including outside providers/suppliers regularly involved in providing care):   Patient Care Team:  Azael Marie MD as PCP - General (Family Medicine)  Azael Marie MD as PCP - Empaneled Provider     Reviewed and updated this visit:  Keturah Calvillo LPN, 4/12/2024, performed the documented evaluation under the direct supervision of the attending physician.    This encounter was performed under Azael ayers MD’s, direct supervision, 4/12/2024.

## 2024-05-03 RX ORDER — PRAVASTATIN SODIUM 40 MG
TABLET ORAL
Qty: 90 TABLET | Refills: 2 | Status: SHIPPED | OUTPATIENT
Start: 2024-05-03

## 2024-05-31 ENCOUNTER — OFFICE VISIT (OUTPATIENT)
Dept: FAMILY MEDICINE CLINIC | Age: 79
End: 2024-05-31
Payer: MEDICARE

## 2024-05-31 VITALS
HEIGHT: 65 IN | BODY MASS INDEX: 28.86 KG/M2 | SYSTOLIC BLOOD PRESSURE: 124 MMHG | WEIGHT: 173.2 LBS | OXYGEN SATURATION: 97 % | HEART RATE: 87 BPM | TEMPERATURE: 97.3 F | DIASTOLIC BLOOD PRESSURE: 70 MMHG

## 2024-05-31 DIAGNOSIS — B35.3 ATHLETE'S FOOT ON LEFT: ICD-10-CM

## 2024-05-31 DIAGNOSIS — B37.0 THRUSH, ORAL: Primary | ICD-10-CM

## 2024-05-31 PROCEDURE — 1036F TOBACCO NON-USER: CPT | Performed by: NURSE PRACTITIONER

## 2024-05-31 PROCEDURE — G8427 DOCREV CUR MEDS BY ELIG CLIN: HCPCS | Performed by: NURSE PRACTITIONER

## 2024-05-31 PROCEDURE — 1123F ACP DISCUSS/DSCN MKR DOCD: CPT | Performed by: NURSE PRACTITIONER

## 2024-05-31 PROCEDURE — 99213 OFFICE O/P EST LOW 20 MIN: CPT | Performed by: NURSE PRACTITIONER

## 2024-05-31 PROCEDURE — G8417 CALC BMI ABV UP PARAM F/U: HCPCS | Performed by: NURSE PRACTITIONER

## 2024-05-31 ASSESSMENT — ENCOUNTER SYMPTOMS
SHORTNESS OF BREATH: 0
ABDOMINAL PAIN: 0
SORE THROAT: 0
TROUBLE SWALLOWING: 0
COUGH: 0

## 2024-05-31 NOTE — PROGRESS NOTES
Dick Gonzalez (:  1945) is a 78 y.o. male,Established patient, here for evaluation of the following chief complaint(s):  Thrush (Pt is having severe mouth dryness. Has to get up to drink at night. Pt puts a lot of effort into having good teeth and gums. Wonders if he has thrush. Has a foot fungus currently and wondering if it could have traveled to his mouth  )      Assessment & Plan   ASSESSMENT/PLAN:  1. Thrush, oral  New. Use magic mouth wash, follow up with dentist if no improvement.     2. 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 Chronic, declines need for treatment or referral today.       Return if symptoms worsen or fail to improve.         Subjective   SUBJECTIVE/OBJECTIVE:  HPI  Presents today for dry mouth 2-3 months. Denies new medication. States his tongue has a white film. Fungal infection to left foot for years. Seen podiatry in the past, left great toe nail fungus and athletes foot rash to foot arch and sole. Pt questioned if the fungus could of traveled to his mouth. Pt reports good oral hygiene, see's dentist every 6 months.   Current Outpatient Medications   Medication Sig Dispense Refill    Magic Mouthwash (MIRACLE MOUTHWASH) Swish and spit 5 mLs 4 times daily as needed for Irritation 50 mL viscous lidocaine 2%, 50 mL Mylanta, 50 mL diphenhydramine at 12.5 mg per 5 ml elixir, 50 mL nystatin (100,000U per 5 mL) suspension, 50 mL prednisolone at 15mg per 5ml solution, 50 mL distilled water 300 mL 0    pravastatin (PRAVACHOL) 40 MG tablet TAKE 1 TABLET BY MOUTH EVERY EVENING 90 tablet 2    sildenafil (REVATIO) 20 MG tablet Take 1 tablet by mouth as needed      metoprolol succinate (TOPROL XL) 50 MG extended release tablet TAKE 1 TABLET BY MOUTH DAILY 90 tablet 2    rivaroxaban (XARELTO) 20 MG TABS tablet Take 1 tablet by mouth daily (with breakfast) 90 tablet 3    mometasone (ELOCON) 0.1 % cream Apply topically daily. 60 g 0    clotrimazole (LOTRIMIN) 1 % cream Apply topically 2 times daily Apply

## 2024-06-12 ENCOUNTER — OFFICE VISIT (OUTPATIENT)
Dept: FAMILY MEDICINE CLINIC | Age: 79
End: 2024-06-12
Payer: MEDICARE

## 2024-06-12 VITALS
WEIGHT: 176 LBS | BODY MASS INDEX: 29.32 KG/M2 | OXYGEN SATURATION: 98 % | HEIGHT: 65 IN | DIASTOLIC BLOOD PRESSURE: 74 MMHG | HEART RATE: 67 BPM | SYSTOLIC BLOOD PRESSURE: 118 MMHG | TEMPERATURE: 97.7 F

## 2024-06-12 DIAGNOSIS — K13.70 MOUTH PROBLEM: Primary | ICD-10-CM

## 2024-06-12 PROCEDURE — 1036F TOBACCO NON-USER: CPT | Performed by: INTERNAL MEDICINE

## 2024-06-12 PROCEDURE — G8417 CALC BMI ABV UP PARAM F/U: HCPCS | Performed by: INTERNAL MEDICINE

## 2024-06-12 PROCEDURE — 99212 OFFICE O/P EST SF 10 MIN: CPT | Performed by: INTERNAL MEDICINE

## 2024-06-12 PROCEDURE — G8427 DOCREV CUR MEDS BY ELIG CLIN: HCPCS | Performed by: INTERNAL MEDICINE

## 2024-06-12 PROCEDURE — 1123F ACP DISCUSS/DSCN MKR DOCD: CPT | Performed by: INTERNAL MEDICINE

## 2024-06-12 ASSESSMENT — ENCOUNTER SYMPTOMS
SHORTNESS OF BREATH: 0
COUGH: 0

## 2024-06-12 NOTE — PROGRESS NOTES
level: Not on file   Occupational History    Occupation: retired   Tobacco Use    Smoking status: Never    Smokeless tobacco: Never   Vaping Use    Vaping Use: Never used   Substance and Sexual Activity    Alcohol use: Not Currently     Comment: minimal caffeine occasional wine    Drug use: No    Sexual activity: Yes     Partners: Female   Other Topics Concern    Not on file   Social History Narrative    Not on file     Social Determinants of Health     Financial Resource Strain: Low Risk  (4/12/2024)    Overall Financial Resource Strain (CARDIA)     Difficulty of Paying Living Expenses: Not hard at all   Food Insecurity: No Food Insecurity (4/12/2024)    Hunger Vital Sign     Worried About Running Out of Food in the Last Year: Never true     Ran Out of Food in the Last Year: Never true   Transportation Needs: Unknown (4/12/2024)    PRAPARE - Transportation     Lack of Transportation (Medical): Not on file     Lack of Transportation (Non-Medical): No   Physical Activity: Sufficiently Active (4/8/2024)    Exercise Vital Sign     Days of Exercise per Week: 5 days     Minutes of Exercise per Session: 60 min   Stress: Not on file   Social Connections: Not on file   Intimate Partner Violence: Not on file   Housing Stability: Unknown (4/12/2024)    Housing Stability Vital Sign     Unable to Pay for Housing in the Last Year: Not on file     Number of Places Lived in the Last Year: Not on file     Unstable Housing in the Last Year: No      Past Medical History:   Diagnosis Date    Atrial fibrillation (HCC)     new onset    Kidney stone     Mixed hyperlipidemia     managed by PCP    Near syncope     Palpitations     Prostate enlargement     Syncope and collapse      Past Surgical History:   Procedure Laterality Date    COLONOSCOPY  9/26/2001    negativedavid, also neg sigmoid 2007    COLONOSCOPY  2/15/2011    negative dr hernandez    COLONOSCOPY  08/23/2021    dr sumner, polyps, repeat in 5 years    KIDNEY STONE SURGERY

## 2024-06-27 ENCOUNTER — TELEPHONE (OUTPATIENT)
Dept: CARDIOLOGY CLINIC | Age: 79
End: 2024-06-27

## 2024-06-27 NOTE — TELEPHONE ENCOUNTER
Dr Mahin Nugent is requesting advisement on Dick Gonzalez to undergo prostate biopsy procedure. They have requested to hold Xarelto 3 days prior to procedure.    Patient last seen in office 1/11/24 for management of atrial fibrillation, hyperlipidemia and hypothyroidism. Varithena procedure of the left GSV.    He is to continue on Xarelto for stoke risk reduction and has a MARIANELA VAS score of 2.     Please advise.

## 2024-06-27 NOTE — TELEPHONE ENCOUNTER
CARDIAC CLEARANCE     What type of procedure are you having? Prostate Biopsy     Which physician is performing your procedure? Dr. Mahin Nugent    When is your procedure scheduled for? 7/3    Where are you having this procedure? Urology center     Are you taking Blood Thinners?   If so what? (Name/dose/frequesncy) Xarelto     Does the surgeon want you to stop your blood thinner?  If so for how long? 3 days    Phone Number and Contact Name for Physicians office: [phone]    Fax number to send information:  [phone]

## 2024-07-05 ENCOUNTER — OFFICE VISIT (OUTPATIENT)
Dept: FAMILY MEDICINE CLINIC | Age: 79
End: 2024-07-05
Payer: MEDICARE

## 2024-07-05 VITALS
TEMPERATURE: 97.8 F | DIASTOLIC BLOOD PRESSURE: 70 MMHG | SYSTOLIC BLOOD PRESSURE: 124 MMHG | WEIGHT: 173.8 LBS | BODY MASS INDEX: 28.96 KG/M2 | HEIGHT: 65 IN

## 2024-07-05 DIAGNOSIS — H66.001 NON-RECURRENT ACUTE SUPPURATIVE OTITIS MEDIA OF RIGHT EAR WITHOUT SPONTANEOUS RUPTURE OF TYMPANIC MEMBRANE: Primary | ICD-10-CM

## 2024-07-05 PROCEDURE — G8427 DOCREV CUR MEDS BY ELIG CLIN: HCPCS

## 2024-07-05 PROCEDURE — 1123F ACP DISCUSS/DSCN MKR DOCD: CPT

## 2024-07-05 PROCEDURE — G8417 CALC BMI ABV UP PARAM F/U: HCPCS

## 2024-07-05 PROCEDURE — 99213 OFFICE O/P EST LOW 20 MIN: CPT

## 2024-07-05 PROCEDURE — 1036F TOBACCO NON-USER: CPT

## 2024-07-05 RX ORDER — AMOXICILLIN 500 MG/1
500 CAPSULE ORAL 2 TIMES DAILY
Qty: 14 CAPSULE | Refills: 0 | Status: SHIPPED | OUTPATIENT
Start: 2024-07-05 | End: 2024-07-12

## 2024-07-05 ASSESSMENT — ENCOUNTER SYMPTOMS
NAUSEA: 0
CONSTIPATION: 0
DIARRHEA: 0
BLOOD IN STOOL: 0
WHEEZING: 0
COLOR CHANGE: 0
BACK PAIN: 0
COUGH: 0
VOMITING: 0
SHORTNESS OF BREATH: 0
SINUS PRESSURE: 0
ABDOMINAL PAIN: 0
APNEA: 0
SORE THROAT: 0
TROUBLE SWALLOWING: 0

## 2024-07-05 ASSESSMENT — PATIENT HEALTH QUESTIONNAIRE - PHQ9
2. FEELING DOWN, DEPRESSED OR HOPELESS: NOT AT ALL
SUM OF ALL RESPONSES TO PHQ QUESTIONS 1-9: 0
SUM OF ALL RESPONSES TO PHQ9 QUESTIONS 1 & 2: 0
SUM OF ALL RESPONSES TO PHQ QUESTIONS 1-9: 0
SUM OF ALL RESPONSES TO PHQ QUESTIONS 1-9: 0
1. LITTLE INTEREST OR PLEASURE IN DOING THINGS: NOT AT ALL
SUM OF ALL RESPONSES TO PHQ QUESTIONS 1-9: 0

## 2024-07-05 NOTE — PATIENT INSTRUCTIONS
Thank you for choosing [facility].    Please bring a current list of medications to every appointment.    Please contact your pharmacy for any prescription refill(s) that you are requesting.

## 2024-07-05 NOTE — PROGRESS NOTES
and Rhythm: Normal rate and regular rhythm.   Pulmonary:      Effort: Pulmonary effort is normal.      Breath sounds: Normal breath sounds. No wheezing.   Abdominal:      General: Abdomen is flat. Bowel sounds are normal.   Musculoskeletal:         General: No swelling. Normal range of motion.      Cervical back: Normal range of motion.   Skin:     General: Skin is warm.      Coloration: Skin is not jaundiced.   Neurological:      General: No focal deficit present.      Mental Status: He is alert and oriented to person, place, and time.   Psychiatric:         Mood and Affect: Mood normal.         Behavior: Behavior is cooperative.         Thought Content: Thought content normal.         Judgment: Judgment normal.                 An electronic signature was used to authenticate this note.    --ARPITA Magallon NP

## 2024-08-05 ENCOUNTER — OFFICE VISIT (OUTPATIENT)
Dept: FAMILY MEDICINE CLINIC | Age: 79
End: 2024-08-05
Payer: MEDICARE

## 2024-08-05 VITALS
DIASTOLIC BLOOD PRESSURE: 68 MMHG | WEIGHT: 176 LBS | OXYGEN SATURATION: 98 % | HEIGHT: 65 IN | HEART RATE: 60 BPM | SYSTOLIC BLOOD PRESSURE: 114 MMHG | BODY MASS INDEX: 29.32 KG/M2 | TEMPERATURE: 97.3 F

## 2024-08-05 DIAGNOSIS — T63.481A ALLERGIC REACTION TO INSECT STING, ACCIDENTAL OR UNINTENTIONAL, INITIAL ENCOUNTER: Primary | ICD-10-CM

## 2024-08-05 PROCEDURE — 1123F ACP DISCUSS/DSCN MKR DOCD: CPT | Performed by: NURSE PRACTITIONER

## 2024-08-05 PROCEDURE — G8417 CALC BMI ABV UP PARAM F/U: HCPCS | Performed by: NURSE PRACTITIONER

## 2024-08-05 PROCEDURE — 1036F TOBACCO NON-USER: CPT | Performed by: NURSE PRACTITIONER

## 2024-08-05 PROCEDURE — G8427 DOCREV CUR MEDS BY ELIG CLIN: HCPCS | Performed by: NURSE PRACTITIONER

## 2024-08-05 PROCEDURE — 99213 OFFICE O/P EST LOW 20 MIN: CPT | Performed by: NURSE PRACTITIONER

## 2024-08-05 PROCEDURE — 96372 THER/PROPH/DIAG INJ SC/IM: CPT | Performed by: NURSE PRACTITIONER

## 2024-08-05 RX ORDER — METHYLPREDNISOLONE ACETATE 40 MG/ML
40 INJECTION, SUSPENSION INTRA-ARTICULAR; INTRALESIONAL; INTRAMUSCULAR; SOFT TISSUE ONCE
Status: COMPLETED | OUTPATIENT
Start: 2024-08-05 | End: 2024-08-05

## 2024-08-05 RX ORDER — PREDNISONE 20 MG/1
TABLET ORAL
Qty: 18 TABLET | Refills: 0 | Status: SHIPPED | OUTPATIENT
Start: 2024-08-05

## 2024-08-05 RX ORDER — FAMOTIDINE 20 MG/1
20 TABLET, FILM COATED ORAL 2 TIMES DAILY
Qty: 20 TABLET | Refills: 0 | Status: SHIPPED | OUTPATIENT
Start: 2024-08-05 | End: 2024-08-15

## 2024-08-05 RX ADMIN — METHYLPREDNISOLONE ACETATE 40 MG: 40 INJECTION, SUSPENSION INTRA-ARTICULAR; INTRALESIONAL; INTRAMUSCULAR; SOFT TISSUE at 10:51

## 2024-08-05 ASSESSMENT — ENCOUNTER SYMPTOMS
TROUBLE SWALLOWING: 0
WHEEZING: 0
SORE THROAT: 0
CHEST TIGHTNESS: 0
SHORTNESS OF BREATH: 0
COUGH: 0

## 2024-08-05 NOTE — PROGRESS NOTES
clotrimazole (LOTRIMIN) 1 % cream Apply topically 2 times daily Apply topically 2 times daily. For 2 week 45 g 0    Acetaminophen 650 MG TABS Take by mouth 2 times daily       Glucosamine-Chondroitin-MSM (TRIPLE FLEX PO) Take by mouth 3 times daily       Multiple Vitamins-Minerals (MADHU MULTIVITAMIN FOR MEN PO) Take  by mouth. For memory takes 1 tablet      SAW PALMETTO Take by mouth 2 times daily        Current Facility-Administered Medications   Medication Dose Route Frequency Provider Last Rate Last Admin    methylPREDNISolone acetate (DEPO-MEDROL) injection 40 mg  40 mg IntraMUSCular Once Mely Bingham APRN - NP            Past Medical History:   Diagnosis Date    Atrial fibrillation (HCC)     new onset    Kidney stone     Mixed hyperlipidemia     managed by PCP    Near syncope     Palpitations     Prostate enlargement     Syncope and collapse         Review of Systems   Constitutional:  Negative for fever.   HENT:  Negative for sore throat and trouble swallowing.    Respiratory:  Negative for cough, chest tightness, shortness of breath and wheezing.    Musculoskeletal:  Positive for joint swelling.   Skin:         Hand swelling, stings to legs and arms          Objective   Physical Exam  Constitutional:       General: He is not in acute distress.  HENT:      Head: Normocephalic and atraumatic.      Mouth/Throat:      Mouth: Mucous membranes are moist.      Pharynx: Oropharynx is clear.   Eyes:      Extraocular Movements: Extraocular movements intact.      Conjunctiva/sclera: Conjunctivae normal.      Pupils: Pupils are equal, round, and reactive to light.   Cardiovascular:      Rate and Rhythm: Normal rate and regular rhythm.   Pulmonary:      Effort: Pulmonary effort is normal.      Breath sounds: Normal breath sounds. No wheezing.   Musculoskeletal:         General: Swelling (idania hands/FA) present.   Neurological:      Mental Status: He is alert.              --ARPITA Pena - THOM

## 2024-08-26 RX ORDER — METOPROLOL SUCCINATE 50 MG/1
50 TABLET, EXTENDED RELEASE ORAL DAILY
Qty: 90 TABLET | Refills: 2 | Status: SHIPPED | OUTPATIENT
Start: 2024-08-26

## 2024-09-08 ENCOUNTER — HOSPITAL ENCOUNTER (OUTPATIENT)
Dept: GENERAL RADIOLOGY | Age: 79
Discharge: HOME | End: 2024-09-08
Payer: MEDICARE

## 2024-09-08 ENCOUNTER — HOSPITAL ENCOUNTER (OUTPATIENT)
Age: 79
Discharge: HOME | End: 2024-09-08
Payer: MEDICARE

## 2024-09-08 ENCOUNTER — OFFICE VISIT (OUTPATIENT)
Age: 79
End: 2024-09-08

## 2024-09-08 VITALS
DIASTOLIC BLOOD PRESSURE: 83 MMHG | HEART RATE: 84 BPM | SYSTOLIC BLOOD PRESSURE: 137 MMHG | OXYGEN SATURATION: 94 % | TEMPERATURE: 98.3 F | RESPIRATION RATE: 16 BRPM

## 2024-09-08 DIAGNOSIS — S20.211A CONTUSION OF RIB ON RIGHT SIDE, INITIAL ENCOUNTER: ICD-10-CM

## 2024-09-08 DIAGNOSIS — S20.211A CONTUSION OF RIB ON RIGHT SIDE, INITIAL ENCOUNTER: Primary | ICD-10-CM

## 2024-09-08 PROCEDURE — 71101 X-RAY EXAM UNILAT RIBS/CHEST: CPT

## 2024-09-08 RX ORDER — METHYLPREDNISOLONE 4 MG
TABLET, DOSE PACK ORAL
Qty: 1 KIT | Refills: 0 | Status: SHIPPED | OUTPATIENT
Start: 2024-09-08

## 2024-09-18 ENCOUNTER — TELEPHONE (OUTPATIENT)
Dept: CARDIOLOGY CLINIC | Age: 79
End: 2024-09-18

## 2024-09-23 RX ORDER — RIVAROXABAN 20 MG/1
20 TABLET, FILM COATED ORAL DAILY
Qty: 90 TABLET | Refills: 3 | Status: SHIPPED | OUTPATIENT
Start: 2024-09-23

## 2024-10-22 ENCOUNTER — OFFICE VISIT (OUTPATIENT)
Dept: FAMILY MEDICINE CLINIC | Age: 79
End: 2024-10-22

## 2024-10-22 VITALS
DIASTOLIC BLOOD PRESSURE: 80 MMHG | HEIGHT: 65 IN | SYSTOLIC BLOOD PRESSURE: 128 MMHG | TEMPERATURE: 97.5 F | BODY MASS INDEX: 29.72 KG/M2 | HEART RATE: 80 BPM | WEIGHT: 178.4 LBS

## 2024-10-22 DIAGNOSIS — E78.2 MIXED HYPERLIPIDEMIA: Primary | ICD-10-CM

## 2024-10-22 DIAGNOSIS — I48.91 ATRIAL FIBRILLATION, UNSPECIFIED TYPE (HCC): ICD-10-CM

## 2024-10-22 DIAGNOSIS — R26.81 UNSTEADY GAIT: ICD-10-CM

## 2024-10-22 ASSESSMENT — ENCOUNTER SYMPTOMS
ABDOMINAL PAIN: 0
NAUSEA: 0
BLOOD IN STOOL: 0
CHEST TIGHTNESS: 0
VOMITING: 0
SHORTNESS OF BREATH: 0
ABDOMINAL DISTENTION: 0
DIARRHEA: 0
CONSTIPATION: 0

## 2024-10-22 NOTE — PATIENT INSTRUCTIONS
Continue the careful diet   Stay with the medicines  See Chidi Steve for the walking and gait   See in about 6 months

## 2024-10-22 NOTE — PROGRESS NOTES
back: Neck supple.   Lymphadenopathy:      Cervical: No cervical adenopathy.      Upper Body:      Right upper body: No supraclavicular adenopathy.      Left upper body: No supraclavicular adenopathy.   Skin:     General: Skin is warm and dry.      Coloration: Skin is not pale.      Nails: There is no clubbing.   Neurological:      Mental Status: He is alert.         Assessment:   Assessment     Diagnosis Orders   1. Mixed hyperlipidemia        2. Atrial fibrillation, unspecified type (HCC)        3. Unsteady gait            Utd colon utd psa--prostate ca noted on w/u   No tobacco hx   Reviewed the meds with him  No change at this time   Interval Hx:  Dr gutierrez urology for the prostate ca on 9/9/24 and going to do [employer]kn[employer]       PLAN:   Plan    Continue the careful diet   Stay with the medicines  See Chidi Steve for the walking and gait   See in about 6 months          Azael Marie MD

## 2025-01-13 ENCOUNTER — TELEPHONE (OUTPATIENT)
Dept: FAMILY MEDICINE CLINIC | Age: 80
End: 2025-01-13

## 2025-02-10 LAB — PSA SERPL DL<=0.01 NG/ML-MCNC: 1.87 NG/ML (ref 0–4)

## 2025-03-05 NOTE — PROGRESS NOTES
[facility] [place]    Dick Gonzalez  1945 March 6, 2025    CC: \" I am doing well\"    HPI:  The patient is 79 y.o. male with a past medical history significant for atrial fibrillation, hyperlipidemia and hypothyroidism. He was referred to Dr. Hernandez for chronic venous insufficiency s/p Varithena procedure of the left GSV.     He is retired now and states not exercising like he used to since August.  States gets up in am takes a hot shower and getting back into exercising 20 minutes a day.  States his leg is doing well since the procedure with Dr Hernandez.       Today he presents for follow up and overall he is feeling well.  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  He has not felt his heart racing or skipping beats.     Review of Systems:  Constitutional: No fatigue, weakness, night sweats or fever.   HEENT: No new vision difficulties or ringing in the ears.  Respiratory: No new SOB, PND, orthopnea or cough.   Cardiovascular: See HPI   GI: No n/v, diarrhea, constipation, abdominal pain or changes in bowel habits.  No melena, no hematochezia  : No urinary frequency, urgency, incontinence, hematuria or dysuria.  Skin: No cyanosis or skin lesions.  Musculoskeletal: No new muscle or joint pain.  Neurological: No syncope or TIA-like symptoms.  Psychiatric: No anxiety, insomnia or depression     Past Medical History:   Diagnosis Date    Atrial fibrillation (HCC)     new onset    Kidney stone     Mixed hyperlipidemia     managed by PCP    Near syncope     Palpitations     Prostate enlargement     Syncope and collapse      Past Surgical History:   Procedure Laterality Date    COLONOSCOPY  9/26/2001    negativedavid, also neg sigmoid 2007    COLONOSCOPY  2/15/2011    negative dr hernandez    COLONOSCOPY  08/23/2021    dr sumner, polyps, repeat in 5 years    KIDNEY STONE SURGERY       Family History   Problem Relation Age of Onset    Rheum Arthritis Brother     Heart

## 2025-03-06 ENCOUNTER — OFFICE VISIT (OUTPATIENT)
Dept: CARDIOLOGY CLINIC | Age: 80
End: 2025-03-06
Payer: MEDICARE

## 2025-03-06 VITALS
HEIGHT: 65 IN | SYSTOLIC BLOOD PRESSURE: 126 MMHG | BODY MASS INDEX: 29.66 KG/M2 | DIASTOLIC BLOOD PRESSURE: 64 MMHG | OXYGEN SATURATION: 98 % | HEART RATE: 85 BPM | WEIGHT: 178 LBS

## 2025-03-06 DIAGNOSIS — E78.5 HYPERLIPIDEMIA LDL GOAL <130: ICD-10-CM

## 2025-03-06 DIAGNOSIS — I48.19 PERSISTENT ATRIAL FIBRILLATION (HCC): Primary | ICD-10-CM

## 2025-03-06 DIAGNOSIS — I87.2 VENOUS INSUFFICIENCY: ICD-10-CM

## 2025-03-06 PROCEDURE — G8427 DOCREV CUR MEDS BY ELIG CLIN: HCPCS | Performed by: INTERNAL MEDICINE

## 2025-03-06 PROCEDURE — 1123F ACP DISCUSS/DSCN MKR DOCD: CPT | Performed by: INTERNAL MEDICINE

## 2025-03-06 PROCEDURE — G8417 CALC BMI ABV UP PARAM F/U: HCPCS | Performed by: INTERNAL MEDICINE

## 2025-03-06 PROCEDURE — 1159F MED LIST DOCD IN RCRD: CPT | Performed by: INTERNAL MEDICINE

## 2025-03-06 PROCEDURE — G2211 COMPLEX E/M VISIT ADD ON: HCPCS | Performed by: INTERNAL MEDICINE

## 2025-03-06 PROCEDURE — 1036F TOBACCO NON-USER: CPT | Performed by: INTERNAL MEDICINE

## 2025-03-06 PROCEDURE — 93000 ELECTROCARDIOGRAM COMPLETE: CPT | Performed by: INTERNAL MEDICINE

## 2025-03-06 PROCEDURE — 99214 OFFICE O/P EST MOD 30 MIN: CPT | Performed by: INTERNAL MEDICINE

## 2025-03-06 RX ORDER — NYSTATIN 100000 U/G
CREAM TOPICAL DAILY
COMMUNITY

## 2025-03-06 RX ORDER — DICLOFENAC SODIUM 1 MG/ML
1 SOLUTION/ DROPS OPHTHALMIC 4 TIMES DAILY
COMMUNITY

## 2025-03-06 RX ORDER — OMEGA-3 FATTY ACIDS/FISH OIL 300-1000MG
CAPSULE ORAL
COMMUNITY

## 2025-03-31 RX ORDER — PRAVASTATIN SODIUM 40 MG
40 TABLET ORAL NIGHTLY
Qty: 90 TABLET | Refills: 2 | Status: SHIPPED | OUTPATIENT
Start: 2025-03-31

## 2025-04-23 ENCOUNTER — APPOINTMENT (OUTPATIENT)
Dept: CT IMAGING | Age: 80
DRG: 069 | End: 2025-04-23
Payer: MEDICARE

## 2025-04-23 ENCOUNTER — APPOINTMENT (OUTPATIENT)
Dept: GENERAL RADIOLOGY | Age: 80
DRG: 069 | End: 2025-04-23
Payer: MEDICARE

## 2025-04-23 ENCOUNTER — HOSPITAL ENCOUNTER (INPATIENT)
Age: 80
LOS: 1 days | Discharge: HOME | DRG: 069 | End: 2025-04-24
Attending: STUDENT IN AN ORGANIZED HEALTH CARE EDUCATION/TRAINING PROGRAM | Admitting: FAMILY MEDICINE
Payer: MEDICARE

## 2025-04-23 DIAGNOSIS — I65.23 BILATERAL CAROTID ARTERY STENOSIS: ICD-10-CM

## 2025-04-23 DIAGNOSIS — R29.90 STROKE-LIKE SYMPTOMS: ICD-10-CM

## 2025-04-23 DIAGNOSIS — R26.81 GAIT INSTABILITY: Primary | ICD-10-CM

## 2025-04-23 DIAGNOSIS — R42 DIZZINESS: ICD-10-CM

## 2025-04-23 DIAGNOSIS — R42 VERTIGO: ICD-10-CM

## 2025-04-23 LAB
ALBUMIN SERPL-MCNC: 4.5 G/DL (ref 3.4–5)
ALBUMIN/GLOB SERPL: 1.9 {RATIO} (ref 1.1–2.2)
ALP SERPL-CCNC: 62 U/L (ref 40–129)
ALT SERPL-CCNC: 28 U/L (ref 10–40)
ANION GAP SERPL CALCULATED.3IONS-SCNC: 9 MMOL/L (ref 3–16)
AST SERPL-CCNC: 33 U/L (ref 15–37)
BASOPHILS # BLD: 0 K/UL (ref 0–0.2)
BASOPHILS NFR BLD: 0.6 %
BILIRUB SERPL-MCNC: 0.6 MG/DL (ref 0–1)
BUN SERPL-MCNC: 21 MG/DL (ref 7–20)
CALCIUM SERPL-MCNC: 9.2 MG/DL (ref 8.3–10.6)
CHLORIDE SERPL-SCNC: 105 MMOL/L (ref 99–110)
CO2 SERPL-SCNC: 26 MMOL/L (ref 21–32)
CREAT SERPL-MCNC: 1.1 MG/DL (ref 0.8–1.3)
DEPRECATED RDW RBC AUTO: 12.7 % (ref 12.4–15.4)
EKG ATRIAL RATE: 394 BPM
EKG DIAGNOSIS: NORMAL
EKG Q-T INTERVAL: 426 MS
EKG QRS DURATION: 84 MS
EKG QTC CALCULATION (BAZETT): 428 MS
EKG R AXIS: 4 DEGREES
EKG T AXIS: 26 DEGREES
EKG VENTRICULAR RATE: 61 BPM
EOSINOPHIL # BLD: 0.3 K/UL (ref 0–0.6)
EOSINOPHIL NFR BLD: 5.2 %
GFR SERPLBLD CREATININE-BSD FMLA CKD-EPI: 69 ML/MIN/{1.73_M2}
GLUCOSE BLD-MCNC: 109 MG/DL
GLUCOSE BLD-MCNC: 109 MG/DL (ref 70–99)
GLUCOSE SERPL-MCNC: 122 MG/DL (ref 70–99)
HCT VFR BLD AUTO: 44.2 % (ref 40.5–52.5)
HGB BLD-MCNC: 14.4 G/DL (ref 13.5–17.5)
IMM GRANULOCYTES # BLD: 0 K/UL (ref 0–0.2)
IMM GRANULOCYTES NFR BLD: 0.2 %
INR PPP: 1.4 (ref 0.85–1.15)
LYMPHOCYTES # BLD: 1.5 K/UL (ref 1–5.1)
LYMPHOCYTES NFR BLD: 30.6 %
MCH RBC QN AUTO: 30.6 PG (ref 26–34)
MCHC RBC AUTO-ENTMCNC: 32.6 G/DL (ref 32–36.4)
MCV RBC AUTO: 93.8 FL (ref 80–100)
MONOCYTES # BLD: 0.6 K/UL (ref 0–1.3)
MONOCYTES NFR BLD: 12.7 %
NEUTROPHILS # BLD: 2.6 K/UL (ref 1.7–7.7)
NEUTROPHILS NFR BLD: 50.7 %
PERFORMED ON: ABNORMAL
PLATELET # BLD AUTO: 193 K/UL (ref 135–450)
PMV BLD AUTO: 9.2 FL (ref 5–10.5)
POTASSIUM SERPL-SCNC: 4.4 MMOL/L (ref 3.5–5.1)
PROT SERPL-MCNC: 6.9 G/DL (ref 6.4–8.2)
PROTHROMBIN TIME: 17.3 SEC (ref 11.9–14.9)
RBC # BLD AUTO: 4.71 M/UL (ref 4.2–5.9)
SODIUM SERPL-SCNC: 140 MMOL/L (ref 136–145)
TROPONIN, HIGH SENSITIVITY: 31 NG/L (ref 0–22)
TROPONIN, HIGH SENSITIVITY: 32 NG/L (ref 0–22)
TROPONIN, HIGH SENSITIVITY: 35 NG/L (ref 0–22)
TROPONIN, HIGH SENSITIVITY: 36 NG/L (ref 0–22)
WBC # BLD AUTO: 5 K/UL (ref 4–11)

## 2025-04-23 PROCEDURE — 70450 CT HEAD/BRAIN W/O DYE: CPT

## 2025-04-23 PROCEDURE — 36415 COLL VENOUS BLD VENIPUNCTURE: CPT

## 2025-04-23 PROCEDURE — 1200000000 HC SEMI PRIVATE

## 2025-04-23 PROCEDURE — 70498 CT ANGIOGRAPHY NECK: CPT

## 2025-04-23 PROCEDURE — 6360000004 HC RX CONTRAST MEDICATION: Performed by: STUDENT IN AN ORGANIZED HEALTH CARE EDUCATION/TRAINING PROGRAM

## 2025-04-23 PROCEDURE — 71045 X-RAY EXAM CHEST 1 VIEW: CPT

## 2025-04-23 PROCEDURE — 6370000000 HC RX 637 (ALT 250 FOR IP): Performed by: HOSPITALIST

## 2025-04-23 PROCEDURE — 99285 EMERGENCY DEPT VISIT HI MDM: CPT

## 2025-04-23 PROCEDURE — 2500000003 HC RX 250 WO HCPCS: Performed by: FAMILY MEDICINE

## 2025-04-23 PROCEDURE — 84484 ASSAY OF TROPONIN QUANT: CPT

## 2025-04-23 PROCEDURE — 93005 ELECTROCARDIOGRAM TRACING: CPT | Performed by: STUDENT IN AN ORGANIZED HEALTH CARE EDUCATION/TRAINING PROGRAM

## 2025-04-23 PROCEDURE — 93010 ELECTROCARDIOGRAM REPORT: CPT | Performed by: INTERNAL MEDICINE

## 2025-04-23 PROCEDURE — 85610 PROTHROMBIN TIME: CPT

## 2025-04-23 PROCEDURE — 85025 COMPLETE CBC W/AUTO DIFF WBC: CPT

## 2025-04-23 PROCEDURE — 94760 N-INVAS EAR/PLS OXIMETRY 1: CPT

## 2025-04-23 PROCEDURE — 80053 COMPREHEN METABOLIC PANEL: CPT

## 2025-04-23 PROCEDURE — 6370000000 HC RX 637 (ALT 250 FOR IP): Performed by: FAMILY MEDICINE

## 2025-04-23 PROCEDURE — 70496 CT ANGIOGRAPHY HEAD: CPT

## 2025-04-23 PROCEDURE — 82947 ASSAY GLUCOSE BLOOD QUANT: CPT

## 2025-04-23 RX ORDER — ONDANSETRON 4 MG/1
4 TABLET, ORALLY DISINTEGRATING ORAL EVERY 8 HOURS PRN
Status: DISCONTINUED | OUTPATIENT
Start: 2025-04-23 | End: 2025-04-24 | Stop reason: HOSPADM

## 2025-04-23 RX ORDER — ASPIRIN 300 MG/1
300 SUPPOSITORY RECTAL DAILY
Status: DISCONTINUED | OUTPATIENT
Start: 2025-04-23 | End: 2025-04-24 | Stop reason: HOSPADM

## 2025-04-23 RX ORDER — ACETAMINOPHEN 325 MG/1
650 TABLET ORAL 2 TIMES DAILY
Status: DISCONTINUED | OUTPATIENT
Start: 2025-04-23 | End: 2025-04-24 | Stop reason: HOSPADM

## 2025-04-23 RX ORDER — ASPIRIN 81 MG/1
81 TABLET, CHEWABLE ORAL DAILY
Status: DISCONTINUED | OUTPATIENT
Start: 2025-04-23 | End: 2025-04-24 | Stop reason: HOSPADM

## 2025-04-23 RX ORDER — FAMOTIDINE 20 MG/1
20 TABLET, FILM COATED ORAL 2 TIMES DAILY
Status: DISCONTINUED | OUTPATIENT
Start: 2025-04-23 | End: 2025-04-24 | Stop reason: HOSPADM

## 2025-04-23 RX ORDER — SODIUM CHLORIDE 9 MG/ML
INJECTION, SOLUTION INTRAVENOUS PRN
Status: DISCONTINUED | OUTPATIENT
Start: 2025-04-23 | End: 2025-04-24 | Stop reason: HOSPADM

## 2025-04-23 RX ORDER — SODIUM CHLORIDE 0.9 % (FLUSH) 0.9 %
5-40 SYRINGE (ML) INJECTION EVERY 12 HOURS SCHEDULED
Status: DISCONTINUED | OUTPATIENT
Start: 2025-04-23 | End: 2025-04-24 | Stop reason: HOSPADM

## 2025-04-23 RX ORDER — POLYETHYLENE GLYCOL 3350 17 G/17G
17 POWDER, FOR SOLUTION ORAL DAILY PRN
Status: DISCONTINUED | OUTPATIENT
Start: 2025-04-23 | End: 2025-04-24 | Stop reason: HOSPADM

## 2025-04-23 RX ORDER — PRAVASTATIN SODIUM 40 MG
40 TABLET ORAL NIGHTLY
Status: DISCONTINUED | OUTPATIENT
Start: 2025-04-23 | End: 2025-04-24 | Stop reason: HOSPADM

## 2025-04-23 RX ORDER — ATORVASTATIN CALCIUM 80 MG/1
80 TABLET, FILM COATED ORAL NIGHTLY
Status: DISCONTINUED | OUTPATIENT
Start: 2025-04-23 | End: 2025-04-23

## 2025-04-23 RX ORDER — SODIUM CHLORIDE 0.9 % (FLUSH) 0.9 %
5-40 SYRINGE (ML) INJECTION PRN
Status: DISCONTINUED | OUTPATIENT
Start: 2025-04-23 | End: 2025-04-24 | Stop reason: HOSPADM

## 2025-04-23 RX ORDER — IOPAMIDOL 755 MG/ML
100 INJECTION, SOLUTION INTRAVASCULAR
Status: COMPLETED | OUTPATIENT
Start: 2025-04-23 | End: 2025-04-23

## 2025-04-23 RX ORDER — ENOXAPARIN SODIUM 100 MG/ML
40 INJECTION SUBCUTANEOUS NIGHTLY
Status: DISCONTINUED | OUTPATIENT
Start: 2025-04-23 | End: 2025-04-23

## 2025-04-23 RX ORDER — ONDANSETRON 2 MG/ML
4 INJECTION INTRAMUSCULAR; INTRAVENOUS EVERY 6 HOURS PRN
Status: DISCONTINUED | OUTPATIENT
Start: 2025-04-23 | End: 2025-04-24 | Stop reason: HOSPADM

## 2025-04-23 RX ORDER — METOPROLOL SUCCINATE 50 MG/1
50 TABLET, EXTENDED RELEASE ORAL 2 TIMES DAILY
Status: DISCONTINUED | OUTPATIENT
Start: 2025-04-23 | End: 2025-04-24 | Stop reason: HOSPADM

## 2025-04-23 RX ADMIN — ASPIRIN 81 MG: 81 TABLET, CHEWABLE ORAL at 20:26

## 2025-04-23 RX ADMIN — METOPROLOL SUCCINATE 50 MG: 50 TABLET, EXTENDED RELEASE ORAL at 20:29

## 2025-04-23 RX ADMIN — ACETAMINOPHEN 650 MG: 325 TABLET ORAL at 20:26

## 2025-04-23 RX ADMIN — PRAVASTATIN SODIUM 40 MG: 40 TABLET ORAL at 20:29

## 2025-04-23 RX ADMIN — SODIUM CHLORIDE, PRESERVATIVE FREE 10 ML: 5 INJECTION INTRAVENOUS at 20:31

## 2025-04-23 RX ADMIN — SODIUM CHLORIDE, PRESERVATIVE FREE 10 ML: 5 INJECTION INTRAVENOUS at 20:30

## 2025-04-23 RX ADMIN — FAMOTIDINE 20 MG: 20 TABLET, FILM COATED ORAL at 20:29

## 2025-04-23 RX ADMIN — IOPAMIDOL 100 ML: 755 INJECTION, SOLUTION INTRAVENOUS at 10:15

## 2025-04-23 ASSESSMENT — PAIN - FUNCTIONAL ASSESSMENT: PAIN_FUNCTIONAL_ASSESSMENT: 0-10

## 2025-04-23 ASSESSMENT — PAIN DESCRIPTION - LOCATION: LOCATION: GENERALIZED

## 2025-04-23 ASSESSMENT — PAIN SCALES - GENERAL
PAINLEVEL_OUTOF10: 0
PAINLEVEL_OUTOF10: 2

## 2025-04-23 ASSESSMENT — PAIN DESCRIPTION - DESCRIPTORS: DESCRIPTORS: ACHING

## 2025-04-23 NOTE — PROGRESS NOTES
Patient arrived to room 5122 via [facility] stretcher at 1815.  Patient alert and oriented x 4.  Oriented to room and call light.  Telemetry box 24 applied to patient and verified atrial fibrillation rate in the 60's with CMU.  Swallow screen passed.  Electronically signed by Simona Grant RN on 4/23/2025 at 6:45 PM

## 2025-04-23 NOTE — H&P
HISTORY AND PHYSICAL             Date: 4/23/2025        Patient Name: Dick Gonzalez     YOB: 1945      Age:  79 y.o.    Chief Complaint     Chief Complaint   Patient presents with    Extremity Weakness     Pt to ED with complaint of symptoms of \"possible stroke\". Pt states yesterday his symptoms started at 1600. Pt states this morning at 0430, he felt like his left leg was \"heavy\" and balance was still off and he was walking \"off the walls\".           History Obtained From   patient    History of Present Illness   79m with history of atrial fibrillation, presents to the ER following onset of \"loss of balance\" yesterday at 3:30 pm while standing in line at a store. Episode lasted approximately 30 minutes, resolved as patient was able to continue working on his garden for the next several hours. He had recurrence of symptoms with leaning to left while ambulating from the bathroom around 4 am. Then, around 7 am recurred with sensation of L leg heaviness.  Denies n/v/d, fever, chills, headache, visual changes, palpitations, chest pain, DE SOUZA.    Past Medical History     Past Medical History:   Diagnosis Date    Atrial fibrillation (HCC)     new onset    Kidney stone     Mixed hyperlipidemia     managed by PCP    Near syncope     Palpitations     Prostate enlargement     Syncope and collapse         Past Surgical History     Past Surgical History:   Procedure Laterality Date    COLONOSCOPY  9/26/2001    negative, david, also neg sigmoid 2007    COLONOSCOPY  2/15/2011    negative dr hernandez    COLONOSCOPY  08/23/2021    dr sumner, polyps, repeat in 5 years    KIDNEY STONE SURGERY          Medications Prior to Admission     Prior to Admission medications    Medication Sig Start Date End Date Taking? Authorizing Provider   pravastatin (PRAVACHOL) 40 MG tablet TAKE 1 TABLET BY MOUTH EVERY EVENING 3/31/25  Yes Azael Marie MD   Omega 3 1000 MG CAPS Take by mouth   Yes ProviderSebas MD   XARELTO 20 MG TABS  4/23/2025  EXAMINATION: CT OF THE HEAD WITHOUT CONTRAST  4/23/2025 10:07 am TECHNIQUE: CT of the head was performed without the administration of intravenous contrast. Automated exposure control, iterative reconstruction, and/or weight based adjustment of the mA/kV was utilized to reduce the radiation dose to as low as reasonably achievable. COMPARISON: Brain MRI 23 March 2023 HISTORY: ORDERING SYSTEM PROVIDED HISTORY: Stroke Symptoms TECHNOLOGIST PROVIDED HISTORY: Has a \"code stroke\" or \"stroke alert\" been called?->Yes Reason for exam:->Stroke Symptoms Decision Support Exception - unselect if not a suspected or confirmed emergency medical condition->Emergency Medical Condition (MA) Reason for Exam: complaint of symptoms of \"possible stroke\". Pt states yesterday his symptoms started at 1600. Pt states this morning at 0430, he felt like his left leg was \"heavy\" and balance was still off and he was walking \"off the walls\" FINDINGS: BRAIN/VENTRICLES: There is no acute intracranial hemorrhage, mass effect or midline shift.  No abnormal extra-axial fluid collection.  The gray-white differentiation is maintained without evidence of an acute infarct.  There is no evidence of hydrocephalus. Decreased attenuation in the deep right and left cerebral white matter.  Prominent but symmetrical lateral ventricles and prominent sulci. ORBITS: The visualized portion of the orbits demonstrate no acute abnormality. SINUSES: The visualized paranasal sinuses and mastoid air cells demonstrate no acute abnormality. SOFT TISSUES/SKULL:  No acute abnormality of the visualized skull or soft tissues.     No acute intracranial abnormality. No change compared to the prior study.     XR CHEST PORTABLE  Result Date: 4/23/2025  EXAMINATION: ONE XRAY VIEW OF THE CHEST 4/23/2025 10:11 am COMPARISON: None. HISTORY: ORDERING SYSTEM PROVIDED HISTORY: stroke symptoms TECHNOLOGIST PROVIDED HISTORY: Reason for exam:->stroke symptoms Reason for Exam:

## 2025-04-23 NOTE — ED PROVIDER NOTES
[facility]    CHIEF COMPLAINT  Extremity Weakness (Pt to ED with complaint of symptoms of \"possible stroke\". Pt states yesterday his symptoms started at 1600. Pt states this morning at 0430, he felt like his left leg was \"heavy\" and balance was still off and he was walking \"off the walls\". )       HISTORY OF PRESENT ILLNESS  Dick Gonzalez is a 79 y.o. male presenting to the ED for strokelike symptoms.  Patient subjectively reports left lower leg weakness and dizziness.  Patient states that around 4 PM yesterday he developed gait instability and vertigo that lasted 30 minutes.  Patient states vertigo was so severe that it was difficult for him to walk.  Symptoms resolved after 30 minutes.  Patient denies any hearing loss, ear ringing, nausea, vomiting, chest pain, shortness of breath, syncope.  Patient then had reoccurring episodes that happen at 4 AM that lasted 20 minutes.  At that time patient felt like his left foot was stuck to the floor and it was extremely heavy to move.  Patient woke up this morning had a recurrent episode of gait instability.  Patient came into the emergency department for further evaluation.  Patient states he has a past medical history of atrial fibrillation and currently is on anticoagulation.  Patient states he is compliant with his anticoagulation medication.    - History obtained from: Patient  - Limitations to history: None    I have reviewed the following from the nursing documentation:    Past Medical History:   Diagnosis Date    Atrial fibrillation (HCC)     new onset    Kidney stone     Mixed hyperlipidemia     managed by PCP    Near syncope     Palpitations     Prostate enlargement     Syncope and collapse      Past Surgical History:   Procedure Laterality Date    COLONOSCOPY  9/26/2001    negative, david, also neg sigmoid 2007    COLONOSCOPY  2/15/2011    negative dr hernandez    COLONOSCOPY  08/23/2021    dr sumner, polyps, repeat in 5 years    KIDNEY STONE    Result Value Ref Range    Troponin, High Sensitivity 36 (H) 0 - 22 ng/L   Protime-INR   Result Value Ref Range    Protime 17.3 (H) 11.9 - 14.9 sec    INR 1.40 (H) 0.85 - 1.15   Troponin   Result Value Ref Range    Troponin, High Sensitivity 35 (H) 0 - 22 ng/L   POCT Glucose   Result Value Ref Range    POC Glucose 109 (H) 70 - 99 mg/dl    Performed on ACCU-CHEK    POCT Glucose   Result Value Ref Range    Glucose 109 mg/dL   EKG 12 Lead   Result Value Ref Range    Ventricular Rate 61 BPM    Atrial Rate 394 BPM    QRS Duration 84 ms    Q-T Interval 426 ms    QTc Calculation (Bazett) 428 ms    R Axis 4 degrees    T Axis 26 degrees    Diagnosis       Atrial fibrillationLow voltage QRSAbnormal ECGWhen compared with ECG of 29-NOV-2020 10:36,No significant change was found         RADIOLOGY  I have reviewed all radiographic studies for this visit.   XR CHEST PORTABLE   Final Result   1. No acute cardiopulmonary process.   2. No change compared to the prior study.         CTA HEAD NECK W CONTRAST   Preliminary Result   1. A 75% stenosis in the distal left common carotid artery/carotid bulb.   2. A 75% stenosis in the proximal left internal carotid artery using NASCET   criteria.   3. A 40-50% stenosis in the right carotid bulb.   4. A 50% stenosis in the V4 segment of the left vertebral artery.   5. Severe stenoses in the P1 segment of the right posterior cerebral artery   and P2 segment of the left posterior cerebral artery.   6. Mild narrowing in the supraclinoid ICAs bilaterally.         CT HEAD WO CONTRAST   Final Result   Addendum (preliminary) 1 of 1   ADDENDUM:   Verbal results given to Dr. Villatoro         Final   No acute intracranial abnormality.      No change compared to the prior study.                My ECG interpretation  Atrial fibrillation, ventricular rate of 61, no STEMI    ED COURSE/MDM    79 y.o. male presenting to the ED for strokelike symptoms.  Patient is hemodynamic stable upon arrival to the

## 2025-04-23 NOTE — ED NOTES
Awaiting bed over at [facility] at this time. Pt remains on cardiac and SPO2 monitoring. Wife at bedside. No acute changes in status. Call light within reach.

## 2025-04-23 NOTE — ED NOTES
Pt now going to room 5122 at [facility]. [facility] to transport patient at 1730. Pt remains on monitor and updated on plan of care.

## 2025-04-24 ENCOUNTER — APPOINTMENT (OUTPATIENT)
Dept: MRI IMAGING | Age: 80
DRG: 069 | End: 2025-04-24
Payer: MEDICARE

## 2025-04-24 VITALS
OXYGEN SATURATION: 99 % | RESPIRATION RATE: 18 BRPM | SYSTOLIC BLOOD PRESSURE: 105 MMHG | WEIGHT: 172.18 LBS | HEIGHT: 66 IN | DIASTOLIC BLOOD PRESSURE: 70 MMHG | HEART RATE: 73 BPM | TEMPERATURE: 97.6 F | BODY MASS INDEX: 27.67 KG/M2

## 2025-04-24 LAB
ALBUMIN SERPL-MCNC: 3.9 G/DL (ref 3.4–5)
ALBUMIN/GLOB SERPL: 1.8 {RATIO} (ref 1.1–2.2)
ALP SERPL-CCNC: 55 U/L (ref 40–129)
ALT SERPL-CCNC: 23 U/L (ref 10–40)
ANION GAP SERPL CALCULATED.3IONS-SCNC: 9 MMOL/L (ref 3–16)
AST SERPL-CCNC: 26 U/L (ref 15–37)
BILIRUB SERPL-MCNC: 0.4 MG/DL (ref 0–1)
BUN SERPL-MCNC: 16 MG/DL (ref 7–20)
CALCIUM SERPL-MCNC: 8.7 MG/DL (ref 8.3–10.6)
CHLORIDE SERPL-SCNC: 109 MMOL/L (ref 99–110)
CHOLEST SERPL-MCNC: 173 MG/DL (ref 0–199)
CO2 SERPL-SCNC: 22 MMOL/L (ref 21–32)
CREAT SERPL-MCNC: 0.9 MG/DL (ref 0.8–1.3)
DEPRECATED RDW RBC AUTO: 13.4 % (ref 12.4–15.4)
EKG DIAGNOSIS: NORMAL
EKG Q-T INTERVAL: 426 MS
EKG QRS DURATION: 86 MS
EKG QTC CALCULATION (BAZETT): 421 MS
EKG R AXIS: 20 DEGREES
EKG T AXIS: 28 DEGREES
EKG VENTRICULAR RATE: 59 BPM
EST. AVERAGE GLUCOSE BLD GHB EST-MCNC: 111.2 MG/DL
GFR SERPLBLD CREATININE-BSD FMLA CKD-EPI: 88 ML/MIN/{1.73_M2}
GLUCOSE SERPL-MCNC: 89 MG/DL (ref 70–99)
HBA1C MFR BLD: 5.5 %
HCT VFR BLD AUTO: 41.4 % (ref 40.5–52.5)
HDLC SERPL-MCNC: 46 MG/DL (ref 40–60)
HGB BLD-MCNC: 14.2 G/DL (ref 13.5–17.5)
LDLC SERPL CALC-MCNC: 114 MG/DL
MCH RBC QN AUTO: 31.6 PG (ref 26–34)
MCHC RBC AUTO-ENTMCNC: 34.1 G/DL (ref 31–36)
MCV RBC AUTO: 92.5 FL (ref 80–100)
PLATELET # BLD AUTO: 174 K/UL (ref 135–450)
PMV BLD AUTO: 7.7 FL (ref 5–10.5)
POTASSIUM SERPL-SCNC: 4.1 MMOL/L (ref 3.5–5.1)
POTASSIUM SERPL-SCNC: 4.1 MMOL/L (ref 3.5–5.1)
PROT SERPL-MCNC: 6.1 G/DL (ref 6.4–8.2)
RBC # BLD AUTO: 4.48 M/UL (ref 4.2–5.9)
SODIUM SERPL-SCNC: 140 MMOL/L (ref 136–145)
TRIGL SERPL-MCNC: 64 MG/DL (ref 0–150)
VLDLC SERPL CALC-MCNC: 13 MG/DL
WBC # BLD AUTO: 5.1 K/UL (ref 4–11)

## 2025-04-24 PROCEDURE — 83036 HEMOGLOBIN GLYCOSYLATED A1C: CPT

## 2025-04-24 PROCEDURE — 70551 MRI BRAIN STEM W/O DYE: CPT

## 2025-04-24 PROCEDURE — 97530 THERAPEUTIC ACTIVITIES: CPT

## 2025-04-24 PROCEDURE — 2500000003 HC RX 250 WO HCPCS: Performed by: FAMILY MEDICINE

## 2025-04-24 PROCEDURE — 97165 OT EVAL LOW COMPLEX 30 MIN: CPT

## 2025-04-24 PROCEDURE — 80061 LIPID PANEL: CPT

## 2025-04-24 PROCEDURE — 94760 N-INVAS EAR/PLS OXIMETRY 1: CPT

## 2025-04-24 PROCEDURE — 80053 COMPREHEN METABOLIC PANEL: CPT

## 2025-04-24 PROCEDURE — 97116 GAIT TRAINING THERAPY: CPT

## 2025-04-24 PROCEDURE — 97161 PT EVAL LOW COMPLEX 20 MIN: CPT

## 2025-04-24 PROCEDURE — 6370000000 HC RX 637 (ALT 250 FOR IP): Performed by: HOSPITALIST

## 2025-04-24 PROCEDURE — 99222 1ST HOSP IP/OBS MODERATE 55: CPT | Performed by: SURGERY

## 2025-04-24 PROCEDURE — 36415 COLL VENOUS BLD VENIPUNCTURE: CPT

## 2025-04-24 PROCEDURE — 85027 COMPLETE CBC AUTOMATED: CPT

## 2025-04-24 RX ORDER — ASPIRIN 81 MG/1
81 TABLET, CHEWABLE ORAL DAILY
Qty: 30 TABLET | Refills: 0 | Status: SHIPPED | OUTPATIENT
Start: 2025-04-25 | End: 2025-05-25

## 2025-04-24 RX ADMIN — METOPROLOL SUCCINATE 50 MG: 50 TABLET, EXTENDED RELEASE ORAL at 09:26

## 2025-04-24 RX ADMIN — ACETAMINOPHEN 650 MG: 325 TABLET ORAL at 09:26

## 2025-04-24 RX ADMIN — FAMOTIDINE 20 MG: 20 TABLET, FILM COATED ORAL at 09:26

## 2025-04-24 RX ADMIN — RIVAROXABAN 20 MG: 20 TABLET, FILM COATED ORAL at 09:26

## 2025-04-24 RX ADMIN — SODIUM CHLORIDE, PRESERVATIVE FREE 10 ML: 5 INJECTION INTRAVENOUS at 09:31

## 2025-04-24 ASSESSMENT — ENCOUNTER SYMPTOMS
RESPIRATORY NEGATIVE: 1
GASTROINTESTINAL NEGATIVE: 1

## 2025-04-24 ASSESSMENT — PAIN DESCRIPTION - LOCATION: LOCATION: OTHER (COMMENT)

## 2025-04-24 ASSESSMENT — PAIN SCALES - GENERAL: PAINLEVEL_OUTOF10: 1

## 2025-04-24 ASSESSMENT — PAIN DESCRIPTION - DESCRIPTORS: DESCRIPTORS: ACHING

## 2025-04-24 NOTE — PROGRESS NOTES
[facility] - Physical Therapy   Phone: [phone]    Physical Therapy  Facility/Department:[address] PROGRESSIVE CARE    [x] Initial Evaluation            [x] Daily Treatment Note         [] Discharge Summary      Patient: Dick Gonzalez   : 1945   MRN: [id number]   Date of Service:  2025  Staff Mobility Recommendation: x1 with gait belt, no device    AM-PAC score: 20/24  Discharge Recommendations: home with assist PRN and outpatient PT    Dick Gonzalez scored a 20/ on the AM-PAC short mobility form. Current research shows that an AM-PAC score of 18 or greater is typically associated with a discharge to the patient's home setting. Based on the patient's AM-PAC score and their current functional mobility deficits, it is recommended that the patient have 2-3 sessions per week of Physical Therapy at d/c to increase the patient's independence.  At this time, this patient demonstrates the endurance and safety to discharge home with outpatient PT and a follow up treatment frequency of 2-3x/wk.  Please see assessment section for further patient specific details.    If patient discharges prior to next session this note will serve as a discharge summary.  Please see below for the latest assessment towards goals.       Admitting Diagnosis: Stroke-like symptoms  Ordering Physician: Sonia Davenport MD   Current Admission Summary: Ras Salas MD \"79m with history of atrial fibrillation, presents to the ER following onset of \"loss of balance\" yesterday at 3:30 pm while standing in line at a store. Episode lasted approximately 30 minutes, resolved as patient was able to continue working on his garden for the next several hours. He had recurrence of symptoms with leaning to left while ambulating from the bathroom around 4 am. Then, around 7 am recurred with sensation of L leg heaviness.\"    Past Medical History:  has a past medical history of Atrial fibrillation (HCC), Kidney stone, Mixed hyperlipidemia,

## 2025-04-24 NOTE — PLAN OF CARE
Problem: Neurosensory - Adult  Goal: Achieves stable or improved neurological status  Flowsheets (Taken 4/24/2025 7605)  Achieves stable or improved neurological status:   Assess for and report changes in neurological status   Initiate measures to prevent increased intracranial pressure   Maintain blood pressure and fluid volume within ordered parameters to optimize cerebral perfusion and minimize risk of hemorrhage   Monitor temperature, glucose, and sodium. Initiate appropriate interventions as ordered     Problem: Discharge Planning  Goal: Discharge to home or other facility with appropriate resources  Outcome: Not Progressing

## 2025-04-24 NOTE — DISCHARGE SUMMARY
V2.0  Discharge Summary    Name:  Dick Gonzalez /Age/Sex: 1945 (79 y.o. male)   Admit Date: 2025  Discharge Date: 25    MRN & CSN:  [id number] & [id number] Encounter Date and Time 25 2:51 PM EDT    Attending:  Kristel Broderick MD Discharging Provider: Kristel Broderick MD       Hospital Course:     Brief HPI: Dick Gonzalez is a 79 y.o. male with medical history of A-fib, hyperlipidemia presenting the hospital with lightheadedness, associated with difficulty ambulation, and lower extremity weakness. CT head negative for acute intracranial abnormality. CTA head and neck showed bilateral carotid stenosis, worse on the left.  MRI brain completed negative for any acute stroke.  Seen by vascular surgery, no indication for acute vascular surgery at this point, recommended carotid Dopplers which will be scheduled outpatient.  Patient seen by neurology as well.  Symptoms resolved in hospital, patient back to baseline.  Able to ambulate with no difficulty on day of discharge.  Neurology recommended to consider echocardiogram.  This will be done outpatient.  Vascular surgery team is willing to follow-up with echocardiogram results as well.  Carotid duplex and echocardiogram to be completed outpatient.    Brief Problem Based Course:   Strokelike symptoms, likely TIA.  Acute ischemic stroke ruled out.  Bilateral carotid disease, patient takes Xarelto, started on aspirin.  Carotid Duplex to be completed outpatient.  Follow-up with vascular surgery in 2 weeks.    History of A-fib, continue Xarelto  Hyperlipidemia    The patient expressed appropriate understanding of, and agreement with the discharge recommendations, medications, and plan.     Consults this admission:  IP CONSULT TO NEUROLOGY  IP CONSULT TO CASE MANAGEMENT  IP CONSULT TO VASCULAR SURGERY    Discharge Diagnosis:   Stroke-like symptoms    Discharge Instruction:   Follow up appointments: Vascular surgery  Primary care physician: Azael Marie MD  within 1 week  Diet: regular diet   Activity: activity as tolerated  Disposition: Discharged to:   [x]Home, []C, []SNF, []Acute Rehab, []Hospice   Condition on discharge: Stable  Labs and Tests to be Followed up as an outpatient by PCP or Specialist:     Discharge Medications:        Medication List        START taking these medications      aspirin 81 MG chewable tablet  Take 1 tablet by mouth daily  Start taking on: April 25, 2025            CONTINUE taking these medications      Acetaminophen 650 MG Tabs     clotrimazole 1 % cream  Commonly known as: LOTRIMIN  Apply topically 2 times daily Apply topically 2 times daily. For 2 week     diclofenac sodium 1 % Gel  Commonly known as: VOLTAREN     MADHU MULTIVITAMIN FOR MEN PO     metoprolol succinate 50 MG extended release tablet  Commonly known as: TOPROL XL  TAKE 1 TABLET BY MOUTH DAILY     mometasone 0.1 % cream  Commonly known as: Elocon  Apply topically daily.     nystatin 460844 UNIT/GM cream  Commonly known as: MYCOSTATIN     Omega 3 1000 MG Caps     pravastatin 40 MG tablet  Commonly known as: PRAVACHOL  TAKE 1 TABLET BY MOUTH EVERY EVENING     sildenafil 20 MG tablet  Commonly known as: REVATIO     Xarelto 20 MG Tabs tablet  Generic drug: rivaroxaban  TAKE 1 TABLET BY MOUTH DAILY WITH BREAKFAST               Where to Get Your Medications        These medications were sent to [id number] - [place], [place] - [address] - P [phone] - F [phone]  [address] [place] [address]      Phone: [phone]   aspirin 81 MG chewable tablet        Objective Findings at Discharge:   /70   Pulse 73   Temp 97.6 °F (36.4 °C) (Oral)   Resp 18   Ht 1.664 m (5' 5.5\")   Wt 78.1 kg (172 lb 2.9 oz)   SpO2 99%   BMI 28.22 kg/m²       Physical Exam:     General: NAD  Eyes: EOMI  ENT: neck supple  Cardiovascular: Regular rate.  Respiratory: Clear to auscultation  Gastrointestinal: Soft, non tender  Genitourinary: no suprapubic

## 2025-04-24 NOTE — CONSULTS
Neurology Consult Note  Reason for Consult: stroke like symptoms, gait instability, dizzy    Chief complaint: off balance, LLE weakness    Kristel Bates MD asked me to see Dick Gonzalez in consultation for evaluation of stroke like symptoms, gait instability, dizzy    History of Present Illness:  Dick Gonzalez is a 79 y.o. male who presents with imbalance, LLE weakness.     I obtained my information via interview w/ the patient and his wife at the bedside, supplemented by chart review.     Tuesday that patient says that he was shopping and while checking out he sort of started walking in circles like he needed something to lean on or he would fall.  He was able to gather himself and seemed to be OK afterwards.      Wednesday morning he says that he got out of the shower and at one point it felt like his LLE was stuck to the ground.  He soon realized that it was just heavy and weak.  He again felt off balance and like he was going in circles. He was able to sit down and eventually lay down which made him feel better in general.  After about an hour of laying down he was able to get up and his LLE was much improved and was gradually improving back to baseline.  He came to the ED around 0930 in order to be evaluated.     BP was 163/63.  CT head negative.  CTA head/neck w/ multivessel stenosis as below.      Currently he feels fine.  He has no specific neurologic complaints.     This has never happened to him previously.      Does have a hx of atrial fibrillation.  He is on Xarelto.  He says he is compliant over 95% of the time.      Medical History:  Past Medical History:   Diagnosis Date    Atrial fibrillation (HCC)     new onset    Kidney stone     Mixed hyperlipidemia     managed by PCP    Near syncope     Palpitations     Prostate enlargement     Syncope and collapse      Past Surgical History:   Procedure Laterality Date    COLONOSCOPY  9/26/2001    negative, deak, also neg sigmoid 2007    COLONOSCOPY

## 2025-04-24 NOTE — PROGRESS NOTES
Medication Reconciliation    List of medications patient is currently taking is complete.     Source of information: 1. Conversation with patient's family at bedside                                      2. EPIC records      Allergies  Patient has no known allergies.     Notes regarding home medications:   1. Metoprolol frequency changed to daily.

## 2025-04-24 NOTE — CONSULTS
[facility]  Consultation Note    4/24/2025 12:24 PM    Chief Complaint: Difficulty Walking     Reason for Consult: Carotid Stenosis    History of Present Illness:  Patient is a 79 y.o. male who presented to the ED on 4/23/2025 with complaints of Left Leg Heaviness and Difficulty Walking. He has a significant PMH of A-fib (Xarelto 20 mg daily), HLD and Syncope. The patient reports a few days ago, he was at a store and had difficulty walking and felt as though he was going to fall over. This resolved and he had a similar experience yesterday morning. He had difficulty walking and then after showering, he had Left Leg Weakness and thus presented to the ED. He underwent CTA Head/Neck which noted Bilateral Carotid Stenosis (Left>Right). He underwent MRI Brain which was negative for any acute changes. We have been consulted to evaluate the patient for Carotid Stenosis. At present, he is seen sitting in a chair, he is alert and oriented and appears to be in stable condition.    Review of Systems  Review of Systems   Constitutional: Negative.    HENT: Negative.     Respiratory: Negative.     Cardiovascular: Negative.    Gastrointestinal: Negative.    Musculoskeletal:  Positive for gait problem (Episodes of difficulty walking, no issues at baseline).   Skin: Negative.    Psychiatric/Behavioral: Negative.          Past Medical History:   Diagnosis Date    Atrial fibrillation (HCC)     new onset    Kidney stone     Mixed hyperlipidemia     managed by PCP    Near syncope     Palpitations     Prostate enlargement     Syncope and collapse        Past Surgical History:   Procedure Laterality Date    COLONOSCOPY  9/26/2001    negativedavid, also neg sigmoid 2007    COLONOSCOPY  2/15/2011    negative dr hernandez    COLONOSCOPY  08/23/2021    dr sumner, polyps, repeat in 5 years    KIDNEY STONE SURGERY         No Known Allergies    Social History     Socioeconomic History    Marital status:

## 2025-04-24 NOTE — PROGRESS NOTES
SLP NOTE    Evaluation attempted. Patient unavailable out of room at diagnostics. ST to re-attempt as schedule permits unless otherwise notified.    If SLP eval/tx is deemed no longer indicated as medical work-up progresses, please discontinue SLP eval/tx order.    Thank you.  Pia Álvarez MA, CCC-SLP, #[id number]  Speech-Language Pathologist  Portable phone: [phone]

## 2025-04-24 NOTE — PROGRESS NOTES
Patient discharged to home via wheelchair with wife at 1650. Telemetry box 24 returned to [facility].  IV removed.  Discharge instructions reviewed with patient focusing on outpatient carotid doppler appointment made by vascular surgery for tomorrow 4/25/25 at 10 am. Electronically signed by Simona Grant RN on 4/24/2025 at 5:05 PM

## 2025-04-24 NOTE — PROGRESS NOTES
[facility] - Occupational Therapy   Phone: [phone]    Occupational Therapy  Unit:[facility] 5W PROGRESSIVE CARE    [x] Initial Evaluation            [] Daily Treatment Note         [x] Discharge Summary      Patient: Dick Gonzalez   : 1945   MRN: [id number]   Date of Service:  2025    Staff Mobility Recommendation: SBA, no device    AM-PAC Score:   Discharge Recommendations: Dick Gonzalez scored a 24 on the [facility] ADL Inpatient form.  At this time, no further OT is recommended upon discharge due to patient at independent level.  Recommend patient returns to prior setting with prior services.      DME Required For Discharge: No DME required    Admitting Diagnosis:  Stroke-like symptoms  Ordering Physician:     Sonia Davenport MD     Current Admission Summary: Pt is a 80 yo M admitted with stroke-like symptoms after presenting to ED with c/o extremity weakness, balance issues. MRI brain: negative.  Past Medical History:  has a past medical history of Atrial fibrillation (HCC), Kidney stone, Mixed hyperlipidemia, Near syncope, Palpitations, Prostate enlargement, and Syncope and collapse.  Past Surgical History:  has a past surgical history that includes Colonoscopy (2001); Colonoscopy (2/15/2011); Kidney stone surgery; and Colonoscopy (2021).      Clinical Assessment: Patient presenting at/near baseline level for completion of required self care tasks for return to home. Pt declining concerns regarding fxl performance. Eval with d/c at this time.  No acute OT services indicated.       Assessment  Activity Tolerance: good  Impairments Requiring Therapeutic Intervention: none - eval with same day discharge  Prognosis: good  Precautions/Restrictions: low fall risk  Positional Restrictions:no positional restrictions      Home Environment / Functional History  Lives With: Spouse  Type of Home: House  Home Layout: One level ([place]ives on [place] acres)  Home Access: Stairs to enter without

## 2025-04-24 NOTE — PROGRESS NOTES
SLP NOTE    SLP eval/tx order received per stroke r/o protocol.  Patient and wife met at bedside. SLP role/evaluation objectives discussed; patient denies increased motor speech, receptive/expressive/cognitive language, and/or swallowing difficulties at this time.  Patient politely decline needs for SLP eval; wife in agreement.    ST to discontinue evaluation attempts. Please re-consult ST should status change and/or if medical team deems evaluation necessary.    Thank you.  Pia Álvarez MA, CCC-SLP, #[id number]  Speech-Language Pathologist  Portable phone: [phone]

## 2025-04-24 NOTE — CARE COORDINATION
DISCHARGE PLANNING:    Per chart review, patient has no SW/CM needs at this time.  CM consult noted for Stroke/TIA.  Patient is from home with spouse, independent at baseline.  Patient has an active primary care physician with the last visit in October of 2024.  Patient has active health insurance.  Mri pending, Watching neuro recs.    #[phone]  Electronically signed by Shelbi Coates RN on 4/24/2025 at 10:09 AM

## 2025-04-25 ENCOUNTER — HOSPITAL ENCOUNTER (OUTPATIENT)
Dept: VASCULAR LAB | Age: 80
Discharge: HOME | End: 2025-04-27
Attending: SURGERY
Payer: MEDICARE

## 2025-04-25 ENCOUNTER — TELEPHONE (OUTPATIENT)
Dept: FAMILY MEDICINE CLINIC | Age: 80
End: 2025-04-25

## 2025-04-25 DIAGNOSIS — R09.89 BRUIT: ICD-10-CM

## 2025-04-25 LAB
VAS LEFT CCA DIST EDV: 19.9 CM/S
VAS LEFT CCA DIST PSV: 87.4 CM/S
VAS LEFT CCA MID EDV: 23.6 CM/S
VAS LEFT CCA MID PSV: 89.3 CM/S
VAS LEFT CCA PROX EDV: 27.2 CM/S
VAS LEFT CCA PROX PSV: 125.8 CM/S
VAS LEFT ECA EDV: 25.4 CM/S
VAS LEFT ECA PSV: 142.1 CM/S
VAS LEFT ICA DIST EDV: 30.8 CM/S
VAS LEFT ICA DIST PSV: 96.5 CM/S
VAS LEFT ICA MID EDV: 19.9 CM/S
VAS LEFT ICA MID PSV: 85.6 CM/S
VAS LEFT ICA PROX EDV: 32.7 CM/S
VAS LEFT ICA PROX PSV: 103.8 CM/S
VAS LEFT ICA/CCA PSV: 1.2 NO UNITS
VAS LEFT VERTEBRAL EDV: 17.5 CM/S
VAS LEFT VERTEBRAL PSV: 65.8 CM/S
VAS RIGHT CCA DIST EDV: 23.5 CM/S
VAS RIGHT CCA DIST PSV: 103.9 CM/S
VAS RIGHT CCA MID EDV: 23.6 CM/S
VAS RIGHT CCA MID PSV: 105.8 CM/S
VAS RIGHT CCA PROX EDV: 18.1 CM/S
VAS RIGHT CCA PROX PSV: 107.6 CM/S
VAS RIGHT ECA EDV: 21.7 CM/S
VAS RIGHT ECA PSV: 102 CM/S
VAS RIGHT ICA DIST EDV: 27.2 CM/S
VAS RIGHT ICA DIST PSV: 81.9 CM/S
VAS RIGHT ICA MID EDV: 21.7 CM/S
VAS RIGHT ICA MID PSV: 58.2 CM/S
VAS RIGHT ICA PROX EDV: 18.1 CM/S
VAS RIGHT ICA PROX PSV: 120.2 CM/S
VAS RIGHT ICA/CCA PSV: 1.2 NO UNITS
VAS RIGHT VERTEBRAL EDV: 13 CM/S
VAS RIGHT VERTEBRAL PSV: 37.4 CM/S

## 2025-04-25 PROCEDURE — 93880 EXTRACRANIAL BILAT STUDY: CPT

## 2025-04-25 PROCEDURE — 93880 EXTRACRANIAL BILAT STUDY: CPT | Performed by: SURGERY

## 2025-04-25 NOTE — TELEPHONE ENCOUNTER
Care Transitions Initial Follow Up Call    Outreach made within 2 business days of discharge: Yes    Patient: Dick Gonzalez Patient : 1945   MRN: [id number]  Reason for Admission: Stroke-like symptoms  Discharge Date: 25       Spoke with: Louisa    Discharge department/facility: [facility] Interactive Patient Contact:  Was patient able to fill all prescriptions: Yes  Was patient instructed to bring all medications to the follow-up visit: Yes  Is patient taking all medications as directed in the discharge summary? Yes  Does patient understand their discharge instructions: Yes  Does patient have questions or concerns that need addressed prior to 7-14 day follow up office visit: no    Additional needs identified to be addressed with provider  No needs identified             Louisa declined scheduling appt, she states they are seeing Vascular Specialist and doing ultrasound.       Follow Up  Future Appointments   Date Time Provider Department Center   2025 10:30 AM T [facility] LAB 2 [facility]   2025 10:45 AM Viktor Haskins MD [facility]   2025  1:40 PM Aza[facility] MD [facility]       Patti Castro MA

## 2025-04-26 ENCOUNTER — HOSPITAL ENCOUNTER (EMERGENCY)
Age: 80
Discharge: HOME | End: 2025-04-26
Payer: MEDICARE

## 2025-04-26 ENCOUNTER — APPOINTMENT (OUTPATIENT)
Dept: CT IMAGING | Age: 80
End: 2025-04-26
Payer: MEDICARE

## 2025-04-26 VITALS
SYSTOLIC BLOOD PRESSURE: 112 MMHG | OXYGEN SATURATION: 99 % | DIASTOLIC BLOOD PRESSURE: 71 MMHG | TEMPERATURE: 97.7 F | HEART RATE: 73 BPM | RESPIRATION RATE: 16 BRPM

## 2025-04-26 DIAGNOSIS — K62.5 RECTAL BLEEDING: Primary | ICD-10-CM

## 2025-04-26 LAB
ABO/RH: NORMAL
ALBUMIN SERPL-MCNC: 4.5 G/DL (ref 3.4–5)
ALBUMIN/GLOB SERPL: 1.9 {RATIO} (ref 1.1–2.2)
ALP SERPL-CCNC: 65 U/L (ref 40–129)
ALT SERPL-CCNC: 28 U/L (ref 10–40)
ANION GAP SERPL CALCULATED.3IONS-SCNC: 9 MMOL/L (ref 3–16)
ANTIBODY SCREEN: NORMAL
AST SERPL-CCNC: 34 U/L (ref 15–37)
BASOPHILS # BLD: 0.1 K/UL (ref 0–0.2)
BASOPHILS NFR BLD: 1.2 %
BILIRUB SERPL-MCNC: 0.5 MG/DL (ref 0–1)
BUN SERPL-MCNC: 21 MG/DL (ref 7–20)
CALCIUM SERPL-MCNC: 9.2 MG/DL (ref 8.3–10.6)
CHLORIDE SERPL-SCNC: 108 MMOL/L (ref 99–110)
CO2 SERPL-SCNC: 25 MMOL/L (ref 21–32)
CREAT SERPL-MCNC: 1 MG/DL (ref 0.8–1.3)
DEPRECATED RDW RBC AUTO: 13.5 % (ref 12.4–15.4)
EOSINOPHIL # BLD: 0.2 K/UL (ref 0–0.6)
EOSINOPHIL NFR BLD: 5.3 %
GFR SERPLBLD CREATININE-BSD FMLA CKD-EPI: 77 ML/MIN/{1.73_M2}
GLUCOSE SERPL-MCNC: 99 MG/DL (ref 70–99)
HCT VFR BLD AUTO: 44.1 % (ref 40.5–52.5)
HGB BLD-MCNC: 15 G/DL (ref 13.5–17.5)
LYMPHOCYTES # BLD: 1.5 K/UL (ref 1–5.1)
LYMPHOCYTES NFR BLD: 32.3 %
MCH RBC QN AUTO: 31.3 PG (ref 26–34)
MCHC RBC AUTO-ENTMCNC: 34 G/DL (ref 31–36)
MCV RBC AUTO: 92.1 FL (ref 80–100)
MONOCYTES # BLD: 0.6 K/UL (ref 0–1.3)
MONOCYTES NFR BLD: 12.6 %
NEUTROPHILS # BLD: 2.3 K/UL (ref 1.7–7.7)
NEUTROPHILS NFR BLD: 48.6 %
PLATELET # BLD AUTO: 184 K/UL (ref 135–450)
PMV BLD AUTO: 7.6 FL (ref 5–10.5)
POTASSIUM SERPL-SCNC: 4.3 MMOL/L (ref 3.5–5.1)
PROT SERPL-MCNC: 6.9 G/DL (ref 6.4–8.2)
RBC # BLD AUTO: 4.79 M/UL (ref 4.2–5.9)
SODIUM SERPL-SCNC: 142 MMOL/L (ref 136–145)
WBC # BLD AUTO: 4.7 K/UL (ref 4–11)

## 2025-04-26 PROCEDURE — 85025 COMPLETE CBC W/AUTO DIFF WBC: CPT

## 2025-04-26 PROCEDURE — 86901 BLOOD TYPING SEROLOGIC RH(D): CPT

## 2025-04-26 PROCEDURE — 86900 BLOOD TYPING SEROLOGIC ABO: CPT

## 2025-04-26 PROCEDURE — 80053 COMPREHEN METABOLIC PANEL: CPT

## 2025-04-26 PROCEDURE — 99285 EMERGENCY DEPT VISIT HI MDM: CPT

## 2025-04-26 PROCEDURE — 86850 RBC ANTIBODY SCREEN: CPT

## 2025-04-26 PROCEDURE — 74174 CTA ABD&PLVS W/CONTRAST: CPT

## 2025-04-26 PROCEDURE — 6360000004 HC RX CONTRAST MEDICATION: Performed by: PHYSICIAN ASSISTANT

## 2025-04-26 PROCEDURE — 36415 COLL VENOUS BLD VENIPUNCTURE: CPT

## 2025-04-26 RX ORDER — IOPAMIDOL 755 MG/ML
75 INJECTION, SOLUTION INTRAVASCULAR
Status: COMPLETED | OUTPATIENT
Start: 2025-04-26 | End: 2025-04-26

## 2025-04-26 RX ADMIN — IOPAMIDOL 75 ML: 755 INJECTION, SOLUTION INTRAVENOUS at 19:22

## 2025-04-27 NOTE — DISCHARGE INSTRUCTIONS
If your symptoms worsen or new concerning symptoms present, return to the emergency department for further evaluation.  If you are unable to get into see GI, follow-up with your primary care physician.

## 2025-05-02 NOTE — ED PROVIDER NOTES
[facility] EMERGENCY DEPARTMENT  EMERGENCY DEPARTMENT ENCOUNTER        Pt Name: Dick Gonzalez  MRN: [id number]  Birthdate 1945  Date of evaluation: 4/26/2025  Provider: Anny Martinez PA-C  PCP: Azael Marie MD  Note Started: 6:56 PM EDT 5/2/25      MARLEY. I have evaluated this patient.        CHIEF COMPLAINT       Chief Complaint   Patient presents with    Rectal Bleeding     Pt had 2 bloody bowl movement today one in the morning and one at night. No know hx or cause.        HISTORY OF PRESENT ILLNESS: 1 or more Elements     History From:   Patient          Chief Complaint: Rectal bleeding    Dick Gonzalez is a 79 y.o. male who presents to the emergency department with complaint of rectal bleeding after having 2 bloody bowel movements today.  He states 1 was earlier in the morning and 1 this afternoon.  He states that he did pass a clot later this afternoon and brought it in for further evaluation.  He denies any abdominal pain.  Denies any dysuria, hematuria.  He denies this happening in the past.  He does not feel lightheaded or dizzy.  He does have a history of prostate cancer where he had seeds and radiation    Nursing Notes were all reviewed and agreed with or any disagreements were addressed in the HPI.    REVIEW OF SYSTEMS :      Review of Systems   All other systems reviewed and are negative.      Positives and Pertinent negatives as per HPI.     SURGICAL HISTORY     Past Surgical History:   Procedure Laterality Date    COLONOSCOPY  9/26/2001    negative, david, also neg sigmoid 2007    COLONOSCOPY  2/15/2011    negative dr hernandez    COLONOSCOPY  08/23/2021    dr sumner, polyps, repeat in 5 years    KIDNEY STONE SURGERY         CURRENTMEDICATIONS       Discharge Medication List as of 4/26/2025  9:50 PM        CONTINUE these medications which have NOT CHANGED    Details   diclofenac sodium (VOLTAREN) 1 % GEL Apply 2 g topically 4 times daily as needed for Pain, Topical, 4 TIMES DAILY PRN,

## 2025-05-09 ENCOUNTER — OFFICE VISIT (OUTPATIENT)
Dept: VASCULAR SURGERY | Age: 80
End: 2025-05-09

## 2025-05-09 VITALS
HEART RATE: 87 BPM | WEIGHT: 175.6 LBS | OXYGEN SATURATION: 95 % | SYSTOLIC BLOOD PRESSURE: 130 MMHG | HEIGHT: 66 IN | DIASTOLIC BLOOD PRESSURE: 69 MMHG | BODY MASS INDEX: 28.22 KG/M2

## 2025-05-09 DIAGNOSIS — H93.11 TINNITUS AURIUM, RIGHT: ICD-10-CM

## 2025-05-09 DIAGNOSIS — H66.001 NON-RECURRENT ACUTE SUPPURATIVE OTITIS MEDIA OF RIGHT EAR WITHOUT SPONTANEOUS RUPTURE OF TYMPANIC MEMBRANE: Primary | ICD-10-CM

## 2025-05-09 DIAGNOSIS — I65.23 ASYMPTOMATIC CAROTID ARTERY STENOSIS WITHOUT INFARCTION, BILATERAL: ICD-10-CM

## 2025-05-09 DIAGNOSIS — R55 SYNCOPE AND COLLAPSE: ICD-10-CM

## 2025-05-09 ASSESSMENT — ENCOUNTER SYMPTOMS
ALLERGIC/IMMUNOLOGIC NEGATIVE: 1
RESPIRATORY NEGATIVE: 1
EYES NEGATIVE: 1
GASTROINTESTINAL NEGATIVE: 1

## 2025-05-09 NOTE — PROGRESS NOTES
Prox 107.6 cm/s       18.1 cm/s       125.8 cm/s       27.2 cm/s         CCA Mid 105.8 cm/s       23.6 cm/s       89.3 cm/s       23.6 cm/s         CCA Dist 103.9 cm/s       23.5 cm/s       87.4 cm/s       19.9 cm/s         ICA Prox 120.2 cm/s       18.1 cm/s       103.8 cm/s       32.7 cm/s         ICA Mid 58.2 cm/s       21.7 cm/s       85.6 cm/s       19.9 cm/s         ICA Dist 81.9 cm/s       27.2 cm/s       96.5 cm/s       30.8 cm/s          cm/s       21.7 cm/s       142.1 cm/s       25.4 cm/s         Vertebral 37.4 cm/s       13 cm/s       65.8 cm/s       17.5 cm/s             Assessment & Plan   ASSESSMENT/PLAN:  1. Non-recurrent acute suppurative otitis media of right ear without spontaneous rupture of tympanic membrane  -     Steve Whitaker MD, Otolaryngology, [facility]  2. Syncope and collapse  -     Steve Whitaker MD, Otolaryngology, [facility]  -     Vascular duplex carotid bilateral; Future  3. Tinnitus aurium, right  -     Steve Whitaker MD, Otolaryngology, [facility]  4. Asymptomatic carotid artery stenosis without infarction, bilateral  -     Vascular duplex carotid bilateral; Future  79-year-old male presenting for follow-up of asymptomatic bilateral carotid stenosis.    Patient continues to have balance issues as well as tinnitus and hearing problems.  He would like to see an ENT.  I will make referral to either Dr. Steve Perez or Dr. Colten Sumner with ENT for evaluation.  Patient will be due for repeat carotid surveillance in 6 months for reevaluation of his stenosis.  Patient is to continue his Xarelto and statin.  I discussed he should start taking aspirin though he had recent GI bleed which she is still recovering from.  I recommend he hold aspirin at this time and restart once his stool has cleared of any blood.  I will plan to see him back at that time.  Patient voiced understanding is agreement this plan.    Return in about 6 months

## 2025-05-13 DIAGNOSIS — H91.90 HEARING LOSS, UNSPECIFIED HEARING LOSS TYPE, UNSPECIFIED LATERALITY: Primary | ICD-10-CM

## 2025-05-17 SDOH — HEALTH STABILITY: PHYSICAL HEALTH: ON AVERAGE, HOW MANY MINUTES DO YOU ENGAGE IN EXERCISE AT THIS LEVEL?: 20 MIN

## 2025-05-17 SDOH — HEALTH STABILITY: PHYSICAL HEALTH: ON AVERAGE, HOW MANY DAYS PER WEEK DO YOU ENGAGE IN MODERATE TO STRENUOUS EXERCISE (LIKE A BRISK WALK)?: 4 DAYS

## 2025-05-17 ASSESSMENT — LIFESTYLE VARIABLES
HOW OFTEN DO YOU HAVE SIX OR MORE DRINKS ON ONE OCCASION: 1
HOW MANY STANDARD DRINKS CONTAINING ALCOHOL DO YOU HAVE ON A TYPICAL DAY: 0
HOW MANY STANDARD DRINKS CONTAINING ALCOHOL DO YOU HAVE ON A TYPICAL DAY: PATIENT DOES NOT DRINK
HOW OFTEN DO YOU HAVE A DRINK CONTAINING ALCOHOL: NEVER
HOW OFTEN DO YOU HAVE A DRINK CONTAINING ALCOHOL: 1

## 2025-05-17 ASSESSMENT — PATIENT HEALTH QUESTIONNAIRE - PHQ9
SUM OF ALL RESPONSES TO PHQ QUESTIONS 1-9: 0
SUM OF ALL RESPONSES TO PHQ QUESTIONS 1-9: 0
1. LITTLE INTEREST OR PLEASURE IN DOING THINGS: NOT AT ALL
SUM OF ALL RESPONSES TO PHQ QUESTIONS 1-9: 0
2. FEELING DOWN, DEPRESSED OR HOPELESS: NOT AT ALL
SUM OF ALL RESPONSES TO PHQ QUESTIONS 1-9: 0

## 2025-05-19 NOTE — PROGRESS NOTES
Physician Progress Note      PATIENT:               CLAUDIA HUGHES  CSN #:                  [id number]  :                       1945  ADMIT DATE:       2025 9:30 AM  DISCH DATE:        2025 4:50 PM  RESPONDING  PROVIDER #:        Kristel Broderick MD          QUERY TEXT:    TIA is documented in the  DCS. Please further specify the etiology of the   TIA:    The clinical indicators include:   DCS- CT head negative for acute intracranial abnormality. CTA head and   neck showed bilateral carotid stenosis, worse on the left.  MRI brain   completed negative for any acute stroke.   Seen by vascular surgery, no   indication for acute vascular surgery at this point.  Strokelike symptoms, likely TIA.  Acute ischemic stroke ruled out.  Bilateral carotid disease, patient takes Xarelto, started on aspirin.  Carotid   Duplex to be completed outpatient.  Follow-up with vascular surgery in 2   weeks.  History of A-fib, continue Xarelto  Hyperlipidemia  Options provided:  -- TIA likely related to bilateral carotid stenosis without infarction  -- Other - I will add my own diagnosis  -- Disagree - Not applicable / Not valid  -- Disagree - Clinically unable to determine / Unknown  -- Refer to Clinical Documentation Reviewer    PROVIDER RESPONSE TEXT:    TIA likely related to bilateral carotid stenosis without infarction    Query created by: Paula Carranza on 2025 3:15 PM      Electronically signed by:  Kristel Broderick MD 2025 4:03 PM

## 2025-05-20 ENCOUNTER — OFFICE VISIT (OUTPATIENT)
Dept: FAMILY MEDICINE CLINIC | Age: 80
End: 2025-05-20
Payer: MEDICARE

## 2025-05-20 VITALS
HEART RATE: 72 BPM | BODY MASS INDEX: 27.93 KG/M2 | HEIGHT: 66 IN | TEMPERATURE: 97 F | SYSTOLIC BLOOD PRESSURE: 118 MMHG | DIASTOLIC BLOOD PRESSURE: 78 MMHG | WEIGHT: 173.8 LBS

## 2025-05-20 DIAGNOSIS — I48.91 ATRIAL FIBRILLATION, UNSPECIFIED TYPE (HCC): ICD-10-CM

## 2025-05-20 DIAGNOSIS — Z00.00 MEDICARE ANNUAL WELLNESS VISIT, SUBSEQUENT: Primary | ICD-10-CM

## 2025-05-20 DIAGNOSIS — G45.9 TIA (TRANSIENT ISCHEMIC ATTACK): ICD-10-CM

## 2025-05-20 DIAGNOSIS — R26.9 GAIT DIFFICULTY: ICD-10-CM

## 2025-05-20 DIAGNOSIS — E78.2 MIXED HYPERLIPIDEMIA: ICD-10-CM

## 2025-05-20 PROCEDURE — 1111F DSCHRG MED/CURRENT MED MERGE: CPT | Performed by: FAMILY MEDICINE

## 2025-05-20 PROCEDURE — G8417 CALC BMI ABV UP PARAM F/U: HCPCS | Performed by: FAMILY MEDICINE

## 2025-05-20 PROCEDURE — G0439 PPPS, SUBSEQ VISIT: HCPCS | Performed by: FAMILY MEDICINE

## 2025-05-20 PROCEDURE — 1160F RVW MEDS BY RX/DR IN RCRD: CPT | Performed by: FAMILY MEDICINE

## 2025-05-20 PROCEDURE — 1036F TOBACCO NON-USER: CPT | Performed by: FAMILY MEDICINE

## 2025-05-20 PROCEDURE — G8427 DOCREV CUR MEDS BY ELIG CLIN: HCPCS | Performed by: FAMILY MEDICINE

## 2025-05-20 PROCEDURE — 1123F ACP DISCUSS/DSCN MKR DOCD: CPT | Performed by: FAMILY MEDICINE

## 2025-05-20 PROCEDURE — 99214 OFFICE O/P EST MOD 30 MIN: CPT | Performed by: FAMILY MEDICINE

## 2025-05-20 PROCEDURE — 1159F MED LIST DOCD IN RCRD: CPT | Performed by: FAMILY MEDICINE

## 2025-05-20 RX ORDER — ATORVASTATIN CALCIUM 40 MG/1
40 TABLET, FILM COATED ORAL DAILY
Qty: 30 TABLET | Refills: 3 | Status: SHIPPED | OUTPATIENT
Start: 2025-05-20 | End: 2025-05-22

## 2025-05-20 NOTE — PATIENT INSTRUCTIONS
Do see dr león for the dry mouth and the dizzy feeling     Stop the fish oil as it will contribute to bleeding   Avoid vitamin E   Stop the diclofenac gel     Ok to use over the counter sport rub like [place] Cadena or Icy hot   Use the tylenol for joint pain     Take vitamin D 1000 units a day  Change cholesterol medicine   Change to lipitor   Follow up with the new doctor here in about 2 months     Ok to start the 81 mg aspirin daily          Preventing Falls: Care Instructions  Injuries and health problems such as trouble walking or poor eyesight can increase your risk of falling. So can some medicines. But there are things you can do to help prevent falls. You can exercise to get stronger. You can also arrange your home to make it safer.    Talk to your doctor about the medicines you take. Ask if any of them increase the risk of falls and whether they can be changed or stopped.   Try to exercise regularly. It can help improve your strength and balance. This can help lower your risk of falling.         Practice fall safety and prevention.   Wear low-heeled shoes that fit well and give your feet good support. Talk to your doctor if you have foot problems that make this hard.  Carry a cellphone or wear a medical alert device that you can use to call for help.  Use stepladders instead of chairs to reach high objects. Don't climb if you're at risk for falls. Ask for help, if needed.  Wear the correct eyeglasses, if you need them.        Make your home safer.   Remove rugs, cords, clutter, and furniture from walkways.  Keep your house well lit. Use night-lights in hallways and bathrooms.  Install and use sturdy handrails on stairways.  Wear nonskid footwear, even inside. Don't walk barefoot or in socks without shoes.        Be safe outside.   Use handrails, curb cuts, and ramps whenever possible.  Keep your hands free by using a shoulder bag or backpack.  Try to walk in well-lit areas. Watch out for uneven ground, changes

## 2025-05-20 NOTE — PROGRESS NOTES
Subjective:      Patient ID: Dick Gonzalez is a 79 y.o. male.    Chief Complaint   Patient presents with    6 Month Follow-Up     lipids        Patient presents with:  6 Month Follow-Up: lipids    He is well   Blood scant on the tissue after bm but patient clearly states he has improved    No abd pain   Bm look ok   Urine is passing ok no sx no pain no blood     No cp no sob   No ha  No fever     Here with the wife     YOB: 1945    Date of Visit:  5/20/2025    No Known Allergies    Current Outpatient Medications:  diclofenac sodium (VOLTAREN) 1 % GEL, Apply 2 g topically 4 times daily as needed for Pain, Disp: , Rfl:   aspirin 81 MG chewable tablet, Take 1 tablet by mouth daily, Disp: 30 tablet, Rfl: 0  pravastatin (PRAVACHOL) 40 MG tablet, TAKE 1 TABLET BY MOUTH EVERY EVENING, Disp: 90 tablet, Rfl: 2  nystatin (MYCOSTATIN) 610843 UNIT/GM cream, Apply topically daily Apply topically 2 times daily., Disp: , Rfl:   Omega 3 1000 MG CAPS, Take 1,000 mg by mouth daily, Disp: , Rfl:   XARELTO 20 MG TABS tablet, TAKE 1 TABLET BY MOUTH DAILY WITH BREAKFAST, Disp: 90 tablet, Rfl: 3  metoprolol succinate (TOPROL XL) 50 MG extended release tablet, TAKE 1 TABLET BY MOUTH DAILY, Disp: 90 tablet, Rfl: 2  sildenafil (REVATIO) 20 MG tablet, Take 1 tablet by mouth as needed, Disp: , Rfl:   mometasone (ELOCON) 0.1 % cream, Apply topically daily., Disp: 60 g, Rfl: 0  clotrimazole (LOTRIMIN) 1 % cream, Apply topically 2 times daily Apply topically 2 times daily. For 2 week, Disp: 45 g, Rfl: 0  Acetaminophen 650 MG TABS, Take by mouth 2 times daily , Disp: , Rfl:   Multiple Vitamins-Minerals (MADHU MULTIVITAMIN FOR MEN PO), Take  by mouth. For memory takes 1 tablet, Disp: , Rfl:     No current facility-administered medications for this visit.      ----------------------------------                   05/20/25                             1340            ----------------------------------   BP:

## 2025-05-20 NOTE — PROGRESS NOTES
Medicare Annual Wellness Visit    Dick Gonzalez is here for 6 Month Follow-Up (lipids) and Medicare AWV    Assessment & Plan   TIA (transient ischemic attack)  Gait difficulty  Atrial fibrillation, unspecified type (HCC)  Mixed hyperlipidemia  Medicare annual wellness visit, subsequent       Return in about 8 weeks (around 7/14/2025).     Subjective   Medicare AWV    Patient's complete Health Risk Assessment and screening values have been reviewed and are found in Flowsheets. The following problems were reviewed today and where indicated follow up appointments were made and/or referrals ordered.    Positive Risk Factor Screenings with Interventions:    Fall Risk:  Do you feel unsteady or are you worried about falling? :  (Patient uses a hiking stick to ambulate with)  2 or more falls in past year?: (!) (Patient-Rptd) yes  Fall with injury in past year?: (Patient-Rptd) no     Interventions:    Reviewed medications, home hazards, visual acuity, and co-morbidities that can increase risk for falls  See AVS for additional education material                                   Objective   Vitals:    05/20/25 1340   BP: 118/78   BP Site: Left Upper Arm   Patient Position: Sitting   BP Cuff Size: Medium Adult   Pulse: 72   Temp: 97 °F (36.1 °C)   TempSrc: Temporal   Weight: 78.8 kg (173 lb 12.8 oz)   Height: 1.664 m (5' 5.5\")      Body mass index is 28.48 kg/m².                  No Known Allergies  Prior to Visit Medications    Medication Sig Taking? Authorizing Provider   atorvastatin (LIPITOR) 40 MG tablet Take 1 tablet by mouth daily Yes Azael Marie MD   aspirin 81 MG chewable tablet Take 1 tablet by mouth daily Yes Kristel Broderick MD   nystatin (MYCOSTATIN) 775507 UNIT/GM cream Apply topically daily Apply topically 2 times daily. Yes ProviderSebas MD   XARELTO 20 MG TABS tablet TAKE 1 TABLET BY MOUTH DAILY WITH BREAKFAST Yes Luciano Heller MD   metoprolol succinate (TOPROL XL) 50 MG extended release

## 2025-05-22 RX ORDER — ATORVASTATIN CALCIUM 40 MG/1
40 TABLET, FILM COATED ORAL DAILY
Qty: 90 TABLET | Refills: 0 | Status: SHIPPED | OUTPATIENT
Start: 2025-05-22

## 2025-06-05 RX ORDER — METOPROLOL SUCCINATE 50 MG/1
50 TABLET, EXTENDED RELEASE ORAL DAILY
Qty: 90 TABLET | Refills: 2 | Status: SHIPPED | OUTPATIENT
Start: 2025-06-05

## 2025-06-06 NOTE — PROGRESS NOTES
Dick Gonzalez   1945, 79 y.o. male   [id number]       Referring Provider: Mateo Sumner MD  Referral Type: In an order in Epic    Reason for Visit: Evaluation of suspected change in hearing, tinnitus, or balance.    ADULT AUDIOLOGIC EVALUATION      Dick Gonzalez is a 79 y.o. male seen today, 6/9/2025 , for an initial audiologic evaluation.  Patient was seen by Mateo Sumner MD following today's evaluation.    AUDIOLOGIC AND OTHER PERTINENT MEDICAL HISTORY:      Dick Gonzalez reports a hissing/static tinnitus bilaterally. Patient has a history of loud noise exposure and a family history of hearing loss. He reports that both ears \"feel like there's cotton balls in them\".     He has experienced previous spells of dizziness and imbalance lasting several minutes in duration. He denied any recent dizzy spells.     He denied otalgia, otorrhea, history of falls, history of head trauma, and history of ear surgery    Date: 6/9/2025     IMPRESSIONS:      Today's results revealed a bilateral sensorineural hearing loss    Good speech understanding when in quiet. Tympanometry indicates Right Ear normal middle ear function and Left Ear normal middle ear function.    Discussed test results and implications with patient. Discussed possible benefits of amplification.  Discussed scheduling a HAE. Discussed use of tinnitus management strategies. Hearing aids recommended at this time. Discussed noise exposure and the importance of appropriate hearing protection when in hazardous noise environments.    Follow medical recommendations of Mateo Sumner MD.    ASSESSMENT AND FINDINGS:     Otoscopy unremarkable.    RIGHT EAR:  Hearing Sensitivity: Normal sloping to Severe Sensorineural hearing loss  Speech Recognition Threshold: 35 dB HL  Word Recognition: Good 80%, based on NU-6 by-difficulty list at 75 dBHL with 45 dB HL masking noise using recorded speech stimuli.    Tympanometry: Normal peak pressure with low compliance, Type As

## 2025-06-09 ENCOUNTER — PROCEDURE VISIT (OUTPATIENT)
Dept: AUDIOLOGY | Age: 80
End: 2025-06-09
Payer: MEDICARE

## 2025-06-09 ENCOUNTER — OFFICE VISIT (OUTPATIENT)
Dept: ENT CLINIC | Age: 80
End: 2025-06-09

## 2025-06-09 VITALS
HEIGHT: 66 IN | BODY MASS INDEX: 27.97 KG/M2 | HEART RATE: 82 BPM | DIASTOLIC BLOOD PRESSURE: 69 MMHG | WEIGHT: 174 LBS | SYSTOLIC BLOOD PRESSURE: 125 MMHG

## 2025-06-09 DIAGNOSIS — R68.2 DRY MOUTH: ICD-10-CM

## 2025-06-09 DIAGNOSIS — H93.13 TINNITUS OF BOTH EARS: ICD-10-CM

## 2025-06-09 DIAGNOSIS — R42 DIZZINESS: ICD-10-CM

## 2025-06-09 DIAGNOSIS — H90.3 SENSORINEURAL HEARING LOSS (SNHL) OF BOTH EARS: Primary | ICD-10-CM

## 2025-06-09 DIAGNOSIS — L98.9 SCALP LESION: ICD-10-CM

## 2025-06-09 PROCEDURE — 92567 TYMPANOMETRY: CPT

## 2025-06-09 PROCEDURE — 92557 COMPREHENSIVE HEARING TEST: CPT

## 2025-06-09 NOTE — PROGRESS NOTES
had an audiogram today shows normal sloping to severe sensorineural hearing loss        ASSESSMENT/PLAN  1. Sensorineural hearing loss (SNHL) of both ears  The patient does have hearing loss.  This is probably presbycusis and noise exposure.  I do not think it has anything to with his dizziness.  There is some data suggest that hearing is will help with vague dizziness.  He would also benefit from hearing aids from a hearing standpoint.    2. Dizziness  I do not think this is a primary ear problem.  Recommend he continue to work with physical therapy.    3. Scalp lesion  I do agree this is probably a skin cyst.  I have recommended surgical excision in the clinic.  He is on blood thinners, but I do not think we need to stop these for removal.  We discussed the risk including bleeding and infection.  He is agreed to proceed.  We will schedule him to do this in clinic.    4. Dry mouth  He does have dry mucous membranes without an obvious explanation.  He is not really on medications that I would typically think would cause significant dry mouth.  I told him to increase his water intake.  Him also going get a few rheumatologic labs just to rule out something like Sjogren's, but this is fairly unlikely.  - Anti SSA; Future  - Anti SSB; Future  - LUCRECIA Reflex to Antibody Cascade; Future             I have performed a head and neck physical exam personally or was physically present during the key or critical portions of the service.    This note was generated completely or in part utilizing Dragon dictation speech recognition software.  Occasionally, words are mistranscribed and despite editing, the text may contain inaccuracies due to incorrect word recognition.  If further clarification is needed please contact the office at [phone].

## 2025-06-10 LAB
ANA SER QL IA: POSITIVE
CENTROMERE B IGG SER QL LINE BLOT: <0.2 AI (ref 0–0.9)
CHROMATIN AB SERPL-ACNC: 0.3 AI (ref 0–0.9)
DSDNA AB SER-ACNC: <1 IU/ML (ref 0–9)
ENA JO1 AB SER IA-ACNC: <0.2 AI (ref 0–0.9)
ENA RNP AB SER IA-ACNC: 0.6 AI (ref 0–0.9)
ENA SCL70 IGG SER IA-ACNC: <0.2 AI (ref 0–0.9)
ENA SM AB SER IA-ACNC: <0.2 AI (ref 0–0.9)
ENA SM+RNP IGG SER-ACNC: <0.2 AI (ref 0–0.9)
ENA SS-A AB SER IA-ACNC: >8 AI (ref 0–0.9)
ENA SS-B AB SER IA-ACNC: <0.2 AI (ref 0–0.9)
RIBOSOMAL P AB SER IA-ACNC: <0.2 AI (ref 0–0.9)

## 2025-06-12 ENCOUNTER — TELEPHONE (OUTPATIENT)
Dept: ENT CLINIC | Age: 80
End: 2025-06-12

## 2025-06-12 DIAGNOSIS — R68.2 DRY MOUTH: Primary | ICD-10-CM

## 2025-06-13 LAB
ANA PATTERN: ABNORMAL
ANA TITER: ABNORMAL
ANTINUCLEAR AB INTERPRETIVE COMMENT: ABNORMAL
NUCLEAR IGG SER QL IF: DETECTED

## 2025-07-14 PROBLEM — H66.001 NON-RECURRENT ACUTE SUPPURATIVE OTITIS MEDIA OF RIGHT EAR WITHOUT SPONTANEOUS RUPTURE OF TYMPANIC MEMBRANE: Status: RESOLVED | Noted: 2024-07-05 | Resolved: 2025-07-14

## 2025-07-14 PROBLEM — R55 SYNCOPE AND COLLAPSE: Status: RESOLVED | Noted: 2020-03-04 | Resolved: 2025-07-14

## 2025-07-14 PROBLEM — G45.9 TIA (TRANSIENT ISCHEMIC ATTACK): Status: ACTIVE | Noted: 2025-04-23

## 2025-07-14 NOTE — PROGRESS NOTES
Dick Gonzalez (:  1945) is a 80 y.o. male,Established patient, here for evaluation of the following chief complaint(s):  Established New Doctor (Former Dr. Marie Patient /) and Check-Up (Lipids - pt is fasting)      Assessment & Plan  Mixed hyperlipidemia   Chronic, at goal (stable), continue current treatment plan         TIA (transient ischemic attack)   Chronic, at goal (stable), changes made today: Hold aspirin and Xarelto giving rectal bleeding         Atrial fibrillation, unspecified type (HCC)   Chronic, at goal (stable), changes made today: Hold Xarelto given rectal bleeding         Typical atrial flutter (HCC)   Chronic, at goal (stable), changes made today: Hold Xarelto given rectal bleeding         Rectal bleeding   Acute condition, new, Obtain labs per orders.  Hold aspirin and Xarelto.  If symptoms worsen, need to go to the emergency room for evaluation.    Orders:    CBC with Auto Differential; Future    Comprehensive Metabolic Panel; Future    Iron and TIBC; Future      No follow-ups on file.    Subjective       HPI    Atrial fib/flutter-patient follows with Dr. Heller.  Patient is on metoprolol and Xarelto.    History of TIA-patient is on statin and Xarelto. Bleeding issue since started ASA.    Went to the emergency room in April for rectal bleeding and still having rectal bleeding daily. Occurs when having a BM.  Having 3-4 episodes a day. increased fatigue. Increased bowel movement with gross blood. No pain.  No bleeding.  Had CyberKnife of prostate in 2024 per Dr. Rocha    Dry mouth. No dry eyes. Had blood work done.  And showed positive LUCRECIA with speckled pattern ear nose and throat, recommended patient to see rheumatology.    Last blood work-CMP, CBC, lipid panel on 2025.  PSA on 2/10/2025      Review of Systems   Constitutional:  Positive for fatigue. Negative for chills and fever.   Respiratory:  Negative for cough, shortness of breath and wheezing.    Cardiovascular:

## 2025-07-14 NOTE — ASSESSMENT & PLAN NOTE
Chronic, at goal (stable), changes made today: Hold aspirin and Xarelto giving rectal bleeding

## 2025-07-15 ENCOUNTER — OFFICE VISIT (OUTPATIENT)
Dept: FAMILY MEDICINE CLINIC | Age: 80
End: 2025-07-15
Payer: MEDICARE

## 2025-07-15 VITALS
OXYGEN SATURATION: 100 % | HEIGHT: 66 IN | TEMPERATURE: 97.3 F | HEART RATE: 84 BPM | BODY MASS INDEX: 27.29 KG/M2 | DIASTOLIC BLOOD PRESSURE: 78 MMHG | WEIGHT: 169.8 LBS | SYSTOLIC BLOOD PRESSURE: 116 MMHG

## 2025-07-15 DIAGNOSIS — I48.3 TYPICAL ATRIAL FLUTTER (HCC): ICD-10-CM

## 2025-07-15 DIAGNOSIS — K62.5 RECTAL BLEEDING: ICD-10-CM

## 2025-07-15 DIAGNOSIS — I48.91 ATRIAL FIBRILLATION, UNSPECIFIED TYPE (HCC): ICD-10-CM

## 2025-07-15 DIAGNOSIS — G45.9 TIA (TRANSIENT ISCHEMIC ATTACK): ICD-10-CM

## 2025-07-15 DIAGNOSIS — E78.2 MIXED HYPERLIPIDEMIA: Primary | ICD-10-CM

## 2025-07-15 PROCEDURE — 99214 OFFICE O/P EST MOD 30 MIN: CPT | Performed by: INTERNAL MEDICINE

## 2025-07-15 PROCEDURE — 1036F TOBACCO NON-USER: CPT | Performed by: INTERNAL MEDICINE

## 2025-07-15 PROCEDURE — G8427 DOCREV CUR MEDS BY ELIG CLIN: HCPCS | Performed by: INTERNAL MEDICINE

## 2025-07-15 PROCEDURE — 1123F ACP DISCUSS/DSCN MKR DOCD: CPT | Performed by: INTERNAL MEDICINE

## 2025-07-15 PROCEDURE — 1160F RVW MEDS BY RX/DR IN RCRD: CPT | Performed by: INTERNAL MEDICINE

## 2025-07-15 PROCEDURE — 1159F MED LIST DOCD IN RCRD: CPT | Performed by: INTERNAL MEDICINE

## 2025-07-15 PROCEDURE — G8417 CALC BMI ABV UP PARAM F/U: HCPCS | Performed by: INTERNAL MEDICINE

## 2025-07-15 RX ORDER — PENTOXIFYLLINE 400 MG/1
400 TABLET, EXTENDED RELEASE ORAL
COMMUNITY
Start: 2025-06-17

## 2025-07-15 ASSESSMENT — ENCOUNTER SYMPTOMS
NAUSEA: 0
ANAL BLEEDING: 1
VOMITING: 0
DIARRHEA: 0
SHORTNESS OF BREATH: 0
CONSTIPATION: 0
WHEEZING: 0
BLOOD IN STOOL: 1
ABDOMINAL PAIN: 0
COUGH: 0

## 2025-07-16 ENCOUNTER — HOSPITAL ENCOUNTER (INPATIENT)
Age: 80
LOS: 2 days | Discharge: NURSING, REHAB OR LONG-TERM CARE | End: 2025-07-18
Attending: STUDENT IN AN ORGANIZED HEALTH CARE EDUCATION/TRAINING PROGRAM | Admitting: HOSPITALIST
Payer: MEDICARE

## 2025-07-16 ENCOUNTER — APPOINTMENT (OUTPATIENT)
Dept: CT IMAGING | Age: 80
End: 2025-07-16
Payer: MEDICARE

## 2025-07-16 DIAGNOSIS — D64.9 ANEMIA, UNSPECIFIED TYPE: ICD-10-CM

## 2025-07-16 DIAGNOSIS — K62.5 RECTAL BLEEDING: Primary | ICD-10-CM

## 2025-07-16 PROBLEM — K92.2 GI BLEED: Status: ACTIVE | Noted: 2025-07-16

## 2025-07-16 LAB
ABO/RH: NORMAL
ALBUMIN SERPL-MCNC: 3.8 G/DL (ref 3.4–5)
ALBUMIN SERPL-MCNC: 4.1 G/DL (ref 3.4–5)
ALBUMIN/GLOB SERPL: 1.9 {RATIO} (ref 1.1–2.2)
ALBUMIN/GLOB SERPL: 2.3 {RATIO} (ref 1.1–2.2)
ALP SERPL-CCNC: 55 U/L (ref 40–129)
ALP SERPL-CCNC: 60 U/L (ref 40–129)
ALT SERPL-CCNC: 26 U/L (ref 10–40)
ALT SERPL-CCNC: 29 U/L (ref 10–40)
ANION GAP SERPL CALCULATED.3IONS-SCNC: 10 MMOL/L (ref 3–16)
ANION GAP SERPL CALCULATED.3IONS-SCNC: 9 MMOL/L (ref 3–16)
ANTIBODY SCREEN: NORMAL
AST SERPL-CCNC: 30 U/L (ref 15–37)
AST SERPL-CCNC: 31 U/L (ref 15–37)
BASOPHILS # BLD: 0 K/UL (ref 0–0.2)
BASOPHILS # BLD: 0 K/UL (ref 0–0.2)
BASOPHILS NFR BLD: 0.6 %
BASOPHILS NFR BLD: 1 %
BILIRUB DIRECT SERPL-MCNC: 0.2 MG/DL (ref 0–0.3)
BILIRUB INDIRECT SERPL-MCNC: 0.2 MG/DL (ref 0–1)
BILIRUB SERPL-MCNC: 0.3 MG/DL (ref 0–1)
BILIRUB SERPL-MCNC: 0.4 MG/DL (ref 0–1)
BILIRUB UR QL STRIP.AUTO: NEGATIVE
BLOOD BANK DISPENSE STATUS: NORMAL
BLOOD BANK PRODUCT CODE: NORMAL
BPU ID: NORMAL
BUN SERPL-MCNC: 14 MG/DL (ref 7–20)
BUN SERPL-MCNC: 19 MG/DL (ref 7–20)
CALCIUM SERPL-MCNC: 8.7 MG/DL (ref 8.3–10.6)
CALCIUM SERPL-MCNC: 9 MG/DL (ref 8.3–10.6)
CHLORIDE SERPL-SCNC: 107 MMOL/L (ref 99–110)
CHLORIDE SERPL-SCNC: 109 MMOL/L (ref 99–110)
CLARITY UR: CLEAR
CO2 SERPL-SCNC: 22 MMOL/L (ref 21–32)
CO2 SERPL-SCNC: 24 MMOL/L (ref 21–32)
COLOR UR: YELLOW
CREAT SERPL-MCNC: 0.9 MG/DL (ref 0.8–1.3)
CREAT SERPL-MCNC: 1 MG/DL (ref 0.8–1.3)
DEPRECATED RDW RBC AUTO: 14 % (ref 12.4–15.4)
DEPRECATED RDW RBC AUTO: 14.1 % (ref 12.4–15.4)
DESCRIPTION BLOOD BANK: NORMAL
EOSINOPHIL # BLD: 0.1 K/UL (ref 0–0.6)
EOSINOPHIL # BLD: 0.2 K/UL (ref 0–0.6)
EOSINOPHIL NFR BLD: 2.8 %
EOSINOPHIL NFR BLD: 5.5 %
GFR SERPLBLD CREATININE-BSD FMLA CKD-EPI: 77 ML/MIN/{1.73_M2}
GFR SERPLBLD CREATININE-BSD FMLA CKD-EPI: 87 ML/MIN/{1.73_M2}
GLUCOSE SERPL-MCNC: 104 MG/DL (ref 70–99)
GLUCOSE SERPL-MCNC: 99 MG/DL (ref 70–99)
GLUCOSE UR STRIP.AUTO-MCNC: NEGATIVE MG/DL
HCT VFR BLD AUTO: 19.8 % (ref 40.5–52.5)
HCT VFR BLD AUTO: 23.3 % (ref 40.5–52.5)
HGB BLD-MCNC: 6.6 G/DL (ref 13.5–17.5)
HGB BLD-MCNC: 7.8 G/DL (ref 13.5–17.5)
HGB UR QL STRIP.AUTO: NEGATIVE
INR PPP: 1.06 (ref 0.86–1.14)
IRON SATN MFR SERPL: 5 % (ref 20–50)
IRON SERPL-MCNC: 16 UG/DL (ref 59–158)
KETONES UR STRIP.AUTO-MCNC: NEGATIVE MG/DL
LACTATE BLDV-SCNC: 0.9 MMOL/L (ref 0.4–1.9)
LEUKOCYTE ESTERASE UR QL STRIP.AUTO: NEGATIVE
LIPASE SERPL-CCNC: 51 U/L (ref 13–60)
LYMPHOCYTES # BLD: 0.9 K/UL (ref 1–5.1)
LYMPHOCYTES # BLD: 1 K/UL (ref 1–5.1)
LYMPHOCYTES NFR BLD: 23.9 %
LYMPHOCYTES NFR BLD: 27.9 %
MAGNESIUM SERPL-MCNC: 2.04 MG/DL (ref 1.8–2.4)
MCH RBC QN AUTO: 30.8 PG (ref 26–34)
MCH RBC QN AUTO: 31.1 PG (ref 26–34)
MCHC RBC AUTO-ENTMCNC: 33.4 G/DL (ref 31–36)
MCHC RBC AUTO-ENTMCNC: 33.5 G/DL (ref 31–36)
MCV RBC AUTO: 92.2 FL (ref 80–100)
MCV RBC AUTO: 92.9 FL (ref 80–100)
MONOCYTES # BLD: 0.5 K/UL (ref 0–1.3)
MONOCYTES # BLD: 0.5 K/UL (ref 0–1.3)
MONOCYTES NFR BLD: 13 %
MONOCYTES NFR BLD: 13.9 %
NEUTROPHILS # BLD: 1.9 K/UL (ref 1.7–7.7)
NEUTROPHILS # BLD: 2.3 K/UL (ref 1.7–7.7)
NEUTROPHILS NFR BLD: 52.1 %
NEUTROPHILS NFR BLD: 59.3 %
NITRITE UR QL STRIP.AUTO: NEGATIVE
NT-PROBNP SERPL-MCNC: 1539 PG/ML (ref 0–449)
PH UR STRIP.AUTO: 7 [PH] (ref 5–8)
PLATELET # BLD AUTO: 212 K/UL (ref 135–450)
PLATELET # BLD AUTO: 249 K/UL (ref 135–450)
PMV BLD AUTO: 7 FL (ref 5–10.5)
PMV BLD AUTO: 7.9 FL (ref 5–10.5)
POTASSIUM SERPL-SCNC: 4 MMOL/L (ref 3.5–5.1)
POTASSIUM SERPL-SCNC: 4.6 MMOL/L (ref 3.5–5.1)
PROT SERPL-MCNC: 5.8 G/DL (ref 6.4–8.2)
PROT SERPL-MCNC: 5.9 G/DL (ref 6.4–8.2)
PROT UR STRIP.AUTO-MCNC: NEGATIVE MG/DL
PROTHROMBIN TIME: 14.1 SEC (ref 12.1–14.9)
RBC # BLD AUTO: 2.15 M/UL (ref 4.2–5.9)
RBC # BLD AUTO: 2.51 M/UL (ref 4.2–5.9)
SODIUM SERPL-SCNC: 140 MMOL/L (ref 136–145)
SODIUM SERPL-SCNC: 141 MMOL/L (ref 136–145)
SP GR UR STRIP.AUTO: 1.06 (ref 1–1.03)
TIBC SERPL-MCNC: 293 UG/DL (ref 260–445)
TROPONIN, HIGH SENSITIVITY: 40 NG/L (ref 0–22)
TROPONIN, HIGH SENSITIVITY: 41 NG/L (ref 0–22)
UA COMPLETE W REFLEX CULTURE PNL UR: NORMAL
UA DIPSTICK W REFLEX MICRO PNL UR: NORMAL
URN SPEC COLLECT METH UR: NORMAL
UROBILINOGEN UR STRIP-ACNC: 0.2 E.U./DL
WBC # BLD AUTO: 3.6 K/UL (ref 4–11)
WBC # BLD AUTO: 3.8 K/UL (ref 4–11)

## 2025-07-16 PROCEDURE — 86923 COMPATIBILITY TEST ELECTRIC: CPT

## 2025-07-16 PROCEDURE — 85025 COMPLETE CBC W/AUTO DIFF WBC: CPT

## 2025-07-16 PROCEDURE — 30233N1 TRANSFUSION OF NONAUTOLOGOUS RED BLOOD CELLS INTO PERIPHERAL VEIN, PERCUTANEOUS APPROACH: ICD-10-PCS | Performed by: INTERNAL MEDICINE

## 2025-07-16 PROCEDURE — 36415 COLL VENOUS BLD VENIPUNCTURE: CPT

## 2025-07-16 PROCEDURE — 86900 BLOOD TYPING SEROLOGIC ABO: CPT

## 2025-07-16 PROCEDURE — 99285 EMERGENCY DEPT VISIT HI MDM: CPT

## 2025-07-16 PROCEDURE — 85610 PROTHROMBIN TIME: CPT

## 2025-07-16 PROCEDURE — 85018 HEMOGLOBIN: CPT

## 2025-07-16 PROCEDURE — 85014 HEMATOCRIT: CPT

## 2025-07-16 PROCEDURE — 6370000000 HC RX 637 (ALT 250 FOR IP): Performed by: INTERNAL MEDICINE

## 2025-07-16 PROCEDURE — 86901 BLOOD TYPING SEROLOGIC RH(D): CPT

## 2025-07-16 PROCEDURE — 83690 ASSAY OF LIPASE: CPT

## 2025-07-16 PROCEDURE — 2060000000 HC ICU INTERMEDIATE R&B

## 2025-07-16 PROCEDURE — 74174 CTA ABD&PLVS W/CONTRAST: CPT

## 2025-07-16 PROCEDURE — 83605 ASSAY OF LACTIC ACID: CPT

## 2025-07-16 PROCEDURE — 2580000003 HC RX 258: Performed by: HOSPITALIST

## 2025-07-16 PROCEDURE — 93005 ELECTROCARDIOGRAM TRACING: CPT | Performed by: STUDENT IN AN ORGANIZED HEALTH CARE EDUCATION/TRAINING PROGRAM

## 2025-07-16 PROCEDURE — 81003 URINALYSIS AUTO W/O SCOPE: CPT

## 2025-07-16 PROCEDURE — 83880 ASSAY OF NATRIURETIC PEPTIDE: CPT

## 2025-07-16 PROCEDURE — 84484 ASSAY OF TROPONIN QUANT: CPT

## 2025-07-16 PROCEDURE — 86850 RBC ANTIBODY SCREEN: CPT

## 2025-07-16 PROCEDURE — 80053 COMPREHEN METABOLIC PANEL: CPT

## 2025-07-16 PROCEDURE — P9016 RBC LEUKOCYTES REDUCED: HCPCS

## 2025-07-16 PROCEDURE — 83735 ASSAY OF MAGNESIUM: CPT

## 2025-07-16 PROCEDURE — 36430 TRANSFUSION BLD/BLD COMPNT: CPT

## 2025-07-16 PROCEDURE — 6360000004 HC RX CONTRAST MEDICATION: Performed by: STUDENT IN AN ORGANIZED HEALTH CARE EDUCATION/TRAINING PROGRAM

## 2025-07-16 PROCEDURE — 2500000003 HC RX 250 WO HCPCS: Performed by: HOSPITALIST

## 2025-07-16 RX ORDER — POTASSIUM CHLORIDE 7.45 MG/ML
10 INJECTION INTRAVENOUS PRN
Status: DISCONTINUED | OUTPATIENT
Start: 2025-07-16 | End: 2025-07-18 | Stop reason: HOSPADM

## 2025-07-16 RX ORDER — SENNOSIDES 8.6 MG
650 CAPSULE ORAL 2 TIMES DAILY
COMMUNITY

## 2025-07-16 RX ORDER — POTASSIUM CHLORIDE 1500 MG/1
40 TABLET, EXTENDED RELEASE ORAL PRN
Status: DISCONTINUED | OUTPATIENT
Start: 2025-07-16 | End: 2025-07-18 | Stop reason: HOSPADM

## 2025-07-16 RX ORDER — IOPAMIDOL 755 MG/ML
75 INJECTION, SOLUTION INTRAVASCULAR
Status: COMPLETED | OUTPATIENT
Start: 2025-07-16 | End: 2025-07-16

## 2025-07-16 RX ORDER — SODIUM CHLORIDE 9 MG/ML
INJECTION, SOLUTION INTRAVENOUS PRN
Status: DISCONTINUED | OUTPATIENT
Start: 2025-07-16 | End: 2025-07-18 | Stop reason: HOSPADM

## 2025-07-16 RX ORDER — MAGNESIUM SULFATE IN WATER 40 MG/ML
2000 INJECTION, SOLUTION INTRAVENOUS PRN
Status: DISCONTINUED | OUTPATIENT
Start: 2025-07-16 | End: 2025-07-18 | Stop reason: HOSPADM

## 2025-07-16 RX ORDER — ACETAMINOPHEN 325 MG/1
650 TABLET ORAL EVERY 6 HOURS PRN
Status: DISCONTINUED | OUTPATIENT
Start: 2025-07-16 | End: 2025-07-18 | Stop reason: HOSPADM

## 2025-07-16 RX ORDER — SODIUM CHLORIDE 0.9 % (FLUSH) 0.9 %
5-40 SYRINGE (ML) INJECTION EVERY 12 HOURS SCHEDULED
Status: DISCONTINUED | OUTPATIENT
Start: 2025-07-16 | End: 2025-07-18 | Stop reason: HOSPADM

## 2025-07-16 RX ORDER — SODIUM CHLORIDE 9 MG/ML
INJECTION, SOLUTION INTRAVENOUS CONTINUOUS
Status: ACTIVE | OUTPATIENT
Start: 2025-07-16 | End: 2025-07-17

## 2025-07-16 RX ORDER — ONDANSETRON 4 MG/1
4 TABLET, ORALLY DISINTEGRATING ORAL EVERY 8 HOURS PRN
Status: DISCONTINUED | OUTPATIENT
Start: 2025-07-16 | End: 2025-07-18 | Stop reason: HOSPADM

## 2025-07-16 RX ORDER — PENTOXIFYLLINE 400 MG/1
400 TABLET, EXTENDED RELEASE ORAL
Status: DISCONTINUED | OUTPATIENT
Start: 2025-07-17 | End: 2025-07-18 | Stop reason: HOSPADM

## 2025-07-16 RX ORDER — POLYETHYLENE GLYCOL 3350 17 G/17G
17 POWDER, FOR SOLUTION ORAL DAILY PRN
Status: DISCONTINUED | OUTPATIENT
Start: 2025-07-16 | End: 2025-07-18 | Stop reason: HOSPADM

## 2025-07-16 RX ORDER — ONDANSETRON 2 MG/ML
4 INJECTION INTRAMUSCULAR; INTRAVENOUS EVERY 6 HOURS PRN
Status: DISCONTINUED | OUTPATIENT
Start: 2025-07-16 | End: 2025-07-18 | Stop reason: HOSPADM

## 2025-07-16 RX ORDER — ATORVASTATIN CALCIUM 40 MG/1
40 TABLET, FILM COATED ORAL DAILY
Status: DISCONTINUED | OUTPATIENT
Start: 2025-07-17 | End: 2025-07-18 | Stop reason: HOSPADM

## 2025-07-16 RX ORDER — ACETAMINOPHEN 650 MG/1
650 SUPPOSITORY RECTAL EVERY 6 HOURS PRN
Status: DISCONTINUED | OUTPATIENT
Start: 2025-07-16 | End: 2025-07-18 | Stop reason: HOSPADM

## 2025-07-16 RX ORDER — SODIUM CHLORIDE 0.9 % (FLUSH) 0.9 %
5-40 SYRINGE (ML) INJECTION PRN
Status: DISCONTINUED | OUTPATIENT
Start: 2025-07-16 | End: 2025-07-18 | Stop reason: HOSPADM

## 2025-07-16 RX ADMIN — SODIUM CHLORIDE: 0.9 INJECTION, SOLUTION INTRAVENOUS at 22:00

## 2025-07-16 RX ADMIN — IOPAMIDOL 75 ML: 755 INJECTION, SOLUTION INTRAVENOUS at 14:22

## 2025-07-16 RX ADMIN — SODIUM CHLORIDE, PRESERVATIVE FREE 10 ML: 5 INJECTION INTRAVENOUS at 22:02

## 2025-07-16 RX ADMIN — POLYETHYLENE GLYCOL-3350 AND ELECTROLYTES 4000 ML: 236; 6.74; 5.86; 2.97; 22.74 POWDER, FOR SOLUTION ORAL at 18:37

## 2025-07-16 ASSESSMENT — PAIN SCALES - GENERAL
PAINLEVEL_OUTOF10: 0
PAINLEVEL_OUTOF10: 0

## 2025-07-16 ASSESSMENT — ENCOUNTER SYMPTOMS
VOMITING: 0
ABDOMINAL PAIN: 0
BLOOD IN STOOL: 1
NAUSEA: 0
SHORTNESS OF BREATH: 0

## 2025-07-16 ASSESSMENT — LIFESTYLE VARIABLES
HOW MANY STANDARD DRINKS CONTAINING ALCOHOL DO YOU HAVE ON A TYPICAL DAY: PATIENT DECLINED
HOW OFTEN DO YOU HAVE A DRINK CONTAINING ALCOHOL: PATIENT DECLINED

## 2025-07-16 NOTE — H&P
V2.0  History and Physical      Name:  Dick Gonzalez /Age/Sex: 1945  (80 y.o. male)   MRN & CSN:  [id number] & [id number] Encounter Date/Time: 2025 4:02 PM EDT   Location:   PCP: Shiloh Gudino MD       Hospital Day: 1  History from:   patient and spouse  History of Present Illness:   Chief Complaint: Rectal bleed    Dick Gonzalez is a 80 y.o. male with a past medical history significant for paroxysmal atrial fibrillation, on Xarelto, also on aspirin, also has a history of peripheral vascular disease, has history of prostate cancer status postradiation it seems.  Has been having low-grade bleeding going on for the past several months, was seen by GI, at that time the bleeding had stopped, no interventions were performed, comes in today for ongoing slow rectal bleed, lately has had more fatigue and lightheadedness, seen by PCP recently, was called today because hemoglobin was low, patient stopped taking Xarelto few days ago.  Workup in the ED reveals a hemoglobin of 6.6, was transiently hypotensive, tenets of blood was ordered, at this time patient was admitted for further evaluation and management.     Review of Systems:    Pertinent positives and negatives discussed in HPI   Objective:   No intake or output data in the 24 hours ending 25 1602   Vitals:   Vitals:    25 1541 25 1543 25 1552 25 1553   BP: (!) 117/55 (!) 117/55 116/63    Pulse: 85 87 85 86   Resp: 15 21 15 14   Temp: 97.7 °F (36.5 °C)      TempSrc: Oral      SpO2: 100% 99% 100% 100%   Weight:           Past Medical History:     PMHx   Past Medical History:   Diagnosis Date    Atrial fibrillation (HCC)     new onset    Kidney stone     Mixed hyperlipidemia     managed by PCP    Near syncope     Palpitations     Prostate enlargement     Syncope and collapse      PSHX:  has a past surgical history that includes Colonoscopy (2001); Colonoscopy (2/15/2011); Kidney stone surgery; and Colonoscopy

## 2025-07-16 NOTE — ED NOTES
3/4/2024        RE: Johnson Acosta  [address]  [facility] [address]        [facility]    Chief Complaint   Patient presents with     RECHECK     HPI:  Johnson Acosta is a 89 year old  (5/22/1927), who is being seen today for an episodic care visit at: [facility] () [[id number]]. Today's concern is:   1. Paroxysmal atrial fibrillation (H)    2. Insomnia, unspecified type    3. Generalized pain      Patient seen for follow up, reports of having general pain and asking for APAP often, not sleeping well, per patient room mate is loud and is difficult to get to sleep at times, denies CP/palpitations, able to make needs known, overall appears healthy for age.    Allergies, and PMH/PSH reviewed in EPIC today.  REVIEW OF SYSTEMS:  4 point ROS including Respiratory, CV, GI and , other than that noted in the HPI,  is negative    Objective:   /59   Pulse 62   Temp 97.5  F (36.4  C)   Resp 18   Wt 82.1 kg (181 lb)   SpO2 97%   BMI 26.73 kg/m    GENERAL APPEARANCE:  in no distress, appears healthy  ENT:  Mouth and posterior oropharynx normal, moist mucous membranes  RESP:  lungs clear to auscultation , no respiratory distress  CV:  regular rate and rhythm, no murmur, rub, or gallop, no edema  ABDOMEN:  bowel sounds normal  M/S:   Gait and station abnormal transfer assist  SKIN:  Inspection of skin and subcutaneous tissue baseline  NEURO:   Examination of sensation by touch normal  PSYCH:  affect and mood normal    Labs done in SNF are in [facility]. Please refer to them using [facility]/Care Everywhere.    Assessment/Plan:  (I48.0) Paroxysmal atrial fibrillation (H)  (primary encounter diagnosis)  Comment: RRR, denies CP  Plan: continue xarelto and statin  -discontinue ASA81; having nosebleeds  -daily vitals    (G47.00) Insomnia, unspecified type  Comment: not sleeping well  Plan: discontinue melatonin  -change trazodone to 25mg at bedtime scheduled    (R52) Generalized  Pt to CT via stretcher at this time.    pain  Comment: aches and pains, nothing specific  Plan: change APAP to TID scheduled  -if having specific pain plan to start patch or topical versus systemic    MED REC REQUIRED  Post Medication Reconciliation Status: medication reconcilation previously completed during another office visit          Electronically signed by: BRAYDEN Storey CNP          Sincerely,        BRAYDEN Storey CNP

## 2025-07-16 NOTE — PROGRESS NOTES
Pharmacy Medication Reconciliation Note     List of medications patient is currently taking is complete.    Source of information:   1. Conversation with patient's family at bedside  2. EMR    Notes regarding home medications:   1. Patient had all of his morning home medication doses today before presenting to the ER.  2. Patient was previously taking Xarelto and aspirin daily - instructed to stop taking both at PCP appointment yesterday.      Natlaee Rich PharmD  7/16/2025 3:07 PM

## 2025-07-16 NOTE — PROGRESS NOTES
4 Eyes Skin Assessment     NAME:  Dick Gonzalez  YOB: 1945  MEDICAL RECORD NUMBER:  [id number]    The patient is being assessed for  Admission    I agree that at least one RN has performed a thorough Head to Toe Skin Assessment on the patient. ALL assessment sites listed below have been assessed.      Areas assessed by both nurses:    Head, Face, Ears, Shoulders, Back, Chest, Arms, Elbows, Hands, Sacrum. Buttock, Coccyx, Ischium, Legs. Feet and Heels, and Under Medical Devices         Does the Patient have a Wound? No noted wound(s)- coccyx red/blanchable       Alonzo Prevention initiated by RN: No  Wound Care Orders initiated by RN: No    For hospital-acquired stage 1 & 2 and ALL Stage 3,4, Unstageable, DTI, NWPT, and Complex wounds: place order “IP Wound Care/Ostomy Nurse Eval and Treat” by RN under : NA    New Ostomies, if present place, Ostomy referral order under : No     Nurse 1 eSignature: Electronically signed by Apurva Leggett RN on 7/16/25 at 5:27 PM EDT    **SHARE this note so that the co-signing nurse can place an eSignature**    Nurse 2 eSignature: Electronically signed by Myra Manriquez RN on 7/16/25 at 6:54 PM EDT

## 2025-07-16 NOTE — ED NOTES
ED TO INPATIENT SBAR HANDOFF    Patient Name: Dick Gonzalez   Preferred Name: Dick  : 1945  80 y.o.   Family/Caregiver Present: no   Code Status Order: Prior  PO Status: NPO:No  Telemetry Order: Yes  C-SSRS: Risk of Suicide: No Risk  Sitter no    Restraints:     Sepsis Risk Score      Situation  Chief Complaint   Patient presents with    Rectal Bleeding     Pt to ed c/o rectal bleeding. Pt states this is an ongoing issue. Pt reports abnormal labs from PCP      Brief Description of Patient's Condition: Pt resting in bed comfortably with eyes open, wife at bedside. Respirations even and unlabored, no distress noted. A&Ox4, room air.   Mental Status: oriented  Arrived from:Home  Imaging:   CTA ABDOMEN PELVIS W CONTRAST   Final Result   1. No evidence of acute/active GI hemorrhage.   2. Diverticulosis.   3. No acute abdominal or pelvic finding.           Abnormal labs:   Abnormal Labs Reviewed   COMPREHENSIVE METABOLIC PANEL W/ REFLEX TO MG FOR LOW K - Abnormal; Notable for the following components:       Result Value    Total Protein 5.8 (*)     All other components within normal limits   CBC WITH AUTO DIFFERENTIAL - Abnormal; Notable for the following components:    WBC 3.8 (*)     RBC 2.15 (*)     Hemoglobin 6.6 (*)     Hematocrit 19.8 (*)     Lymphocytes Absolute 0.9 (*)     All other components within normal limits    Narrative:     CALL  [facility] tel. [phone],  Hematology results called to and read back by JULIA Coats, 2025 14:01,  by CATHERINE   TROPONIN - Abnormal; Notable for the following components:    Troponin, High Sensitivity 40 (*)     All other components within normal limits   TROPONIN - Abnormal; Notable for the following components:    Troponin, High Sensitivity 41 (*)     All other components within normal limits   BRAIN NATRIURETIC PEPTIDE - Abnormal; Notable for the following components:    NT Pro-BNP 1,539 (*)     All other components within normal limits        Background  Allergies: No Known Allergies  History:   Past Medical History:   Diagnosis Date    Atrial fibrillation (HCC)     new onset    Kidney stone     Mixed hyperlipidemia     managed by PCP    Near syncope     Palpitations     Prostate enlargement     Syncope and collapse        Assessment  Vitals:        Vitals:    07/16/25 1543 07/16/25 1552 07/16/25 1553 07/16/25 1617   BP: (!) 117/55 116/63  122/61   Pulse: 87 85 86 72   Resp: 21 15 14 24   Temp:       TempSrc:       SpO2: 99% 100% 100% 96%   Weight:         Deterioration Index (DI): Deterioration Index: 29.37  Deterioration Index (DI) Interventions Performed:    O2 Flow Rate:    O2 Device: O2 Device: None (Room air)  Cardiac Rhythm:    Critical Lab Results: [unfilled]  Cultures: Cultures:   NIH Score: NIH     Active LDA's:   Peripheral IV 07/16/25 Posterior;Right Hand (Active)     Active Central Lines:                          Active Wounds:    Active Schofield's:    Active Feeding Tubes:      Administered Medications:   Medications   0.9 % sodium chloride infusion (has no administration in time range)   iopamidol (ISOVUE-370) 76 % injection 75 mL (75 mLs IntraVENous Given 7/16/25 1422)     Last documented pain medication administration:    Pertinent or High Risk Medications/Drips: no   If Yes, please provide details:   Blood Product Administration: yes  If Yes, please provide details: Blood Administration 150 mL/hr  Process Protocols/Bundles: N/A    Recommendation  Incomplete STAT orders:    Overdue Medications:    Patient Belongings:    Additional Comments:    If any further questions, please call Sending RN at [phone]      Admitting Unit Notification  Name of person notified and time:        Electronically signed by: Electronically signed by Ashley Coats RN on 7/16/2025 at 4:21 PM

## 2025-07-16 NOTE — PROGRESS NOTES
Patient arrived from ED to PCU 3656. Vital signs obtained, tele monitor applied, confirmed with CMU. See flowsheet for assessment, admission completed. Patient and wife oriented to room and call light. Updated on safety measures and plan of care, agreeable and v/u at this time. Bed locked in lowest position, call light within reach. Care ongoing at this time.

## 2025-07-16 NOTE — ED PROVIDER NOTES
[facility]GRESS[facility] C[facility]  EMERGENCY DEPARTMENT ENCOUNTER        Pt Name: Dick Gonzalez  MRN: [id number]  Birthdate 1945  Date of evaluation: 7/16/2025  Provider: Stephanie Li PA-C  PCP: Shiloh Gudino MD  Note Started: 1:37 PM EDT 7/16/25       I have seen and evaluated this patient with my supervising physician Jessi Bruce MD.      CHIEF COMPLAINT       Chief Complaint   Patient presents with    Rectal Bleeding     Pt to ed c/o rectal bleeding. Pt states this is an ongoing issue. Pt reports abnormal labs from PCP        HISTORY OF PRESENT ILLNESS: 1 or more Elements     History From: Patient and patient's wife  Limitations to history : None    Dick Gonzalez is a 80 y.o. male who presents brought in by wife from home via private vehicle.  Mr. Gonzalez is a 80-year-old male with past medical history of A-fib anticoagulated with Xarelto, hyperlipidemia, palpitations, prostate cancer who presents for evaluation of abnormal lab values.  Patient states that he has been experiencing rectal bleeding ongoing since April-3-4 bowel movements per day with dark blood and some clots.  He followed up with his PCP yesterday who performed routine lab work and alerted patient today that his hemoglobin and iron were low and to present to the ED for evaluation.  Has been off of Xarelto for 2 days.  Patient reports associated fatigue on exertion.  Otherwise he denies any headaches, lightheadedness, dizziness, chest pain, shortness of breath, abdominal pain, N/V/D, dysuria.  Reports that last colonoscopy was few years ago and had plans to have another colonoscopy this fall with Dr. Smith.    Nursing Notes were all reviewed and agreed with or any disagreements were addressed in the HPI.    REVIEW OF SYSTEMS :      Review of Systems   Constitutional:  Positive for fatigue. Negative for fever.   Respiratory:  Negative for shortness of breath.    Cardiovascular:  Negative for chest pain.   Gastrointestinal:  Positive  hyperlipidemia, TIA, prostate cancer who presents for evaluation of abnormal labs. Patient has been experiencing rectal bleeding ongoing for the past 3 months, seen in this emergency department for this and was told to follow-up as outpatient. Reports associated fatigue with exertion but denies other symptoms including chest pain, abdominal pain, N/V/D, dysuria. Saw his PCP yesterday and hemoglobin was found to be 7.8. Has been off of Xarelto and ASA for the past 2 days.  Anemia likely from GI source.  On evaluation today, CBC with H&H of 6.6 and 19.8 respectively, WBC 3.8, no thrombocytopenia. BMP without acute electrolyte abnormalities. No ALFONSO. No transaminitis. Initial troponin 40, repeat 1 hour troponin 41. EKG without evidence of acute ischemia. BNP approximately 1500. PT/INR WNL. Lipase WNL.  Started on 2 units of PRBCs.  CTA abdomen pelvis showing no active hemorrhage. Does reveal diverticulosis.   Based on emergency department evaluation do not feel that additional emergent treatment/work-up indicated at this time.  Based on limitations of ongoing treatment and ruling out additional medical conditions from the emergency department, I do feel that admission indicated.  Recommendations for admission have been discussed with Dick Gonzalez and/or surrogate who are in agreement with plan at this time.      Amount and/or Complexity of Data Reviewed  Labs: ordered.  Radiology: ordered.  ECG/medicine tests: ordered.    Risk  Prescription drug management.  Decision regarding hospitalization.            REASSESSMENT     ED Course as of 07/16/25 2009 Wed Jul 16, 2025   1407 Hemoglobin 6.6 down from 7.8 [BT]   1408 Will provide patient with 2 units of PRBCs [BT]      ED Course User Index  [BT] Jessi Bruce MD         I am the Primary Clinician of Record.  FINAL IMPRESSION      1. Rectal bleeding    2. Anemia, unspecified type          DISPOSITION/PLAN     DISPOSITION Admitted 07/16/2025 04:02:20 PM

## 2025-07-16 NOTE — ED TRIAGE NOTES
Pt to ed c/o rectal bleeding. Pt states this is an ongoing issue. Pt reports abnormal labs from PCP

## 2025-07-16 NOTE — CONSENT
Informed Consent for Blood Component Transfusion Note    I have discussed with the patient the rationale for blood component transfusion; its benefits in treating or preventing fatigue, organ damage, or death; and its risk which includes mild transfusion reactions, rare risk of blood borne infection, or more serious but rare reactions. I have discussed the alternatives to transfusion, including the risk and consequences of not receiving transfusion. The patient had an opportunity to ask questions and had agreed to proceed with transfusion of blood components.    Electronically signed by Stephanie Li PA-C on 7/16/25 at 2:20 PM EDT

## 2025-07-17 ENCOUNTER — ANESTHESIA EVENT (OUTPATIENT)
Dept: ENDOSCOPY | Age: 80
End: 2025-07-17
Payer: MEDICARE

## 2025-07-17 ENCOUNTER — ANESTHESIA (OUTPATIENT)
Dept: ENDOSCOPY | Age: 80
End: 2025-07-17
Payer: MEDICARE

## 2025-07-17 LAB
ALBUMIN SERPL-MCNC: 3.5 G/DL (ref 3.4–5)
ALBUMIN/GLOB SERPL: 2.1 {RATIO} (ref 1.1–2.2)
ALP SERPL-CCNC: 51 U/L (ref 40–129)
ALT SERPL-CCNC: 22 U/L (ref 10–40)
ANION GAP SERPL CALCULATED.3IONS-SCNC: 9 MMOL/L (ref 3–16)
AST SERPL-CCNC: 26 U/L (ref 15–37)
BASOPHILS # BLD: 0 K/UL (ref 0–0.2)
BASOPHILS NFR BLD: 0.7 %
BASOPHILS NFR BLD: 0.7 %
BASOPHILS NFR BLD: 1 %
BASOPHILS NFR BLD: 1.2 %
BILIRUB SERPL-MCNC: 0.6 MG/DL (ref 0–1)
BUN SERPL-MCNC: 13 MG/DL (ref 7–20)
CALCIUM SERPL-MCNC: 8.3 MG/DL (ref 8.3–10.6)
CHLORIDE SERPL-SCNC: 109 MMOL/L (ref 99–110)
CO2 SERPL-SCNC: 22 MMOL/L (ref 21–32)
CREAT SERPL-MCNC: 0.8 MG/DL (ref 0.8–1.3)
DEPRECATED RDW RBC AUTO: 13.2 % (ref 12.4–15.4)
DEPRECATED RDW RBC AUTO: 13.3 % (ref 12.4–15.4)
DEPRECATED RDW RBC AUTO: 13.5 % (ref 12.4–15.4)
DEPRECATED RDW RBC AUTO: 13.5 % (ref 12.4–15.4)
EOSINOPHIL # BLD: 0.1 K/UL (ref 0–0.6)
EOSINOPHIL # BLD: 0.2 K/UL (ref 0–0.6)
EOSINOPHIL NFR BLD: 3 %
EOSINOPHIL NFR BLD: 3.4 %
EOSINOPHIL NFR BLD: 3.9 %
EOSINOPHIL NFR BLD: 4.4 %
GFR SERPLBLD CREATININE-BSD FMLA CKD-EPI: >90 ML/MIN/{1.73_M2}
GLUCOSE SERPL-MCNC: 84 MG/DL (ref 70–99)
HCT VFR BLD AUTO: 23.5 % (ref 40.5–52.5)
HCT VFR BLD AUTO: 23.8 % (ref 40.5–52.5)
HCT VFR BLD AUTO: 26.4 % (ref 40.5–52.5)
HCT VFR BLD AUTO: 26.8 % (ref 40.5–52.5)
HCT VFR BLD AUTO: 29.1 % (ref 40.5–52.5)
HGB BLD-MCNC: 10 G/DL (ref 13.5–17.5)
HGB BLD-MCNC: 8.2 G/DL (ref 13.5–17.5)
HGB BLD-MCNC: 8.4 G/DL (ref 13.5–17.5)
HGB BLD-MCNC: 9 G/DL (ref 13.5–17.5)
HGB BLD-MCNC: 9.3 G/DL (ref 13.5–17.5)
LYMPHOCYTES # BLD: 0.9 K/UL (ref 1–5.1)
LYMPHOCYTES # BLD: 1 K/UL (ref 1–5.1)
LYMPHOCYTES # BLD: 1.1 K/UL (ref 1–5.1)
LYMPHOCYTES # BLD: 1.2 K/UL (ref 1–5.1)
LYMPHOCYTES NFR BLD: 24.3 %
LYMPHOCYTES NFR BLD: 28 %
LYMPHOCYTES NFR BLD: 28 %
LYMPHOCYTES NFR BLD: 31.5 %
MCH RBC QN AUTO: 31.1 PG (ref 26–34)
MCH RBC QN AUTO: 31.2 PG (ref 26–34)
MCH RBC QN AUTO: 31.3 PG (ref 26–34)
MCH RBC QN AUTO: 31.4 PG (ref 26–34)
MCHC RBC AUTO-ENTMCNC: 34 G/DL (ref 31–36)
MCHC RBC AUTO-ENTMCNC: 34.6 G/DL (ref 31–36)
MCHC RBC AUTO-ENTMCNC: 34.8 G/DL (ref 31–36)
MCHC RBC AUTO-ENTMCNC: 35.1 G/DL (ref 31–36)
MCV RBC AUTO: 89.6 FL (ref 80–100)
MCV RBC AUTO: 89.6 FL (ref 80–100)
MCV RBC AUTO: 90.5 FL (ref 80–100)
MCV RBC AUTO: 91.4 FL (ref 80–100)
MONOCYTES # BLD: 0.4 K/UL (ref 0–1.3)
MONOCYTES # BLD: 0.5 K/UL (ref 0–1.3)
MONOCYTES # BLD: 0.6 K/UL (ref 0–1.3)
MONOCYTES # BLD: 0.6 K/UL (ref 0–1.3)
MONOCYTES NFR BLD: 12.8 %
MONOCYTES NFR BLD: 13.4 %
MONOCYTES NFR BLD: 13.4 %
MONOCYTES NFR BLD: 14.4 %
NEUTROPHILS # BLD: 1.7 K/UL (ref 1.7–7.7)
NEUTROPHILS # BLD: 2.1 K/UL (ref 1.7–7.7)
NEUTROPHILS # BLD: 2.1 K/UL (ref 1.7–7.7)
NEUTROPHILS # BLD: 2.4 K/UL (ref 1.7–7.7)
NEUTROPHILS NFR BLD: 51.7 %
NEUTROPHILS NFR BLD: 53 %
NEUTROPHILS NFR BLD: 53 %
NEUTROPHILS NFR BLD: 58.2 %
PLATELET # BLD AUTO: 193 K/UL (ref 135–450)
PLATELET # BLD AUTO: 195 K/UL (ref 135–450)
PLATELET # BLD AUTO: 196 K/UL (ref 135–450)
PLATELET # BLD AUTO: 197 K/UL (ref 135–450)
PMV BLD AUTO: 7 FL (ref 5–10.5)
PMV BLD AUTO: 7 FL (ref 5–10.5)
PMV BLD AUTO: 7.3 FL (ref 5–10.5)
PMV BLD AUTO: 7.3 FL (ref 5–10.5)
POTASSIUM SERPL-SCNC: 3.8 MMOL/L (ref 3.5–5.1)
PROT SERPL-MCNC: 5.2 G/DL (ref 6.4–8.2)
RBC # BLD AUTO: 2.63 M/UL (ref 4.2–5.9)
RBC # BLD AUTO: 2.66 M/UL (ref 4.2–5.9)
RBC # BLD AUTO: 2.89 M/UL (ref 4.2–5.9)
RBC # BLD AUTO: 2.96 M/UL (ref 4.2–5.9)
SODIUM SERPL-SCNC: 140 MMOL/L (ref 136–145)
WBC # BLD AUTO: 3.2 K/UL (ref 4–11)
WBC # BLD AUTO: 3.9 K/UL (ref 4–11)
WBC # BLD AUTO: 4 K/UL (ref 4–11)
WBC # BLD AUTO: 4.1 K/UL (ref 4–11)

## 2025-07-17 PROCEDURE — 6370000000 HC RX 637 (ALT 250 FOR IP): Performed by: HOSPITALIST

## 2025-07-17 PROCEDURE — 3609009900 HC COLONOSCOPY W/CONTROL BLEEDING ANY METHOD: Performed by: INTERNAL MEDICINE

## 2025-07-17 PROCEDURE — 2709999900 HC NON-CHARGEABLE SUPPLY: Performed by: INTERNAL MEDICINE

## 2025-07-17 PROCEDURE — 2500000003 HC RX 250 WO HCPCS: Performed by: HOSPITALIST

## 2025-07-17 PROCEDURE — 0W3P8ZZ CONTROL BLEEDING IN GASTROINTESTINAL TRACT, VIA NATURAL OR ARTIFICIAL OPENING ENDOSCOPIC: ICD-10-PCS | Performed by: INTERNAL MEDICINE

## 2025-07-17 PROCEDURE — 7100000001 HC PACU RECOVERY - ADDTL 15 MIN: Performed by: INTERNAL MEDICINE

## 2025-07-17 PROCEDURE — 2720000010 HC SURG SUPPLY STERILE: Performed by: INTERNAL MEDICINE

## 2025-07-17 PROCEDURE — 36415 COLL VENOUS BLD VENIPUNCTURE: CPT

## 2025-07-17 PROCEDURE — 85025 COMPLETE CBC W/AUTO DIFF WBC: CPT

## 2025-07-17 PROCEDURE — 6360000002 HC RX W HCPCS: Performed by: INTERNAL MEDICINE

## 2025-07-17 PROCEDURE — 94760 N-INVAS EAR/PLS OXIMETRY 1: CPT

## 2025-07-17 PROCEDURE — 80053 COMPREHEN METABOLIC PANEL: CPT

## 2025-07-17 PROCEDURE — 2060000000 HC ICU INTERMEDIATE R&B

## 2025-07-17 PROCEDURE — 3700000001 HC ADD 15 MINUTES (ANESTHESIA): Performed by: INTERNAL MEDICINE

## 2025-07-17 PROCEDURE — 3700000000 HC ANESTHESIA ATTENDED CARE: Performed by: INTERNAL MEDICINE

## 2025-07-17 PROCEDURE — 6360000002 HC RX W HCPCS: Performed by: NURSE ANESTHETIST, CERTIFIED REGISTERED

## 2025-07-17 PROCEDURE — 7100000000 HC PACU RECOVERY - FIRST 15 MIN: Performed by: INTERNAL MEDICINE

## 2025-07-17 DEVICE — REPLAY HEMOSTASIS CLIP, 16MM SPAN
Type: IMPLANTABLE DEVICE | Site: RECTUM | Status: FUNCTIONAL
Brand: REPLAY

## 2025-07-17 RX ORDER — PROPOFOL 10 MG/ML
INJECTION, EMULSION INTRAVENOUS
Status: DISCONTINUED | OUTPATIENT
Start: 2025-07-17 | End: 2025-07-17 | Stop reason: SDUPTHER

## 2025-07-17 RX ORDER — LIDOCAINE HYDROCHLORIDE 20 MG/ML
INJECTION, SOLUTION INFILTRATION; PERINEURAL
Status: DISCONTINUED | OUTPATIENT
Start: 2025-07-17 | End: 2025-07-17 | Stop reason: SDUPTHER

## 2025-07-17 RX ORDER — SODIUM CHLORIDE 0.9 % (FLUSH) 0.9 %
5-40 SYRINGE (ML) INJECTION EVERY 12 HOURS SCHEDULED
Status: DISCONTINUED | OUTPATIENT
Start: 2025-07-17 | End: 2025-07-17

## 2025-07-17 RX ORDER — SODIUM CHLORIDE 0.9 % (FLUSH) 0.9 %
5-40 SYRINGE (ML) INJECTION PRN
Status: DISCONTINUED | OUTPATIENT
Start: 2025-07-17 | End: 2025-07-17

## 2025-07-17 RX ORDER — ONDANSETRON 2 MG/ML
4 INJECTION INTRAMUSCULAR; INTRAVENOUS
Status: DISCONTINUED | OUTPATIENT
Start: 2025-07-17 | End: 2025-07-17

## 2025-07-17 RX ORDER — SODIUM CHLORIDE 9 MG/ML
INJECTION, SOLUTION INTRAVENOUS PRN
Status: DISCONTINUED | OUTPATIENT
Start: 2025-07-17 | End: 2025-07-17

## 2025-07-17 RX ADMIN — IRON SUCROSE 200 MG: 20 INJECTION, SOLUTION INTRAVENOUS at 14:28

## 2025-07-17 RX ADMIN — SODIUM CHLORIDE, PRESERVATIVE FREE 10 ML: 5 INJECTION INTRAVENOUS at 21:51

## 2025-07-17 RX ADMIN — PENTOXIFYLLINE 400 MG: 400 TABLET, EXTENDED RELEASE ORAL at 08:59

## 2025-07-17 RX ADMIN — PENTOXIFYLLINE 400 MG: 400 TABLET, EXTENDED RELEASE ORAL at 19:34

## 2025-07-17 RX ADMIN — LIDOCAINE HYDROCHLORIDE 50 MG: 20 INJECTION, SOLUTION INFILTRATION; PERINEURAL at 12:53

## 2025-07-17 RX ADMIN — PROPOFOL 150 MCG/KG/MIN: 10 INJECTION, EMULSION INTRAVENOUS at 12:54

## 2025-07-17 RX ADMIN — ATORVASTATIN CALCIUM 40 MG: 40 TABLET, FILM COATED ORAL at 08:59

## 2025-07-17 RX ADMIN — PROPOFOL 80 MG: 10 INJECTION, EMULSION INTRAVENOUS at 12:53

## 2025-07-17 ASSESSMENT — ENCOUNTER SYMPTOMS: SHORTNESS OF BREATH: 0

## 2025-07-17 ASSESSMENT — PAIN SCALES - GENERAL
PAINLEVEL_OUTOF10: 0

## 2025-07-17 NOTE — OP NOTE
Colonoscopy Procedure Note      Patient: Dick Gonzalez  : 1945  Acct#:     Procedure: Colonoscopy with control of bleeding    Date:  2025    Surgeon:  Erik Jenkins MD    Referring Physician:  Shiloh Gudino MD    Previous Colonoscopy: YES  Date:   Greater than 3 years: YES    Preoperative Diagnosis:  1. Rectal bleeding    Postoperative Diagnosis:  1. Mild Sigmoid Diverticulosis 2. Radiation proctitis with active bleeding. Cauterized/clipped     Consent:  The patient or their legal guardian has signed a consent, and is aware of the potential risks, benefits, alternatives, and potential complications of this procedure.  These include, but are not limited to hemorrhage, bleeding, post procedural pain, perforation, phlebitis, aspiration, hypotension, hypoxia, cardiovascular events such as arryhthmia, and possibly death.  Additionally, the possibility of missed colonic polyps and interval colon cancer was discussed in the consent.    Anesthesia:  The patient was administered IV propofol per anesthesiology team.  Please see their operative records for full details.      Procedure:   An informed consent was obtained from the patient after explanation of indications, benefits, possible risks and complications of the procedure.  The patient was then taken to the endoscopy suite, placed in the left lateral decubitus position, and the above IV anesthesia was administered.    A digital rectal examination was performed and revealed negative without mass, lesions or tenderness.      The Olympus video colonoscope was placed in the patient's rectum under digital direction and advanced to the cecum. The cecum was identified by characteristic anatomy and ballottment.  The preparation was good.  The ileocecal valve was identified.     The terminal ileum was     The scope was then withdrawn back through the cecum, ascending, transverse, descending, sigmoid colon, and rectum.  Careful circumferential

## 2025-07-17 NOTE — CONSULTS
GASTROENTEROLOGY INPATIENT CONSULTATION:      IDENTIFYING DATA/REASON FOR CONSULTATION   PATIENT:  Dick Gonzalez  MRN:  [id number]  ADMIT DATE: 7/16/2025  TIME OF EVALUATION: 7/17/2025 8:18 AM  HOSPITAL STAY:   LOS: 1 day     REASON FOR CONSULTATION:  GI bleed    HISTORY OF PRESENT ILLNESS   Dick Gonzalez is a 80-year-old male with a past medical history of atrial fibrillation on Xarelto, peripheral vascular disease, and constipation who presents for with rectal bleeding. Patient presented with increasing fatigue and lightheadedness. Hemoglobin was 6.6. He reports daily bleeding of BRB per rectum for several months. No abdominal pain or weight loss. No change in bowel habits. Patient completed bowel prep overnight with no further bleeding on last BM.     CT scan performed in the ER showed no evidence of actively bleeding. Patient's last colonoscopy was performed in 2021 per Dr. Sumner with polyps and a 5-year follow-up recommended.     PAST MEDICAL, SURGICAL, FAMILY, and SOCIAL HISTORY     Past Medical History:   Diagnosis Date    Atrial fibrillation (HCC)     new onset    Kidney stone     Mixed hyperlipidemia     managed by PCP    Near syncope     Palpitations     Prostate enlargement     Syncope and collapse      Past Surgical History:   Procedure Laterality Date    COLONOSCOPY  9/26/2001    negativedavid, also neg sigmoid 2007    COLONOSCOPY  2/15/2011    negative dr hernandez    COLONOSCOPY  08/23/2021    dr sumner, polyps, repeat in 5 years    KIDNEY STONE SURGERY       Family History   Problem Relation Age of Onset    Rheum Arthritis Brother     Heart Disease Brother     Heart Disease Mother         older age    Dementia Father      Social History     Socioeconomic History    Marital status:      Spouse name: None    Number of children: None    Years of education: None    Highest education level: None   Occupational History    Occupation: retired   Tobacco Use    Smoking status: Never    Smokeless  3.8 3.4 - 5.0 g/dL    Albumin/Globulin Ratio 1.9 1.1 - 2.2    Total Bilirubin 0.4 0.0 - 1.0 mg/dL    Alkaline Phosphatase 55 40 - 129 U/L    ALT 26 10 - 40 U/L    AST 30 15 - 37 U/L   CBC with Auto Differential    Collection Time: 07/16/25  1:54 PM   Result Value Ref Range    WBC 3.8 (L) 4.0 - 11.0 K/uL    RBC 2.15 (L) 4.20 - 5.90 M/uL    Hemoglobin 6.6 (LL) 13.5 - 17.5 g/dL    Hematocrit 19.8 (LL) 40.5 - 52.5 %    MCV 92.2 80.0 - 100.0 fL    MCH 30.8 26.0 - 34.0 pg    MCHC 33.4 31.0 - 36.0 g/dL    RDW 14.1 12.4 - 15.4 %    Platelets 212 135 - 450 K/uL    MPV 7.0 5.0 - 10.5 fL    Neutrophils % 59.3 %    Lymphocytes % 23.9 %    Monocytes % 13.0 %    Eosinophils % 2.8 %    Basophils % 1.0 %    Neutrophils Absolute 2.3 1.7 - 7.7 K/uL    Lymphocytes Absolute 0.9 (L) 1.0 - 5.1 K/uL    Monocytes Absolute 0.5 0.0 - 1.3 K/uL    Eosinophils Absolute 0.1 0.0 - 0.6 K/uL    Basophils Absolute 0.0 0.0 - 0.2 K/uL   Magnesium    Collection Time: 07/16/25  1:54 PM   Result Value Ref Range    Magnesium 2.04 1.80 - 2.40 mg/dL   Troponin    Collection Time: 07/16/25  1:54 PM   Result Value Ref Range    Troponin, High Sensitivity 40 (H) 0 - 22 ng/L   Brain Natriuretic Peptide    Collection Time: 07/16/25  1:54 PM   Result Value Ref Range    NT Pro-BNP 1,539 (H) 0 - 449 pg/mL   Protime-INR    Collection Time: 07/16/25  1:54 PM   Result Value Ref Range    Protime 14.1 12.1 - 14.9 sec    INR 1.06 0.86 - 1.14   Hepatic Function Panel    Collection Time: 07/16/25  1:54 PM   Result Value Ref Range    Bilirubin, Direct 0.2 0.0 - 0.3 mg/dL    Bilirubin, Indirect 0.2 0.0 - 1.0 mg/dL   Lipase    Collection Time: 07/16/25  1:54 PM   Result Value Ref Range    Lipase 51.0 13.0 - 60.0 U/L   Troponin    Collection Time: 07/16/25  2:45 PM   Result Value Ref Range    Troponin, High Sensitivity 41 (H) 0 - 22 ng/L   Urinalysis with Reflex to Culture    Collection Time: 07/16/25  4:45 PM    Specimen: Urine   Result Value Ref Range    Color, UA Yellow

## 2025-07-17 NOTE — PROGRESS NOTES
V2.0    [facility] [place]      Name:  Dick Gonzalez /Age/Sex: 1945  (80 y.o. male)   MRN & CSN:  [id number] & [id number] Encounter Date/Time: 2025 6:17 PM EDT   Location:  [id number]-[id number]/5114-01 PCP: Shiloh Gudino MD     Attending:Yari Singh MD       Hospital Day: 2    Assessment and Recommendations   Dick Gonzalez is a 80 y.o. male who presents with GI bleed      Plan:       # Acute blood loss anemia  # Lower GI bleed, suspect diverticular  # History of prostate cancer  # Paroxysmal atrial fibrillation  # Elevated troponin    - GI consulted, s/p colonoscopy today  - CT abd reviewed  - monitor hb, transfuse if <7  - started on IV iron  - poss dc in am if hb stable      Diet ADULT DIET; Regular   DVT Prophylaxis [] Lovenox, []  Heparin, [x] SCDs, [] Ambulation,  [] Eliquis, [] Xarelto  [] Coumadin   Code Status Full Code             Personally reviewed Lab Studies and Imaging         Subjective:     Getting colonoscopy today, denies any more blood in stool    Review of Systems:      Pertinent positives and negatives discussed in HPI    Objective:     Intake/Output Summary (Last 24 hours) at 2025 1817  Last data filed at 2025 0053  Gross per 24 hour   Intake 446.29 ml   Output --   Net 446.29 ml      Vitals:   Vitals:    25 1335 25 1340 25 1400 25 1556   BP: 119/63 138/72 127/68 125/71   Pulse: 76 80     Resp:    Temp:  98.2 °F (36.8 °C)  96.8 °F (36 °C)   TempSrc:    Oral   SpO2: 97% 97%     Weight:       Height:             Physical Exam:      General: Awake, oriented  HEENT: Pupils round, reacting to light  Heart: S1 S2 heard, no murmurs  Lung: Clear b/l  Abd: Soft, not distended, not tender, BS +  Extr: No cyanosis or clubbing  Neuro: No focal deficits.      Medications:   Medications:    iron sucrose  200 mg IntraVENous Daily    sodium chloride flush  5-40 mL IntraVENous 2 times per day    atorvastatin  40 mg Oral Daily    pentoxifylline  400 mg

## 2025-07-17 NOTE — ACP (ADVANCE CARE PLANNING)
Advance Care Planning   Healthcare Decision Maker:    Primary Decision Maker: Louisa Gonzalez - Spouse - [phone]      Today we documented Decision Maker(s) consistent with Legal Next of Kin hierarchy.       Electronically signed by Iram Mccabe RN on 7/17/2025 at 3:48 PM

## 2025-07-17 NOTE — CARE COORDINATION
Case Management Assessment  Initial Evaluation    Date/Time of Evaluation: 7/17/2025 3:51 PM  Assessment Completed by: Iram Mccabe RN    If patient is discharged prior to next notation, then this note serves as note for discharge by case management.    Patient Name: Dick Gonzalez                   YOB: 1945  Diagnosis: Rectal bleeding [K62.5]  GI bleed [K92.2]  Anemia, unspecified type [D64.9]                   Date / Time: 7/16/2025  1:20 PM    Patient Admission Status: Inpatient   Readmission Risk (Low < 19, Mod (19-27), High > 27): Readmission Risk Score: 14.4    Current PCP: Shiloh Gudino MD  PCP verified by CM? Yes    Chart Reviewed: Yes      History Provided by: Patient, Significant Other  Patient Orientation: Alert and Oriented    Patient Cognition: Alert    Hospitalization in the last 30 days (Readmission):  No    If yes, Readmission Assessment in  Navigator will be completed.    Advance Directives:      Code Status: Full Code   Patient's Primary Decision Maker is: Legal Next of Kin    Primary Decision Maker: Louisa Gonzalez - Spouse - [phone]    Discharge Planning:    Patient lives with: Spouse/Significant Other Type of Home: House  Primary Care Giver: Self  Patient Support Systems include: Spouse/Significant Other, Family Members   Current Financial resources: Medicare  Current community resources: None  Current services prior to admission: None, Durable Medical Equipment            Current DME: Other (Comment) ([employer]            Type of Home Care services:  None    ADLS  Prior functional level: Assistance with the following:, Mobility  Current functional level: Assistance with the following:, Mobility    PT AM-PAC:   /24  OT AM-PAC:   /24    Family can provide assistance at DC: Yes  Would you like Case Management to discuss the discharge plan with any other family members/significant others, and if so, who? Yes (wife)  Plans to Return to Present Housing:

## 2025-07-17 NOTE — ED PROVIDER NOTES
ED Attending Staffing Note  Patient was seen with the DANTE Li. I was present for the significant portions of the H&P as well as performance and interpretation of procedures and EKGs.     I have personally performed a face to face evaluation on this patient. I have reviewed and agree with history and physical examination patient management and disposition. I have personally saw the patient and take full responsibility for patient management.     CHIEF COMPLAINT    Rectal Bleeding (Pt to ed c/o rectal bleeding. Pt states this is an ongoing issue. Pt reports abnormal labs from PCP )      PAST MEDICAL HISTORY    Past Medical History:   Diagnosis Date    Atrial fibrillation (HCC)     new onset    Kidney stone     Mixed hyperlipidemia     managed by PCP    Near syncope     Palpitations     Prostate enlargement     Syncope and collapse        Vitals  /77   Pulse 77   Temp 97.3 °F (36.3 °C) (Oral)   Resp 16   Ht 1.664 m (5' 5.5\")   Wt 76.4 kg (168 lb 6.9 oz)   SpO2 94%   BMI 27.60 kg/m²     HPI:  Dick Gonzalez is a 80 y.o. male with history of A-fib on [facility] who presents with anemia and blood in stool. Briefly the salient points of the case are as follows:  Patient sent from primary care doctor office with [facility] anemia, has been having bloody stools for the last several months  History of prostate cancer not currently on treatment  Otherwise reports exertional dyspnea otherwise asymptomatic    MDM:  No acute distress, vital signs here are reassuring  80-year-old male here today with acute on chronic anemia.  He had a 1 point drop in his hemoglobin from the labs drawn yesterday his baseline was otherwise normal.  He will need to be admitted here for an anemia workup suspect the source likely is a GI etiology.  He has held his own anticoagulation prior to assessment here today.  He will be given 2 units of blood he is otherwise hemodynamically stable.  Patient's EKG shows atrial fibrillation rate of 81,

## 2025-07-17 NOTE — ANESTHESIA PRE PROCEDURE
Procedure Laterality Date    COLONOSCOPY  9/26/2001    negative, david, also neg sigmoid 2007    COLONOSCOPY  2/15/2011    negative dr hernandez    COLONOSCOPY  08/23/2021    dr sumner, polyps, repeat in 5 years    KIDNEY STONE SURGERY         Social History:    Social History     Tobacco Use    Smoking status: Never    Smokeless tobacco: Never   Substance Use Topics    Alcohol use: Not Currently     Comment: minimal caffeine occasional wine                                Counseling given: Not Answered      Vital Signs (Current):   Vitals:    07/17/25 0430 07/17/25 0719 07/17/25 1126 07/17/25 1142   BP: (!) 144/58 124/61 139/68 131/75   Pulse:  79  92   Resp:   18 18   Temp: 98.5 °F (36.9 °C) 98.9 °F (37.2 °C) 98.7 °F (37.1 °C) 98 °F (36.7 °C)   TempSrc: Oral Oral Oral Temporal   SpO2: 100% 97%  98%   Weight: 75.6 kg (166 lb 10.7 oz)      Height:                                                  BP Readings from Last 3 Encounters:   07/17/25 131/75   07/15/25 116/78   06/09/25 125/69       NPO Status: Time of last liquid consumption: 0800 (sip water)                        Time of last solid consumption: 0900                        Date of last liquid consumption: 07/15/25                        Date of last solid food consumption: 07/16/25    BMI:   Wt Readings from Last 3 Encounters:   07/17/25 75.6 kg (166 lb 10.7 oz)   07/15/25 77 kg (169 lb 12.8 oz)   06/09/25 78.9 kg (174 lb)     Body mass index is 27.31 kg/m².    CBC:   Lab Results   Component Value Date/Time    WBC 3.2 07/17/2025 07:31 AM    RBC 2.66 07/17/2025 07:31 AM    HGB 8.4 07/17/2025 07:31 AM    HCT 23.8 07/17/2025 07:31 AM    MCV 89.6 07/17/2025 07:31 AM    RDW 13.5 07/17/2025 07:31 AM     07/17/2025 07:31 AM       CMP:   Lab Results   Component Value Date/Time     07/17/2025 06:33 AM    K 3.8 07/17/2025 06:33 AM     07/17/2025 06:33 AM    CO2 22 07/17/2025 06:33 AM    BUN 13 07/17/2025 06:33 AM    CREATININE 0.8 07/17/2025

## 2025-07-17 NOTE — ANESTHESIA POSTPROCEDURE EVALUATION
Department of Anesthesiology  Postprocedure Note    Patient: Dick Gonzalez  MRN: [id number]  YOB: 1945  Date of evaluation: 7/17/2025    Procedure Summary       Date: 07/17/25 Room / Location: [place] [address] / [facility]    Anesthesia Start: 1248 Anesthesia Stop: 1318    Procedure: COLONOSCOPY CONTROL HEMORRHAGE with gold prob and clips Diagnosis:       Gastrointestinal hemorrhage with melena      (Gastrointestinal hemorrhage with melena [K92.1])    Surgeons: Erik Jenkins MD Responsible Provider: Kristine Swartz MD    Anesthesia Type: MAC ASA Status: 3            Anesthesia Type: No value filed.    Poli Phase I: Poli Score: 10    Poli Phase II:      Anesthesia Post Evaluation    Patient location during evaluation: PACU  Patient participation: complete - patient participated  Level of consciousness: awake and alert  Airway patency: patent  Nausea & Vomiting: no nausea and no vomiting  Cardiovascular status: hemodynamically stable  Respiratory status: acceptable  Hydration status: stable  Pain management: adequate    No notable events documented.

## 2025-07-17 NOTE — PROGRESS NOTES
Pt is alert and oriented and denies pain at this time, vital signs stable on room air. Report called to Kamila DUMONT on floor, all questions answered. Pt transferred to room 5114 with tele box in place.

## 2025-07-17 NOTE — FLOWSHEET NOTE
Pt to PACU from Endo, Pt is resting comfortably with stable vital signs on room air.      07/17/25 1320   Vital Signs   Temp 98.3 °F (36.8 °C)   Pulse 95   Respirations 22   BP (!) 100/56   MAP (Calculated) 71   Pain Assessment   Pain Assessment Adult Nonverbal Pain Scale (NPVS)   Pain Level 0   Adult Nonverbal Pain Scale (NVPS)   Face 0   Activity (Movement) 0   Guarding 0   Physiology (Vital Signs) 0   Respiratory 0   NVPS Score  0   Oxygen Therapy   SpO2 98 %   O2 Device None (Room air)   Rhythm Interpretation   Cardiac Rhythm Atrial fib

## 2025-07-18 VITALS
TEMPERATURE: 98 F | HEIGHT: 66 IN | SYSTOLIC BLOOD PRESSURE: 103 MMHG | OXYGEN SATURATION: 98 % | BODY MASS INDEX: 27.1 KG/M2 | RESPIRATION RATE: 18 BRPM | HEART RATE: 94 BPM | WEIGHT: 168.65 LBS | DIASTOLIC BLOOD PRESSURE: 75 MMHG

## 2025-07-18 LAB
BASOPHILS # BLD: 0 K/UL (ref 0–0.2)
BASOPHILS NFR BLD: 0.8 %
DEPRECATED RDW RBC AUTO: 13.6 % (ref 12.4–15.4)
EOSINOPHIL # BLD: 0.2 K/UL (ref 0–0.6)
EOSINOPHIL NFR BLD: 3.5 %
HCT VFR BLD AUTO: 26.8 % (ref 40.5–52.5)
HGB BLD-MCNC: 9 G/DL (ref 13.5–17.5)
LYMPHOCYTES # BLD: 1 K/UL (ref 1–5.1)
LYMPHOCYTES NFR BLD: 23.3 %
MCH RBC QN AUTO: 30.6 PG (ref 26–34)
MCHC RBC AUTO-ENTMCNC: 33.6 G/DL (ref 31–36)
MCV RBC AUTO: 91.2 FL (ref 80–100)
MONOCYTES # BLD: 0.6 K/UL (ref 0–1.3)
MONOCYTES NFR BLD: 13 %
NEUTROPHILS # BLD: 2.6 K/UL (ref 1.7–7.7)
NEUTROPHILS NFR BLD: 59.4 %
PLATELET # BLD AUTO: 222 K/UL (ref 135–450)
PMV BLD AUTO: 7.4 FL (ref 5–10.5)
RBC # BLD AUTO: 2.94 M/UL (ref 4.2–5.9)
WBC # BLD AUTO: 4.3 K/UL (ref 4–11)

## 2025-07-18 PROCEDURE — 85025 COMPLETE CBC W/AUTO DIFF WBC: CPT

## 2025-07-18 PROCEDURE — 6370000000 HC RX 637 (ALT 250 FOR IP): Performed by: HOSPITALIST

## 2025-07-18 PROCEDURE — 2500000003 HC RX 250 WO HCPCS: Performed by: HOSPITALIST

## 2025-07-18 PROCEDURE — 36415 COLL VENOUS BLD VENIPUNCTURE: CPT

## 2025-07-18 PROCEDURE — 6360000002 HC RX W HCPCS: Performed by: INTERNAL MEDICINE

## 2025-07-18 PROCEDURE — 94760 N-INVAS EAR/PLS OXIMETRY 1: CPT

## 2025-07-18 RX ORDER — FERROUS SULFATE 325(65) MG
325 TABLET ORAL 2 TIMES DAILY
Qty: 60 TABLET | Refills: 0 | Status: SHIPPED | OUTPATIENT
Start: 2025-07-18

## 2025-07-18 RX ADMIN — ATORVASTATIN CALCIUM 40 MG: 40 TABLET, FILM COATED ORAL at 09:59

## 2025-07-18 RX ADMIN — POLYETHYLENE GLYCOL 3350 17 G: 17 POWDER, FOR SOLUTION ORAL at 09:52

## 2025-07-18 RX ADMIN — PENTOXIFYLLINE 400 MG: 400 TABLET, EXTENDED RELEASE ORAL at 10:22

## 2025-07-18 RX ADMIN — IRON SUCROSE 200 MG: 20 INJECTION, SOLUTION INTRAVENOUS at 09:52

## 2025-07-18 RX ADMIN — SODIUM CHLORIDE, PRESERVATIVE FREE 10 ML: 5 INJECTION INTRAVENOUS at 09:54

## 2025-07-18 NOTE — PROGRESS NOTES
CLINICAL PHARMACY NOTE: MEDS TO BEDS    Only discharge med is ferosul which patient has requested to be transferred to [facility] in [place], [place]

## 2025-07-18 NOTE — FLOWSHEET NOTE
07/18/25 0132   Stool Assessment   Incontinence No   Stool Appearance Hard;Formed   Stool Color Brown;Red streaks;Other (Comment)  (1 clots; bright red.)   Stool Amount Small   Stool Source Rectum   Last BM (including prior to admit) 07/18/25   Unmeasured Output   Urine Occurrence 1   Stool Occurrence 1     Patient went to bathroom, small stool with one red clots approx 0.6 cm and red streaks noted.    Electronically signed by Juan C Son RN on 7/18/2025 at 1:34 AM

## 2025-07-18 NOTE — PROGRESS NOTES
INPATIENT PROGRESS NOTE        IDENTIFYING DATA/REASON FOR CONSULTATION   PATIENT:  Dick Gonzalez  MRN:  [id number]  ADMIT DATE: 2025  TIME OF EVALUATION: 2025 9:29 AM  HOSPITAL STAY:   LOS: 2 days   CONSULTING PHYSICIAN: Yari Singh MD   REASON FOR CONSULTATION:    Subjective:    Patient seen in follow up   S/p colonoscopy   No further episodes of GI bleed     MEDICATIONS   SCHEDULED:  iron sucrose, 200 mg, Daily  sodium chloride flush, 5-40 mL, 2 times per day  atorvastatin, 40 mg, Daily  pentoxifylline, 400 mg, TID WC      FLUIDS/DRIPS:     sodium chloride      sodium chloride       PRNs: sodium chloride, , PRN  sodium chloride flush, 5-40 mL, PRN  sodium chloride, , PRN  potassium chloride, 40 mEq, PRN   Or  potassium alternative oral replacement, 40 mEq, PRN   Or  potassium chloride, 10 mEq, PRN  magnesium sulfate, 2,000 mg, PRN  ondansetron, 4 mg, Q8H PRN   Or  ondansetron, 4 mg, Q6H PRN  polyethylene glycol, 17 g, Daily PRN  acetaminophen, 650 mg, Q6H PRN   Or  acetaminophen, 650 mg, Q6H PRN      ALLERGIES:  No Known Allergies      PHYSICAL EXAM   VITALS:  BP (!) 142/75   Pulse (!) 103   Temp 98.3 °F (36.8 °C) (Oral)   Resp 16   Ht 1.664 m (5' 5.5\")   Wt 76.5 kg (168 lb 10.4 oz)   SpO2 98%   BMI 27.64 kg/m²   TEMPERATURE:  Current - Temp: 98.3 °F (36.8 °C); Max - Temp  Av °F (36.7 °C)  Min: 96.8 °F (36 °C)  Max: 98.8 °F (37.1 °C)    Physical Exam:  General appearance: alert, cooperative, no distress, appears stated age  Eyes: Anicteric  Head: Normocephalic, without obvious abnormality  Lungs: clear to auscultation bilaterally, Normal Effort  Heart: regular rate and rhythm, normal S1 and S2, no murmurs or rubs  Abdomen: soft, non-distended, non-tender. Bowel sounds normal.   Extremities: atraumatic, no cyanosis or edema  Skin: warm and dry, no jaundice  Neuro: Grossly intact, A&OX3    LABS AND IMAGING   Laboratory   Recent Labs     25  1547 25  2346 25  0847    WBC 4.0 3.9* 4.3   HGB 9.0* 8.2* 9.0*   HCT 26.4* 23.5* 26.8*   MCV 91.4 89.6 91.2    195 222     Recent Labs     07/15/25  0957 07/16/25  1354 07/17/25  0633    140 140   K 4.6 4.0 3.8    109 109   CO2 24 22 22   BUN 14 19 13   CREATININE 1.0 0.9 0.8     Recent Labs     07/15/25  0957 07/16/25  1354 07/17/25  0633   AST 31 30 26   ALT 29 26 22   BILIDIR  --  0.2  --    BILITOT 0.3 0.4 0.6   ALKPHOS 60 55 51     Recent Labs     07/16/25  1354   LIPASE 51.0     Recent Labs     07/16/25  1354   PROTIME 14.1   INR 1.06         Imaging  CTA ABDOMEN PELVIS W CONTRAST   Final Result   1. No evidence of acute/active GI hemorrhage.   2. Diverticulosis.   3. No acute abdominal or pelvic finding.             Endoscopy      ASSESSMENT AND RECOMMENDATIONS   Dick Gonzalez is a 80-year-old male with a past medical history of atrial fibrillation on Xarelto, peripheral vascular disease, and constipation who presents for with rectal bleeding. Patient presented with increasing fatigue and lightheadedness. Hemoglobin was 6.6. He reports daily bleeding of BRB per rectum for several months. No abdominal pain or weight loss. No change in bowel habits.     GI bleed  S/p colonoscopy 7/17 mild sigmoid diverticulosis and radiation proctitis with active bleeding. Cauterized/clipped .    Post hemorrhagic Anemia   No further episodes GI bleed   Hemoglobin stable at 9.0     RECOMMENDATIONS:     recommendations when to resume anticoagulation   No further GI recommendations     If you have any questions or need any further information, please feel free to contact us [phone].  Thank you for allowing us to participate in the care of Dick Gonzalez.    The note was completed using Dragon voice recognition transcription. Every effort was made to ensure accuracy; however, inadvertent transcription errors may be present despite my best efforts to edit errors.    Rika Tsai PA-C 7/18/2025 at 9:29 AM    Attending physician

## 2025-07-18 NOTE — CARE COORDINATION
Case Management Discharge Note          Date / Time of Note: 7/18/2025 10:51 AM                  Patient Name: Dick Gonzalez   YOB: 1945  Diagnosis: Rectal bleeding [K62.5]  GI bleed [K92.2]  Anemia, unspecified type [D64.9]   Date / Time: 7/16/2025  1:20 PM    Financial:  Payor: [facility] MEDICARE / Plan: [facility] MEDICARE COMPLETE / Product Type: *No Product type* /      Pharmacy:    [id number] - [place] - [address] [place] [address] - P [phone] - F [phone]  [address] [place] [address]  Phone: [phone] Fax: [phone]    [facility] - [place] - [address] - P [phone] - F [phone]  [address]  [place] [address]  Phone: [phone] Fax: [phone]      Assistance purchasing medications?: Potential Assistance Purchasing Medications: No  Assistance provided by Case Management: None at this time    DISCHARGE Disposition: Home- No Services Needed    Transportation:  Transportation PLAN for discharge: family   Mode of Transport: Private Car  Time of Transport: TBD    IMM Completed:   Yes, Case management has presented and reviewed IMM letter #2.       IMM Letter date given:: 07/18/25  IMM Letter time given:: 1101.   Patient and/or family/POA verbalized understanding of their medicare rights and appeal process if needed. Patient and/or family/POA has signed, initialed and placed the date and time on IMM letter #2 on the the appropriate lines. Copy of letter offered and they are aware that the original copy of IMM letter #2 is available prior to discharge from the paper chart on the unit.  Electronic documentation has been entered into epic for IMM letter #2 and original paper copy has been added to the paper chart at the nurses station.     Additional CM Notes:   DC order noted.  Family will transport.  No dc needs identified.    The Plan for Transition of Care is related to the following treatment goals of Rectal bleeding

## 2025-07-18 NOTE — PROGRESS NOTES
IV and telemetry monitor removed. Discharge paperwork completed and discussed with the patient and his spouse. All questions answered. Patient has been made aware of follow up appointments that have been made and need to be made and patient verbalized understanding. Patient has a new prescription. All belongings have been packed up and sent with the patient. Patient will be driven home by his wife.

## 2025-07-18 NOTE — PLAN OF CARE
Problem: Pain  Goal: Verbalizes/displays adequate comfort level or baseline comfort level  7/17/2025 2351 by Juan C Son RN  Outcome: Progressing  Flowsheets (Taken 7/17/2025 2351)  Verbalizes/displays adequate comfort level or baseline comfort level:   Encourage patient to monitor pain and request assistance   Assess pain using appropriate pain scale     Problem: Metabolic/Fluid and Electrolytes - Adult  Goal: Electrolytes maintained within normal limits  Outcome: Progressing  Flowsheets (Taken 7/17/2025 2351)  Electrolytes maintained within normal limits: Monitor labs and assess patient for signs and symptoms of electrolyte imbalances     Problem: Hematologic - Adult  Goal: Maintains hematologic stability  Outcome: Progressing  Flowsheets (Taken 7/17/2025 2351)  Maintains hematologic stability:   Assess for signs and symptoms of bleeding or hemorrhage   Monitor labs for bleeding or clotting disorders

## 2025-07-19 LAB
EKG DIAGNOSIS: NORMAL
EKG Q-T INTERVAL: 368 MS
EKG QRS DURATION: 76 MS
EKG QTC CALCULATION (BAZETT): 427 MS
EKG R AXIS: 38 DEGREES
EKG T AXIS: 43 DEGREES
EKG VENTRICULAR RATE: 81 BPM

## 2025-07-19 PROCEDURE — 93010 ELECTROCARDIOGRAM REPORT: CPT | Performed by: INTERNAL MEDICINE

## 2025-07-21 ENCOUNTER — TELEPHONE (OUTPATIENT)
Dept: CARDIOLOGY CLINIC | Age: 80
End: 2025-07-21

## 2025-07-28 LAB — PSA SERPL DL<=0.01 NG/ML-MCNC: 1.17 NG/ML (ref 0–4)

## 2025-07-30 ENCOUNTER — OFFICE VISIT (OUTPATIENT)
Dept: CARDIOLOGY CLINIC | Age: 80
End: 2025-07-30
Payer: MEDICARE

## 2025-07-30 VITALS
HEIGHT: 66 IN | HEART RATE: 94 BPM | WEIGHT: 168 LBS | OXYGEN SATURATION: 96 % | RESPIRATION RATE: 17 BRPM | BODY MASS INDEX: 27 KG/M2 | DIASTOLIC BLOOD PRESSURE: 76 MMHG | SYSTOLIC BLOOD PRESSURE: 130 MMHG

## 2025-07-30 DIAGNOSIS — I48.19 PERSISTENT ATRIAL FIBRILLATION (HCC): Primary | ICD-10-CM

## 2025-07-30 DIAGNOSIS — E78.5 HYPERLIPIDEMIA LDL GOAL <130: ICD-10-CM

## 2025-07-30 DIAGNOSIS — I87.2 VENOUS INSUFFICIENCY: ICD-10-CM

## 2025-07-30 PROCEDURE — 93000 ELECTROCARDIOGRAM COMPLETE: CPT | Performed by: INTERNAL MEDICINE

## 2025-07-30 PROCEDURE — G8427 DOCREV CUR MEDS BY ELIG CLIN: HCPCS | Performed by: INTERNAL MEDICINE

## 2025-07-30 PROCEDURE — 1123F ACP DISCUSS/DSCN MKR DOCD: CPT | Performed by: INTERNAL MEDICINE

## 2025-07-30 PROCEDURE — 99214 OFFICE O/P EST MOD 30 MIN: CPT | Performed by: INTERNAL MEDICINE

## 2025-07-30 PROCEDURE — 1159F MED LIST DOCD IN RCRD: CPT | Performed by: INTERNAL MEDICINE

## 2025-07-30 PROCEDURE — G8417 CALC BMI ABV UP PARAM F/U: HCPCS | Performed by: INTERNAL MEDICINE

## 2025-07-30 PROCEDURE — 1036F TOBACCO NON-USER: CPT | Performed by: INTERNAL MEDICINE

## 2025-07-30 PROCEDURE — 1111F DSCHRG MED/CURRENT MED MERGE: CPT | Performed by: INTERNAL MEDICINE

## 2025-07-30 NOTE — PROGRESS NOTES
[facility]    Dick Gonzalez  1945 July 30, 2025    CC: \" I had some bleeding\"    HPI:  The patient is 80 y.o. male with a past medical history significant for atrial fibrillation, hyperlipidemia and hypothyroidism. He was referred to Dr. Hernandez for chronic venous insufficiency s/p Varithena procedure of the left GSV.   He is retired now and states not exercising like he used to since August.  States gets up in am takes a hot shower and getting back into exercising 20 minutes a day.  States his leg is doing well since the procedure with Dr Hernandez.     Dick was admitted to [facility] 7/16-7/18/2025 for rectal bleeding. He presented with increased fatigue and lightheadedness. His hemoglobin was 6.6.    Today he presents for follow up and overall he is feeling well.  He was diagnosed with prostate cancer had radiation. He then started passing large blood clots.  He then started bleeding more and was see in the ED received two units of blood and had colonoscopy and found opening in colon.  He received clips and cauterized the area.  He reports he restarted the Xarelto on Sunday and will notice small amount of blood in the mornings. He reports medication compliance and is tolerating. He denies any abnormal bleeding or bruising. He denies exertional chest pain/pressure, dyspnea at rest, worsening DE SOUZA, PND, orthopnea, palpitations, lightheadedness, weight changes, changes in LE edema, and syncope.       Review of Systems:  Constitutional: No fatigue, weakness, night sweats or fever.   HEENT: No new vision difficulties or ringing in the ears.  Respiratory: No new SOB, PND, orthopnea or cough.   Cardiovascular: See HPI   GI: No n/v, diarrhea, constipation, abdominal pain or changes in bowel habits.  No melena, no hematochezia  : No urinary frequency, urgency, incontinence, hematuria or dysuria.  Skin: No cyanosis or skin lesions.  Musculoskeletal: No new muscle or joint

## 2025-08-18 RX ORDER — FERROUS SULFATE 325(65) MG
1 TABLET ORAL 2 TIMES DAILY
Qty: 60 TABLET | Refills: 0 | Status: SHIPPED | OUTPATIENT
Start: 2025-08-18

## 2025-08-25 RX ORDER — ATORVASTATIN CALCIUM 40 MG/1
40 TABLET, FILM COATED ORAL DAILY
Qty: 90 TABLET | Refills: 0 | Status: SHIPPED | OUTPATIENT
Start: 2025-08-25

## (undated) DEVICE — ENDOSCOPY KIT: Brand: MEDLINE INDUSTRIES, INC.

## (undated) DEVICE — BIPOLAR ELECTROHEMOSTASIS CATHETER: Brand: INJECTION GOLD PROBE